# Patient Record
Sex: FEMALE | Race: WHITE | NOT HISPANIC OR LATINO | Employment: OTHER | URBAN - METROPOLITAN AREA
[De-identification: names, ages, dates, MRNs, and addresses within clinical notes are randomized per-mention and may not be internally consistent; named-entity substitution may affect disease eponyms.]

---

## 2017-12-18 ENCOUNTER — HISTORICAL (OUTPATIENT)
Dept: ADMINISTRATIVE | Facility: HOSPITAL | Age: 57
End: 2017-12-18

## 2018-02-12 ENCOUNTER — HISTORICAL (OUTPATIENT)
Dept: ADMINISTRATIVE | Facility: HOSPITAL | Age: 58
End: 2018-02-12

## 2018-03-09 ENCOUNTER — HISTORICAL (OUTPATIENT)
Dept: ADMINISTRATIVE | Facility: HOSPITAL | Age: 58
End: 2018-03-09

## 2018-03-26 ENCOUNTER — HISTORICAL (OUTPATIENT)
Dept: ADMINISTRATIVE | Facility: HOSPITAL | Age: 58
End: 2018-03-26

## 2018-03-30 ENCOUNTER — HISTORICAL (OUTPATIENT)
Dept: ADMINISTRATIVE | Facility: HOSPITAL | Age: 58
End: 2018-03-30

## 2018-06-15 ENCOUNTER — HISTORICAL (OUTPATIENT)
Dept: ADMINISTRATIVE | Facility: HOSPITAL | Age: 58
End: 2018-06-15

## 2018-09-29 ENCOUNTER — HISTORICAL (OUTPATIENT)
Dept: ADMINISTRATIVE | Facility: HOSPITAL | Age: 58
End: 2018-09-29

## 2018-10-28 ENCOUNTER — HISTORICAL (OUTPATIENT)
Dept: ADMINISTRATIVE | Facility: HOSPITAL | Age: 58
End: 2018-10-28

## 2018-12-31 ENCOUNTER — HISTORICAL (OUTPATIENT)
Dept: ADMINISTRATIVE | Facility: HOSPITAL | Age: 58
End: 2018-12-31

## 2019-05-07 ENCOUNTER — HISTORICAL (OUTPATIENT)
Dept: ADMINISTRATIVE | Facility: HOSPITAL | Age: 59
End: 2019-05-07

## 2019-06-26 ENCOUNTER — HISTORICAL (OUTPATIENT)
Dept: ADMINISTRATIVE | Facility: HOSPITAL | Age: 59
End: 2019-06-26

## 2019-08-28 ENCOUNTER — HISTORICAL (OUTPATIENT)
Dept: ADMINISTRATIVE | Facility: HOSPITAL | Age: 59
End: 2019-08-28

## 2019-08-30 ENCOUNTER — HISTORICAL (OUTPATIENT)
Dept: ADMINISTRATIVE | Facility: HOSPITAL | Age: 59
End: 2019-08-30

## 2019-09-20 ENCOUNTER — HISTORICAL (OUTPATIENT)
Dept: ADMINISTRATIVE | Facility: HOSPITAL | Age: 59
End: 2019-09-20

## 2019-11-07 ENCOUNTER — HISTORICAL (OUTPATIENT)
Dept: ADMINISTRATIVE | Facility: HOSPITAL | Age: 59
End: 2019-11-07

## 2019-11-19 ENCOUNTER — HISTORICAL (OUTPATIENT)
Dept: ADMINISTRATIVE | Facility: HOSPITAL | Age: 59
End: 2019-11-19

## 2019-11-20 ENCOUNTER — HISTORICAL (OUTPATIENT)
Dept: ADMINISTRATIVE | Facility: HOSPITAL | Age: 59
End: 2019-11-20

## 2019-11-21 ENCOUNTER — HISTORICAL (OUTPATIENT)
Dept: ADMINISTRATIVE | Facility: HOSPITAL | Age: 59
End: 2019-11-21

## 2019-12-24 ENCOUNTER — HISTORICAL (OUTPATIENT)
Dept: ADMINISTRATIVE | Facility: HOSPITAL | Age: 59
End: 2019-12-24

## 2020-03-12 ENCOUNTER — HISTORICAL (OUTPATIENT)
Dept: ADMINISTRATIVE | Facility: HOSPITAL | Age: 60
End: 2020-03-12

## 2020-06-23 ENCOUNTER — HISTORICAL (OUTPATIENT)
Dept: ADMINISTRATIVE | Facility: HOSPITAL | Age: 60
End: 2020-06-23

## 2020-09-18 ENCOUNTER — HISTORICAL (OUTPATIENT)
Dept: ADMINISTRATIVE | Facility: HOSPITAL | Age: 60
End: 2020-09-18

## 2020-09-27 ENCOUNTER — HISTORICAL (OUTPATIENT)
Dept: ADMINISTRATIVE | Facility: HOSPITAL | Age: 60
End: 2020-09-27

## 2021-01-14 ENCOUNTER — HISTORICAL (OUTPATIENT)
Dept: ADMINISTRATIVE | Facility: HOSPITAL | Age: 61
End: 2021-01-14

## 2021-02-04 LAB
BILIRUB SERPL-MCNC: NEGATIVE MG/DL
BLOOD URINE, POC: NEGATIVE
CLARITY, POC UA: CLEAR
COLOR, POC UA: YELLOW
GLUCOSE UR QL STRIP: NEGATIVE
HUMAN PAPILLOMAVIRUS (HPV): NORMAL
KETONES UR QL STRIP: NEGATIVE
LEUKOCYTE EST, POC UA: NEGATIVE
NITRITE, POC UA: NEGATIVE
PAP RECOMMENDATION EXT: NORMAL
PAP SMEAR: NORMAL
PH, POC UA: 6.5
PROTEIN, POC: NEGATIVE
SPECIFIC GRAVITY, POC UA: 1.03
UROBILINOGEN, POC UA: NORMAL

## 2021-02-24 ENCOUNTER — HISTORICAL (OUTPATIENT)
Dept: ADMINISTRATIVE | Facility: HOSPITAL | Age: 61
End: 2021-02-24

## 2021-06-16 ENCOUNTER — HISTORICAL (OUTPATIENT)
Dept: ADMINISTRATIVE | Facility: HOSPITAL | Age: 61
End: 2021-06-16

## 2021-06-25 LAB
ALBUMIN SERPL-MCNC: 4.2 G/DL (ref 3.4–5)
ALBUMIN/GLOB SERPL: 1.3 {RATIO}
ALP SERPL-CCNC: 132 U/L (ref 50–144)
ALT SERPL-CCNC: 19 U/L (ref 1–45)
ANION GAP SERPL CALC-SCNC: 7 MMOL/L (ref 7–16)
AST SERPL-CCNC: 26 U/L (ref 14–36)
BASOPHILS # BLD AUTO: 0.03 X10(3)/MCL (ref 0.01–0.08)
BASOPHILS NFR BLD AUTO: 0.4 % (ref 0.1–1.2)
BILIRUB SERPL-MCNC: 0.43 MG/DL (ref 0.1–1)
BUN SERPL-MCNC: 11 MG/DL (ref 7–20)
CALCIUM SERPL-MCNC: 9.9 MG/DL (ref 8.4–10.2)
CHLORIDE SERPL-SCNC: 110 MMOL/L (ref 94–110)
CHOLEST SERPL-MCNC: 204 MG/DL (ref 0–200)
CO2 SERPL-SCNC: 23 MMOL/L (ref 21–32)
CREAT SERPL-MCNC: 0.5 MG/DL (ref 0.52–1.04)
CREAT/UREA NIT SERPL: 22 (ref 12–20)
EOSINOPHIL # BLD AUTO: 0.05 X10(3)/MCL (ref 0.04–0.36)
EOSINOPHIL NFR BLD AUTO: 0.7 % (ref 0.7–7)
ERYTHROCYTE [DISTWIDTH] IN BLOOD BY AUTOMATED COUNT: 19.1 % (ref 11–14.5)
FERRITIN SERPL-MCNC: 7.4 NG/ML (ref 6.2–137)
GLOBULIN SER-MCNC: 3.2 G/DL (ref 2–3.9)
GLUCOSE SERPL-MCNC: 100 MGM./DL (ref 70–115)
HCT VFR BLD AUTO: 35.7 % (ref 36–48)
HDLC SERPL-MCNC: 75 MG/DL (ref 40–60)
HGB BLD-MCNC: 11.4 G/DL (ref 11.8–16)
IMM GRANULOCYTES # BLD AUTO: 0.01 X10E3/UL (ref 0–0.03)
IMM GRANULOCYTES NFR BLD AUTO: 0.1 % (ref 0–0.5)
IRON SATN MFR SERPL: 10.6 %
IRON SERPL-MCNC: 47 MCG/DL (ref 37–170)
IRON SERPL-MCNC: 47 MCG/DL (ref 37–170)
LDLC SERPL CALC-MCNC: 105.7 MG/DL (ref 30–100)
LYMPHOCYTES # BLD AUTO: 2.7 X10(3)/MCL (ref 1.16–3.74)
LYMPHOCYTES NFR BLD AUTO: 36 % (ref 20–55)
MCH RBC QN AUTO: 26 PG (ref 27–34)
MCHC RBC AUTO-ENTMCNC: 31.9 G/DL (ref 31–37)
MCV RBC AUTO: 81.3 FL (ref 79–99)
MONOCYTES # BLD AUTO: 0.54 X10(3)/MCL (ref 0.24–0.36)
MONOCYTES NFR BLD AUTO: 7.2 % (ref 4.7–12.5)
NEUTROPHILS # BLD AUTO: 4.17 X10(3)/MCL (ref 1.56–6.13)
NEUTROPHILS NFR BLD AUTO: 55.6 % (ref 37–73)
PLATELET # BLD AUTO: 271 X10(3)/MCL (ref 140–371)
PMV BLD AUTO: 11 FL (ref 9.4–12.4)
POTASSIUM SERPL-SCNC: 4.5 MMOL/L (ref 3.5–5.1)
PROT SERPL-MCNC: 7.4 G/DL (ref 6.3–8.2)
RBC # BLD AUTO: 4.39 X10(6)/MCL (ref 4–5.1)
SODIUM SERPL-SCNC: 140 MMOL/L (ref 135–145)
TIBC SERPL-MCNC: 444 MCG/DL (ref 250–450)
TRANSFERRIN SERPL-MCNC: 360 MG/DL (ref 188–341)
TRIGL SERPL-MCNC: 98 MG/DL (ref 30–200)
TSH SERPL-ACNC: 2.04 UIU/ML (ref 0.36–3.74)
WBC # SPEC AUTO: 7.5 X10(3)/MCL (ref 4–11.5)

## 2021-08-23 LAB — NONINV COLON CA DNA+OCC BLD SCRN STL QL: NEGATIVE

## 2021-09-30 ENCOUNTER — HISTORICAL (OUTPATIENT)
Dept: ADMINISTRATIVE | Facility: HOSPITAL | Age: 61
End: 2021-09-30

## 2021-10-01 LAB — BCS RECOMMENDATION EXT: NORMAL

## 2021-10-09 ENCOUNTER — HISTORICAL (OUTPATIENT)
Dept: ADMINISTRATIVE | Facility: HOSPITAL | Age: 61
End: 2021-10-09

## 2022-04-11 ENCOUNTER — HISTORICAL (OUTPATIENT)
Dept: ADMINISTRATIVE | Facility: HOSPITAL | Age: 62
End: 2022-04-11
Payer: MEDICARE

## 2022-04-25 VITALS
OXYGEN SATURATION: 96 % | BODY MASS INDEX: 51.91 KG/M2 | SYSTOLIC BLOOD PRESSURE: 120 MMHG | DIASTOLIC BLOOD PRESSURE: 86 MMHG | WEIGHT: 293 LBS | HEIGHT: 63 IN

## 2022-05-05 NOTE — HISTORICAL [facility] CERNER
This is a historical note converted from Arslan. Formatting and pictures may have been removed.  Please reference Arslan for original formatting and attached multimedia. Chief Complaint  Bilat knee pain. And Ankle pain  History of Present Illness  59?yo?female?non-smoker?presents to ortho clinic for?routine follow up?for?bilateral?knee pain.? Patient points to ?medial knee. L>R  Today:?Reevaluation of conservative treatments for?bilateral?end-stage knee OA, BMI?52%, CSI given?3/25/2019?patient reports with?some relief only lasting about?1 to 2 weeks, patient?requesting another injection regardless?as it is the only relief she is getting at this time.  Onset: ?Insidious over years?? ?progressively worsening  Current pain level: 10/10??medication helping. Quality described as?throbbing?.? Patient?acknowledges?use of pain medication today.?  Medications r/t complaint: Patient reports using?RX / OTC?medications in past including:?naproxen, Tramadol, acetaminophen-oxycodone RX per PCP. ?Currently taking?naproxen, Tramadol?PRN?pain with?moderate?relief.?  Modifying Factors: ?Worse with/after activity;Improved with rest;??Stiffness after immobilization??Stiffness improved with less than 30 minutes of activity  Injury:?Denies  Previous treatment: HEP?RX per ortho 12/2017 ?; RX NSAIDs, PT?RX per ortho 2017 but unable to attend due to to PT telling her she would require hospitalization to accomplish PT plan and she reports she can not do in patient due to children/ requiring her at home to their care; CSI x1 with some relief only lasting 1-2 months; VS 12/2017 with no relief does not want to repeat? due to inadequate relief.  Associated symptoms: Grinding; ?No numbness or tingling;??Swelling noted to knees unrelated to activity;?No skin changes;?Weakness of bilateral knees with falls;?Moderate decrease in ROM;?difficulty sleeping at night s/t pain  Activity:?Sedentary, full ADLs;?Pain interferes with function/daily  activity (mild)?Patient?acknowledges?use of assistive devices,?walker, ?for assistance with ambulation.  PMH:? denies CVD; denies DM ; denies?RA; denies?gout;??RX anticoagulants- Eliquis ; denies intolerance to NSAIDs s/t GI issues; denies ?intolerance to NSAIDs s/t kidney disease; DX PVD  Family History:?Family history of arthritis  PCP:?Dr. Shaikh SANYA [facility]ano [facility]?[place], [place]?, referral source same  Employment:? Patient worked in [employer], currently unemployed and seeking disability.  Note:? none  Review of Systems  Constitutional:No fever;No chills;No weight loss  CV:No swelling;No edema  GI:No urinary retention;No urinary incontinence  :No fecal incontinence  Skin:No rash;No wound  Neuro:No numbness/tingling;No weakness;No saddle anesthesia  MSK: As above  Psych:No depression;No anxiety  Heme/Lymph:No easy bruising;No easy bleeding;No lymphadenopathy  Immuno:No MRSA history  Physical Exam  Vitals & Measurements  HT:?162.56?cm? WT:?138.34?kg? BMI:?52.35?  MSK: BilateralKNEE  General: No apparent distress;?morbid obesity  Inspection:?limping;??partial weight bearing- patient in clinic with walker;??varus deformity;??no swelling;?no erythema;?No contusion;?atrophy / deconditioning noted moderate quad  Palpation:?tenderness noted at medical and lateral joint lines and patellar tendon; ?Crepitus:?Positive;?Normal temperature  ROM:?  ?????Active Extension/Flexion (0-140):? bilateral  Strength:?  ?????Flexion??4/5  ?????Extension??4/5  Special Tests:  ?????Ballotable Effusion: ?Positive  ?????Fluid Wave:?Negative  ?????Anterior Drawer:Negative?  ?????Lachman:?Negative?  Neurovascular:?Intact; 2+?distal pulse, sensation intact to light touch  Neuro/Psych: Awake, Alert, Oriented; Normal mood and affect  Lymphatic: No LAD  Skin and Soft Tissue: No bruising, No ecchymosis; No rash; Skin intact  Assessment/Plan  1.?Osteoarthritis of knees, bilateral?M17.0  ?1.?  Discussed with patient diagnosis and treatment recommendations.? Handout given.  2.? Imaging:?Radiological studies ordered,?reviewed,?and independently interpreted by me; discussed with patient.  3.? Treatment plan: Conservative treatment to include oral glucosamine 1500 mg/day; Topical capsaicin as needed; Hot or cold therapy; Adequate vitamin C/D intake  4.? Procedure:?Discussed CSI vs VS injections as treatment options; since conservative measures did not improve symptoms patient consented for CSI today  5.? Activity:?Activity as tolerated; HEP to included aerobic conditioning with non-painful activity and ROM/STG exercises;?recommend exercise bike with RPM set at 80 or higher build to 40 minutes 3xs per week as tolerated; ?proper footwear; assistive device to avoid limping?  6.? Therapy:?none  7.? Medication:?First line treatment with daily ?acetaminophen 1000 mg three times daily; Medication precautions given; continue medications as RX per PCP; RX topical diclofenac PRN symptom relief; medication precautions given  8.? RTC:?4 months  9.? Additional work-up:??None  Ordered:  Lidocaine inj., 5 mL, form: Injection, Intra-Articular, Once, first dose 11/07/19 10:27:00 CST, stop date 11/07/19 10:27:00 CST  Lidocaine inj., 5 mL, form: Injection, Intra-Articular, Once, first dose 11/07/19 10:28:00 CST, stop date 11/07/19 10:28:00 CST  triamcinolone, 40 mg, form: Injection, Intra-Articular, Once, first dose 11/07/19 10:28:00 CST, stop date 11/07/19 10:28:00 CST  triamcinolone, 40 mg, form: Injection, Intra-Articular, Once, first dose 11/07/19 10:27:00 CST, stop date 11/07/19 10:27:00 CST  Clinic Follow up, *Est. 03/07/20 3:00:00 CST, Order for future visit, Osteoarthritis of knees, bilateral, [facility]  Office/Outpatient Visit Level 4 Established [id number] PC, Osteoarthritis of knees, bilateral, [facility] 25, 11/07/19 10:27:00 CST  ?  2.?Morbid obesity?E66.01  ?Patient educated that diet modifications with low  impact exercises as tolerated for reduction in BMI would improve overall treatment and outcome.  ?  Orders:  Asp/Inj (Bilateral) Major Jnt/Bursa [id number], 11/07/19 10:27:00 CST, [facility], Routine, 11/07/19 10:27:00 CST  XR Knee Left 4 or More Views, Routine, 11/07/19 9:58:00 CST, Pain, None, Ambulatory, Rad Type, Knee pain, bilateral, Not Scheduled, 11/07/19 9:58:00 CST  XR Knee Right 4 or More Views, Routine, 11/07/19 9:59:00 CST, Pain, None, Ambulatory, Rad Type, Knee pain, bilateral, Not Scheduled, 11/07/19 9:59:00 CST  Referrals  Clinic Follow up, *Est. 03/09/20 10:35:00 CDT, Order for future visit, Osteoarthritis of knees, bilateral, [facility]   Problem List/Past Medical History  Ongoing  Morbid obesity  Osteoarthritis of left knee  Primary osteoarthritis of both knees  Historical  No qualifying data  Procedure/Surgical History  abdominal/intestinal sx (2017)  abdominal/intestinal sx (2016)  abdominal/intestinal sx (2008)  hernia repair (2007)  intestinal sx (2007)  colon/hemorrhoid sx (1990)  BTL (1981)  T&A (1971)   Medications  acetaminophen-oxycodone 325 mg-5 mg oral tablet, Oral, q6hr,? ?Not Taking, Completed Rx  ALPRAZOLAM TAB 1MG, Oral  Augmentin 875 mg-125 mg oral tablet, 1 tab(s), Oral, q12hr,? ?Not taking  buPROPion 300 mg/24 hours (XL) oral tablet, extended release, 300 mg= 1 tab(s), Oral, qAM  carvedilol 6.25 mg oral tablet, 6.25 mg= 1 tab(s), Oral, BID  Cyclobenzaprine 10 Mg Tablet, 10 mg= 1 tab(s), Oral, q8hr  diclofenac 1% topical gel, 2 gm, TOP, QID, PRN, 5 refills  Eliquis Starter Pack for Treatment of DVT and PE 5 mg oral tablet, 5 mg= 1 tab(s), Oral, BID  enalapril 10 mg oral tablet, 10 mg= 1 tab(s), Oral, Daily  enalapril 20 mg oral tablet, 20 mg= 1 tab(s), Oral, Daily  ferrous sulfate 325 mg oral tablet, 325 mg= 1 tab(s), Oral, Daily,? ?Not taking  furosemide 20 mg oral tablet, 20 mg= 1 tab(s), Oral, Daily  loratadine 10 mg oral tablet, 10 mg= 1 tab(s), Oral, Daily,? ?Not  taking  Naproxen 500 Mg Tablet, 500 mg= 1 tab(s), Oral, BID  ondansetron 4 mg oral tablet, Oral, q6hr  Pantoprazole 40 mg ORAL EC-Tablet, 40 mg= 1 tab(s), Oral, Daily,? ?Not Taking, Completed Rx  Poly Iron Forte oral capsule, Oral, Daily  potassium chloride 10 mEq oral CAPSULE extended release, 10 mEq= 1 cap(s), Oral, Daily,? ?Not Taking, Completed Rx  Seroquel 25 mg oral tablet, 25 mg= 1 tab(s), Oral, At Bedtime,? ?Not taking  Tramadol Hcl 50 Mg Tablet, 50 mg, Oral, q6hr,? ?Not Taking, Completed Rx  Allergies  Keflex  erythromycin  Social History  Alcohol  Past, 09/22/2016  Substance Use  Never, 09/22/2016  Tobacco  Never (less than 100 in lifetime), N/A, 11/07/2019  Health Maintenance  Health Maintenance  ???Pending?(in the next year)  ??? ??OverDue  ??? ? ? ?Diabetes Screening due??and every?  ??? ? ? ?Alcohol Misuse Screening due??01/01/19??and every 1??year(s)  ??? ??Due?  ??? ? ? ?Aspirin Therapy for CVD Prevention due??11/07/19??and every 1??year(s)  ??? ? ? ?Breast Cancer Screening due??11/07/19??and every?  ??? ? ? ?Cervical Cancer Screening due??11/07/19??and every?  ??? ? ? ?Colorectal Screening due??11/07/19??and every 1??year(s)  ??? ? ? ?Influenza Vaccine due??11/07/19??and every?  ??? ? ? ?Tetanus Vaccine due??11/07/19??and every 10??year(s)  ??? ? ? ?Zoster Vaccine due??11/07/19??and every 100??year(s)  ??? ??Due In Future?  ??? ? ? ?Obesity Screening not due until??01/01/20??and every 1??year(s)  ??? ? ? ?Hypertension Management-BMP not due until??01/14/20??and every 1??year(s)  ??? ? ? ?ADL Screening not due until??03/25/20??and every 1??year(s)  ??? ? ? ?Blood Pressure Screening not due until??05/06/20??and every 1??year(s)  ??? ? ? ?Hypertension Management-Blood Pressure not due until??05/06/20??and every 1??year(s)  ??? ? ? ?Body Mass Index Check not due until??11/06/20??and every 1??year(s)  ???Satisfied?(in the past 1 year)  ??? ??Satisfied?  ??? ? ? ?ADL Screening on??03/25/19.??Satisfied by  Natalio Benton  ??? ? ? ?Body Mass Index Check on??11/07/19.??Satisfied by Jossy Carson LPN  ??? ? ? ?Obesity Screening on??11/07/19.??Satisfied by Jossy Carson LPN  ?  Diagnostic Results  XR bilateral knees obtained 11/7/19; DJD Noted; awaiting radiologist findings.

## 2022-05-05 NOTE — HISTORICAL [facility] CERNER
This is a historical note converted from Arslan. Formatting and pictures may have been removed.  Please reference Arslan for original formatting and attached multimedia. Procedure Name  Date/Time:11/7/2019 10:56:22  Procedure:??Bilateral?Knee Injection  Indications:??Diagnostic and Therapeutic Indication - decrease pain, increase range of motion and improve quality of life  RISKS: Possible complications with injection include?bleeding, infection (0.01%), tendon rupture, steroid flare, fat pad or soft tissue atrophy, skin depigmentation, and vasovagal response. ?(Steroid flair treatment is rest, ice, NSAIDs and resolves in 24-36 hours.)  Consent:???Procedure, risks, benefits, & alternatives explained to patient, who voiced understanding and agreed to proceed with procedure. ?Consent signed and?scanned into the medical record. No absolute contraindications (cellulitis overlying joint, infection, lack of informed consent, allergy to injection mediation, kalyani protein or egg allergy for sodium hyaluronate, or history of steroid flare) or relative contraindications (brittle or out of control DM HgA1c > 10, coagulopathy INR > 3.5, previous joint replacement, or history of AVN).  Description:?Time out performed. The patient was prepped?using Chlorhexidine scrub after the appropriate?identification of anatomic landmarks.? Sterile needle used (size # 21 gauge, length 1.5 inch)?Topical anesthetic of ethyl chloride was used.? ?5 mL of 1% lidocaine plain with 40 mg of Kenalog injected.  Complications:?None?  EBL:??None  Post Procedure:?Patient reports improvement in pain and ROM. Patient alert, moving all extremities. ?Good peripheral pulses, no signs of vascular compromise, range of motion intact. ?Patient tolerated procedure well. ?Aftercare instructions were given to patient at time of discharge.??Relative rest for 3 days - avoiding excessive activity. ?Pendulum stretching exercises followed by stretching exercises  daily?starting on day 4.? Place ice on area for 15 minutes every 4 to 6 hours.??Tylenol 1000mg twice a day or ibuprofen 600 mg three times per day for next 3-4 days if not on medication already. ?Protect the area for the next 1-8 hours if anesthetic was used. ?Avoid excessive activity for next 3 to 4 weeks.?ER precautions for fever, severe joint pain, or allergic reaction or other new symptoms related to joint injection.

## 2022-05-05 NOTE — HISTORICAL [facility] CERNER
This is a historical note converted from Arslan. Formatting and pictures may have been removed.  Please reference Arslan for original formatting and attached multimedia. Chief Complaint  F/U bilat. knees  History of Present Illness  62 yo?female?non-smoker?presents to ortho clinic for?routine follow up?for?bilateral?knee pain.? Patient points to ?medial knee. L>R  Today:?Reevaluation of conservative treatments for?bilateral?end-stage knee OA, BMI?52, CSI given?9/4/2020?patient reports with?some relief only lasting about?1 to 2 weeks.?? Tearful in clinic scared of ending up in a wheelchair  Onset: ?Insidious over years?? ?progressively worsening  Current pain level: 10/10??medication helping. Quality described as?throbbing?.? Patient?acknowledges?use of pain medication today.?  Medications r/t complaint: Patient reports using?RX / OTC?medications in past including:?naproxen, Tramadol, acetaminophen-oxycodone, topical diclofenac?RX per PCP. ?Currently taking?naproxen, Tramadol, topical diclofenac ?PRN?pain with?moderate?relief.?Patient reports using topical diclofenac at night and helps her sleep better.  Modifying Factors: ?Worse with/after activity;Improved with rest;??Stiffness after immobilization??Stiffness improved with less than 30 minutes of activity  Injury:?Denies  Previous treatment: HEP?RX per ortho 12/2017 ?; RX NSAIDs, PT?RX per ortho 2017 but unable to attend due to to PT telling her she would require hospitalization to accomplish PT plan and she reports she can not do in patient due to children/ requiring her at home to their care; CSI?last injection 9/4/20?with some relief only lasting 1-2 months; VS 12/2017 with no relief does not want to repeat? due to inadequate relief.  Associated symptoms: Grinding; ?No numbness or tingling;??Swelling noted to knees unrelated to activity;?No skin changes;?Weakness of bilateral knees with falls;?Moderate decrease in ROM;?difficulty sleeping at night s/t  pain  Activity:?Sedentary, full ADLs;?Pain interferes with function/daily activity (mild)?Patient?acknowledges?use of assistive devices,?walker, ?for assistance with ambulation.  PMH:? denies CVD; denies DM ; denies?RA; denies?gout;??RX anticoagulants- Eliquis ; denies intolerance to NSAIDs s/t GI issues; denies ?intolerance to NSAIDs s/t kidney disease; DX PVD  Family History:?Family history of arthritis  PCP:?Dr. Shaikh SANYA Galeano [facility]?[place], [place]?, referral source same  Employment:? Patient worked in [employer] and [employer] g[employer], currently unemployed and seeking disability.  Note:? seen in [facility] ortho since 9/22/16  Review of Systems  Constitutional:No fever;No chills;No weight loss  CV:No swelling;No edema  GI:No urinary retention;No urinary incontinence  :No fecal incontinence  Skin:No rash;No wound  Neuro:No numbness/tingling;No weakness;No saddle anesthesia  MSK: As above  Psych:No depression;No anxiety  Heme/Lymph:No easy bruising;No easy bleeding;No lymphadenopathy  Immuno:No MRSA history  Physical Exam  Vitals & Measurements  HT:?162.56?cm? WT:?139.300?kg? BMI:?52.71?  MSK: ?Bilateral??KNEE  General: No apparent distress and?tearful in clinic today;?morbid obesity  Inspection:?limping;??full weight bearing;??no swelling;?no erythema;?No contusion;?atrophy / deconditioning noted- mild quad;?varus deformity?;??No prominence of the tibial tubercle  Palpation:?tenderness noted at lateral and medial joint lines;?No tenderness at lateral or medial retinaculum?Crepitus:?Positive;?Normal temperature  ROM:?  ?????Active Extension/Flexion (0-140):?6-101 (L); 5-103 (R)  Strength:?  ?????Flexion??4/5  ?????Extension??4/5  Special Tests:  ?????a) Effusion Testing:  ??????????Ballotable Effusion: ?Negative  ??????????Fluid Wave:?Negative  ?????b) Patellar Testing:  ??????????Patellar Grind: ?Positive? bilateral  ??????????Apprehension:?Negative  ??????????Patella?Facet?Tenderness:?Positive?  bilateral  ?????c) Meniscal Tear Assessment:?  ??????????Erick:?Positive? bilateral ?  ?????d) Ligamentous Testing:  ?????????ACL Anterior Drawer:?Negative  ?????????PCL Posterior Drawer:?Negative  ??? ?????LCL Varus Test:?Negative  ?????????MCL Valgus Test:?Negative?????  Neurovascular:?Intact;? sensation intact to light touch  Neuro/Psych: Awake, Alert, Oriented; Normal mood and affect  Lymphatic: No LAD  Skin and Soft Tissue: No bruising, No ecchymosis; No rash; Skin intact  Assessment/Plan  1.?Osteoarthritis of knees, bilateral?M17.0  ?1.? Discussed with patient diagnosis and treatment recommendations.? Handout given.  2.? Imaging:?Radiological studies ordered,?reviewed,?and independently interpreted by me; discussed with patient. Noted? DJD  3.? Treatment plan: Conservative treatment to include oral glucosamine 1500 mg/day; Topical capsaicin as needed; Hot or cold therapy; Adequate vitamin C/D intake  4.? Procedure:?Discussed CSI vs VS injections as treatment options; since conservative measures did not improve symptoms patient consented for CSI today  5.? Activity:?Activity as tolerated; HEP to included aerobic conditioning with non-painful activity and ROM/STG exercises;?recommend exercise bike with RPM set at 80 or higher build to 40 minutes 3xs per week as tolerated; ?proper footwear; assistive device to avoid limping?  6.? Therapy:?Formal PT/OT ordered  7.? Medication:?First line treatment with daily ?acetaminophen 1000 mg three times daily; Medication precautions given; continue medications as RX per PCP; RX refill for topical diclofenac PRN symptom relief, medication precaution given.  8.? RTC:?3 months?  9.? Note:? Extended discussion about importance of reducing BMI in order to qualify for surgical treatment options.? Patient will attempt to reduce BMI until next appt. in hopes of referral ASAP.  ?Physical therapy has been prescribed to you.? We do not offer physical therapy in this clinic/  Hospitals in [place].??If you have insurance you will need to contact your insurance company so they can provide you with a list of physical therapist that accept your insurance.   Ordered:  asp/inj jnt/bursa, major [id number] PC, 02/24/21 11:49:00 CST, LT, [facility], Routine, 02/24/21 11:49:00 CST, Osteoarthritis of knees, bilateral  asp/inj jnt/bursa, major [id number] PC, 02/24/21 11:49:00 CST, RT, [facility], Routine, 02/24/21 11:49:00 CST, Osteoarthritis of knees, bilateral  Clinic Follow up, *Est. 05/24/21 11:30:00 CDT, Order for future visit, Osteoarthritis of knees, bilateral, [facility]  Office/Outpatient Visit Level 4 Established [id number] PC, Osteoarthritis of knees, bilateral, [facility] 25, 02/24/21 11:49:00 CST  ?  2.?Morbid obesity?E66.01  ?Patient educated that diet modifications with low impact exercises as tolerated for reduction in BMI would improve overall treatment and outcome.??  ?  Referrals  Clinic Follow up, *Est. 05/24/21 11:30:00 CDT, Order for future visit, Osteoarthritis of knees, bilateral, [facility]   Problem List/Past Medical History  Ongoing  Morbid obesity  Osteoarthritis of left knee  Primary osteoarthritis of both knees  Historical  Atrial fibrillation  Hypertension  Pregnant  Pregnant  Pregnant  Procedure/Surgical History  PAP (08/29/2019)  abdominal/intestinal sx (2017)  abdominal/intestinal sx (2016)  abdominal/intestinal sx (2008)  hernia repair (2007)  intestinal sx (2007)  colon/hemorrhoid sx (1990)  BTL (1981)  T&A (1971)   Medications  carvedilol 6.25 mg oral tablet, 6.25 mg= 1 tab(s), Oral, BID  diclofenac 1% topical gel, 2 gm, TOP, QID, PRN, 2 refills  diclofenac 1% topical gel, 2 gm, TOP, QID, PRN, 5 refills,? ?Not taking  Eliquis Starter Pack for Treatment of DVT and PE 5 mg oral tablet, 5 mg= 1 tab(s), Oral, BID  enalapril 20 mg oral tablet, 20 mg= 1 tab(s), Oral, Daily  furosemide 20 mg oral tablet, 20 mg= 1 tab(s), Oral, Daily  Allergies  Keflex  erythromycin  Social  History  Abuse/Neglect  No, No, Yes, 02/24/2021  Alcohol  Past, 09/22/2016  Substance Use  Never, 09/22/2016  Tobacco  Never (less than 100 in lifetime), N/A, 02/24/2021  Family History  Primary malignant neoplasm of breast: Negative: Mother, Father, Sister and Brother.  Primary malignant neoplasm of colon: Negative: Mother, Father, Sister and Brother.  Primary malignant neoplasm of female genital organ: Negative: Mother, Father, Sister and Brother.  Health Maintenance  Health Maintenance  ???Pending?(in the next year)  ??? ??OverDue  ??? ? ? ?ADL Screening due??03/25/20??and every 1??year(s)  ??? ??Due?  ??? ? ? ?Alcohol Misuse Screening due??01/02/21??and every 1??year(s)  ??? ? ? ?Aspirin Therapy for CVD Prevention due??02/24/21??and every 1??year(s)  ??? ? ? ?Pneumococcal Vaccine due??02/24/21??Unknown Frequency  ??? ? ? ?Tetanus Vaccine due??02/24/21??and every 10??year(s)  ??? ? ? ?Zoster Vaccine due??02/24/21??Unknown Frequency  ??? ??Due In Future?  ??? ? ? ?Influenza Vaccine not due until??10/01/21??and every 1??day(s)  ??? ? ? ?Obesity Screening not due until??01/01/22??and every 1??year(s)  ??? ? ? ?Hypertension Management-BMP not due until??01/14/22??and every 1??year(s)  ??? ? ? ?Blood Pressure Screening not due until??02/04/22??and every 1??year(s)  ??? ? ? ?Hypertension Management-Blood Pressure not due until??02/04/22??and every 1??year(s)  ???Satisfied?(in the past 1 year)  ??? ??Satisfied?  ??? ? ? ?Blood Pressure Screening on??02/04/21.??Satisfied by Cindy Wagner  ??? ? ? ?Body Mass Index Check on??02/24/21.??Satisfied by Jossy Carson LPN  ??? ? ? ?Cervical Cancer Screening on??02/04/21.??Satisfied by Cindy Wagner  ??? ? ? ?Depression Screening on??02/24/21.??Satisfied by Jossy Carson LPN  ??? ? ? ?Hypertension Management-Blood Pressure on??02/04/21.??Satisfied by Cindy Wagner  ??? ? ? ?Influenza Vaccine on??02/04/21.??Satisfied by Cindy Wagner  ??? ? ? ?Obesity Screening  on??02/24/21.??Satisfied by Alli DELGADILLO, Jossy Hernadez  ?  Diagnostic Results  02/24/2021 10:41 CST XR Knee Right 4 or More Views  Radiology Report  XR Knee Right 4 or More Views  HISTORY: Pain  COMPARISON: None.  FINDINGS:  No acute displaced fractures or dislocations.  There is narrowing of the medial compartment of the knee joint with  the presence of marginal osteophytes there is narrowing also of the  patellofemoral joint articular spaces are otherwise preserved with  smooth articular surfaces.  No blastic or lytic lesions.  Soft tissues within normal limits.  IMPRESSION:  Degenerative changes.  02/24/2021 10:41 CST XR Knee Left 4 or More Views  Radiology Report  XR Knee Left 4 or More Views  HISTORY: Pain  COMPARISON: None.  FINDINGS:  There is narrowing of the medial compartment of the knee joint with  almost complete obliteration of the joint space articular spaces are  otherwise preserved with smooth articular surfaces No blastic or lytic  lesions.  Soft tissues within normal limits.  IMPRESSION:  Degenerative changes     [1]?XR Knee Right 4 or More Views; René Julien MD 02/24/2021 10:41 CST

## 2022-05-05 NOTE — HISTORICAL [facility]
This is a historical note converted from [employer]. Formatting and pictures may have been removed.  Please reference [employer]slan for original formatting and attached multimedia. Procedure Name  Date/Time:2/24/2021 12:49:37  Procedure:??Bilateral?Knee Injection? lateral infrapatellar approach?  Indications:??Diagnostic and Therapeutic Indication - decrease pain, increase range of motion and improve quality of life  RISKS: Possible complications with injection include?bleeding, infection (0.01%), tendon rupture, steroid flare, fat pad or soft tissue atrophy, skin depigmentation, and vasovagal response. ?(Steroid flair treatment is rest, ice, NSAIDs and resolves in 24-36 hours.)  Consent:???Procedure, risks, benefits, & alternatives explained to patient, who voiced understanding and agreed to proceed with procedure. ?Consent signed and?scanned into the medical record. No absolute contraindications (cellulitis overlying joint, infection, lack of informed consent, allergy to injection mediation, kalyani protein or egg allergy for sodium hyaluronate, or history of steroid flare) or relative contraindications (brittle or out of control DM HgA1c > 10, coagulopathy INR > 3.5, previous joint replacement, or history of AVN).  Description:?Time out performed. The patient was prepped?using Chlorhexidine scrub after the appropriate?identification of anatomic landmarks.? Sterile needle used (size # 21 gauge, length 1.5 inch)?Topical anesthetic of ethyl chloride was used.? ?5 mL of 1% lidocaine plain with 40 mg of Kenalog injected.  Complications:?None?  EBL:??None  Post Procedure:?Patient reports improvement in pain and ROM. Patient alert, moving all extremities. ?Good peripheral pulses, no signs of vascular compromise, range of motion intact. ?Patient tolerated procedure well. ?Aftercare instructions were given to patient at time of discharge.??Relative rest for 3 days - avoiding excessive activity. ?Pendulum stretching exercises followed  by stretching exercises daily?starting on day 4.? Place ice on area for 15 minutes every 4 to 6 hours.??Tylenol 1000mg twice a day or ibuprofen 600 mg three times per day for next 3-4 days if not on medication already. ?Protect the area for the next 1-8 hours if anesthetic was used. ?Avoid excessive activity for next 3 to 4 weeks.?ER precautions for fever, severe joint pain, or allergic reaction or other new symptoms related to joint injection.

## 2022-05-07 ENCOUNTER — HISTORICAL (OUTPATIENT)
Dept: ADMINISTRATIVE | Facility: HOSPITAL | Age: 62
End: 2022-05-07
Payer: MEDICAID

## 2022-10-03 ENCOUNTER — HISTORICAL (OUTPATIENT)
Dept: ADMINISTRATIVE | Facility: HOSPITAL | Age: 62
End: 2022-10-03

## 2022-12-05 ENCOUNTER — HOSPITAL ENCOUNTER (EMERGENCY)
Facility: HOSPITAL | Age: 62
Discharge: HOME | End: 2022-12-05
Attending: EMERGENCY MEDICINE
Payer: MEDICAID

## 2022-12-05 VITALS
HEIGHT: 65 IN | RESPIRATION RATE: 18 BRPM | BODY MASS INDEX: 48.82 KG/M2 | DIASTOLIC BLOOD PRESSURE: 57 MMHG | SYSTOLIC BLOOD PRESSURE: 107 MMHG | TEMPERATURE: 98 F | OXYGEN SATURATION: 100 % | WEIGHT: 293 LBS | HEART RATE: 80 BPM

## 2022-12-05 DIAGNOSIS — S43.52XA SPRAIN OF LEFT ACROMIOCLAVICULAR LIGAMENT, INITIAL ENCOUNTER: Primary | ICD-10-CM
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
## 2022-12-05 DIAGNOSIS — S62.606A CLOSED DISPLACED FRACTURE OF PHALANX OF RIGHT LITTLE FINGER, UNSPECIFIED PHALANX, INITIAL ENCOUNTER: ICD-10-CM

## 2022-12-05 PROCEDURE — 96372 THER/PROPH/DIAG INJ SC/IM: CPT | Mod: 59 | Performed by: PHYSICIAN ASSISTANT

## 2022-12-05 PROCEDURE — 26770 TREAT FINGER DISLOCATION: CPT | Mod: F4

## 2022-12-05 PROCEDURE — 25000003 PHARM REV CODE 250: Performed by: PHYSICIAN ASSISTANT

## 2022-12-05 PROCEDURE — 96375 TX/PRO/DX INJ NEW DRUG ADDON: CPT

## 2022-12-05 PROCEDURE — 96376 TX/PRO/DX INJ SAME DRUG ADON: CPT

## 2022-12-05 PROCEDURE — 99285 EMERGENCY DEPT VISIT HI MDM: CPT | Mod: 25

## 2022-12-05 PROCEDURE — 90715 TDAP VACCINE 7 YRS/> IM: CPT | Performed by: PHYSICIAN ASSISTANT

## 2022-12-05 PROCEDURE — 26775 TREAT FINGER DISLOCATION: CPT | Mod: F4

## 2022-12-05 PROCEDURE — 90471 IMMUNIZATION ADMIN: CPT | Performed by: PHYSICIAN ASSISTANT

## 2022-12-05 PROCEDURE — 63600175 PHARM REV CODE 636 W HCPCS: Performed by: PHYSICIAN ASSISTANT

## 2022-12-05 PROCEDURE — 96374 THER/PROPH/DIAG INJ IV PUSH: CPT

## 2022-12-05 RX ORDER — LIDOCAINE HYDROCHLORIDE 20 MG/ML
10 INJECTION, SOLUTION INFILTRATION; PERINEURAL ONCE
Status: COMPLETED | OUTPATIENT
Start: 2022-12-05 | End: 2022-12-05

## 2022-12-05 RX ORDER — METHOCARBAMOL 750 MG/1
1500 TABLET, FILM COATED ORAL 3 TIMES DAILY
Qty: 42 TABLET | Refills: 0 | Status: SHIPPED | OUTPATIENT
Start: 2022-12-05 | End: 2022-12-12

## 2022-12-05 RX ORDER — OXYCODONE AND ACETAMINOPHEN 5; 325 MG/1; MG/1
1 TABLET ORAL EVERY 6 HOURS PRN
Qty: 20 TABLET | Refills: 0 | Status: SHIPPED | OUTPATIENT
Start: 2022-12-05 | End: 2022-12-10

## 2022-12-05 RX ORDER — HYDROMORPHONE HYDROCHLORIDE 2 MG/ML
1 INJECTION, SOLUTION INTRAMUSCULAR; INTRAVENOUS; SUBCUTANEOUS
Status: COMPLETED | OUTPATIENT
Start: 2022-12-05 | End: 2022-12-05

## 2022-12-05 RX ORDER — ORPHENADRINE CITRATE 30 MG/ML
60 INJECTION INTRAMUSCULAR; INTRAVENOUS
Status: COMPLETED | OUTPATIENT
Start: 2022-12-05 | End: 2022-12-05

## 2022-12-05 RX ORDER — KETOROLAC TROMETHAMINE 30 MG/ML
15 INJECTION, SOLUTION INTRAMUSCULAR; INTRAVENOUS
Status: COMPLETED | OUTPATIENT
Start: 2022-12-05 | End: 2022-12-05

## 2022-12-05 RX ORDER — ONDANSETRON 2 MG/ML
4 INJECTION INTRAMUSCULAR; INTRAVENOUS
Status: COMPLETED | OUTPATIENT
Start: 2022-12-05 | End: 2022-12-05

## 2022-12-05 RX ADMIN — HYDROMORPHONE HYDROCHLORIDE 1 MG: 2 INJECTION, SOLUTION INTRAMUSCULAR; INTRAVENOUS; SUBCUTANEOUS at 03:12

## 2022-12-05 RX ADMIN — ORPHENADRINE CITRATE 60 MG: 30 INJECTION INTRAMUSCULAR; INTRAVENOUS at 02:12

## 2022-12-05 RX ADMIN — TETANUS TOXOID, REDUCED DIPHTHERIA TOXOID AND ACELLULAR PERTUSSIS VACCINE, ADSORBED 0.5 ML: 5; 2.5; 8; 8; 2.5 SUSPENSION INTRAMUSCULAR at 12:12

## 2022-12-05 RX ADMIN — HYDROMORPHONE HYDROCHLORIDE 1 MG: 2 INJECTION, SOLUTION INTRAMUSCULAR; INTRAVENOUS; SUBCUTANEOUS at 12:12

## 2022-12-05 RX ADMIN — ONDANSETRON 4 MG: 2 INJECTION INTRAMUSCULAR; INTRAVENOUS at 12:12

## 2022-12-05 RX ADMIN — LIDOCAINE HYDROCHLORIDE 10 ML: 20 INJECTION, SOLUTION INFILTRATION; PERINEURAL at 02:12

## 2022-12-05 RX ADMIN — KETOROLAC TROMETHAMINE 15 MG: 30 INJECTION, SOLUTION INTRAMUSCULAR at 02:12

## 2022-12-05 NOTE — FIRST PROVIDER EVALUATION
"Medical screening examination initiated.  I have conducted a focused provider triage encounter, findings are as follows:    Brief history of present illness:  63 yo female presents to ED for evaluation of left shoulder and right hand pain. Patient reports to walking in store with cart when the cart got away causing her to fall. Patient reports unable to move her left shoulder. States hit her head with no LOC. Denies any nausea or vomiting.     Vitals:    12/05/22 1234   BP: 118/75   Pulse: 76   Resp: 20   Temp: 97.6 °F (36.4 °C)   TempSrc: Oral   SpO2: 97%   Weight: 135.2 kg (298 lb)   Height: 5' 5" (1.651 m)       Pertinent physical exam:  Awake alert and oriented lying on stretcher.  Abrasion noted to nasal bridge.  Decreased range of motion in left shoulder due to pain.  Right hand with tenderness and deformity of 5th finger. Radial pulses 2+.     Brief workup plan:  XR, tdap, pain medication.     Preliminary workup initiated; this workup will be continued and followed by the physician or advanced practice provider that is assigned to the patient when roomed.  "

## 2022-12-05 NOTE — ED PROVIDER NOTES
Encounter Date: 12/5/2022       History     Chief Complaint   Patient presents with    Fall     Brought per [facility] for c/o falling leaving the grocery store. Having left shoulder pain and right pinky pain     61 yo female presents to ED for evaluation of left shoulder and right hand pain. Patient reports to walking in store with cart when the cart got away causing her to fall. Patient reports unable to move her left shoulder. States hit her head with no LOC. Denies any nausea or vomiting.     The history is provided by the patient. No  was used.   Review of patient's allergies indicates:   Allergen Reactions    Cephalexin      Other reaction(s): unknown    Erythromycin      Other reaction(s): unknown    Metronidazole      Other reaction(s): Vomiting     No past medical history on file.  No past surgical history on file.  No family history on file.  Social History     Tobacco Use    Smoking status: Never    Smokeless tobacco: Never     Review of Systems   Constitutional:  Negative for chills, fatigue and fever.   Respiratory:  Negative for shortness of breath.    Cardiovascular:  Negative for chest pain.   Gastrointestinal:  Negative for abdominal pain, diarrhea, nausea and vomiting.   Genitourinary:  Negative for dysuria, flank pain, frequency and urgency.   Musculoskeletal:  Positive for arthralgias, joint swelling and myalgias. Negative for back pain.   Skin:  Positive for wound.   Neurological:  Positive for headaches.   All other systems reviewed and are negative.    Physical Exam     Initial Vitals [12/05/22 1234]   BP Pulse Resp Temp SpO2   118/75 76 20 97.6 °F (36.4 °C) 97 %      MAP       --         Physical Exam    Vitals reviewed.  Constitutional: She appears well-developed.   HENT:   Head: Normocephalic and atraumatic.   Right Ear: External ear normal.   Left Ear: External ear normal.   Mouth/Throat: Oropharynx is clear and moist.   Eyes: Conjunctivae are normal. Pupils are equal, round,  and reactive to light.   Neck: Neck supple.   Normal range of motion.  Cardiovascular:  Normal rate, regular rhythm and normal heart sounds.           Pulmonary/Chest: Breath sounds normal. She has no wheezes. She has no rhonchi. She has no rales.   Abdominal: Abdomen is soft. Bowel sounds are normal. There is no abdominal tenderness.   Musculoskeletal:      Left shoulder: Tenderness present. No swelling or deformity. Decreased range of motion.      Right hand: Swelling, deformity and tenderness present. Decreased range of motion.      Cervical back: Normal range of motion and neck supple.      Comments: Radial pulses 2+. All other adjacent joints otherwise normal       Neurological: She is alert and oriented to person, place, and time. She has normal strength. No cranial nerve deficit. GCS score is 15. GCS eye subscore is 4. GCS verbal subscore is 5. GCS motor subscore is 6.   Skin: Skin is warm and dry.   Abrasion noted to bridge of nasal and chin   Psychiatric: She has a normal mood and affect.       ED Course   Orthopedic Injury    Date/Time: 12/5/2022 2:35 PM  Performed by: RAMIN Whitlock  Authorized by: RAMIN Whitlock     Location procedure was performed:  [facility] EMERGENCY DEPARTMENT  Pre-operative diagnosis:  Displaced left finger fracture  Consent Done?:  Yes  Universal Protocol:     Verbal consent obtained?: Yes      Time Out: Immediately prior to the procedure a time out was called    Injury:     Injury location: left fifth finger.      Pre-procedure assessment:     Neurovascular status: Neurovascularly intact      Distal perfusion: normal      Neurological function: normal      Range of motion: reduced      Local anesthesia used?: Yes      Anesthesia:  Nerve block    Local anesthetic:  Lidocaine 2% without epinephrine    Anesthetic total (ml):  5    Patient sedated?: No        Procedure details:     Description of findings:  Nerve block performed, traction placed, finger in better aligned  Selections made in  this section will also lock the Injury type section above.:     Immobilization:  Splint    Splint type:  Static finger    Supplies used:  Aluminum splint  Post-procedure assessment:     Neurovascular status: Neurovascularly intact      Distal perfusion: normal      Neurological function: normal      Range of motion: splinted      Patient tolerance:  Patient tolerated the procedure well with no immediate complications  Labs Reviewed - No data to display       Imaging Results              CT Head Without Contrast (Final result)  Result time 12/05/22 14:58:51      Final result by Niko Casey MD (12/05/22 14:58:51)                   Impression:      1. No acute intracranial abnormality identified.      Electronically signed by: Niko Casey  Date:    12/05/2022  Time:    14:58               Narrative:    EXAMINATION:  CT HEAD WITHOUT CONTRAST    CLINICAL HISTORY:  Head trauma, moderate-severe;, .    TECHNIQUE:  PATIENT RADIATION DOSE: DLP(mGycm) 1564    As per PQRS measures, all CT scans at this facility used dose modulation, iterative reconstruction, and/or weight based dose adjustment when appropriate to reduce radiation dose to as low as reasonably achievable.    COMPARISON:  None available.    FINDINGS:  Serial axial images were obtained of the head without the administration of IV contrast. Both brain and bone parenchymal windows were obtained.  Additional coronal and sagittal reconstructions were obtained.  Ventricles, cisterns, and sulci are mildly prominent size.  There is no evidence of intracranial hemorrhage, midline shift, mass effect, or abnormal extra-axial fluid collections.  Cerebellar tonsils extend caudally to the level of foramen magnum.  There is minimal scattered mucosal thickening at the ethmoid sinuses.  Mastoid air cells are relatively clear.  External auditory canals are grossly patent.  No acute calvarial fracture is seen.                                       CT Cervical Spine Without  Contrast (Final result)  Result time 12/05/22 15:22:24      Final result by Niko Casey MD (12/05/22 15:22:24)                   Impression:      1. No acute osseous defect identified  2. Cervical spondylosis  3. Mild to moderate encroachment into the right neural foramina C3-4 as described  4. Mild osteopenia      Electronically signed by: Niko Casey  Date:    12/05/2022  Time:    15:22               Narrative:    EXAMINATION:  CT CERVICAL SPINE WITHOUT CONTRAST    CLINICAL HISTORY:  , Neck trauma, dangerous injury mechanism (Age 16-64y);    TECHNIQUE,:  PATIENT RADIATION DOSE: DLP(mGycm) 1564    As per PQRS measures, all CT scans at this facility used dose modulation, iterative reconstruction, and/or weight based dose adjustment when appropriate to reduce radiation dose to as low as reasonably achievable.    COMPARISON:  None available    FINDINGS:  Serial axial images were obtained of the cervical spine without the administration of intravenous contrast.  Additional coronal and sagittal images were performed.  Both soft tissue and bone windows were obtained. Vertebral body height and alignment grossly intact.  There is beam hardening artifact at the lower cervical spine due to patient body habitus.  No fracture or subluxation is seen.  There is no loss of integrity to the bony spinal canal.  There is mild to moderate encroachment into the right neural foramina at C3-4 secondary to posterior osteophyte formation, poorly visualized posterior bulge, and facet hypertrophic changes.  Thyroid lobes are relatively symmetric in size.  Lung apices are relatively clear.  Atherosclerosis is identified.                                       X-Ray Finger 2 or More Views Left (Final result)  Result time 12/05/22 14:55:22      Final result by Niko Casey MD (12/05/22 14:55:22)                   Impression:      1. Interim reduction of fracture at the base of the 5th proximal phalanx since the prior exam  2. Suspect  old injury at the head of the 4th metacarpal.  Clinical correlation is indicated  3. Mild osteopenia      Electronically signed by: Niko Casey  Date:    12/05/2022  Time:    14:55               Narrative:    EXAMINATION:  XR FINGER 2 OR MORE VIEWS LEFT    CLINICAL HISTORY:  ; post reduction;.    COMPARISON:  12/05/2022    FINDINGS:  AP, oblique, and lateral views reveal interim a reduction of the mildly comminuted fracture at the base of the proximal 5th phalanx which is is in improved anatomic alignment and position.  No new fracture or dislocation is seen.                                       CT Maxillofacial Without Contrast (Final result)  Result time 12/05/22 13:40:36      Final result by Niko Casey MD (12/05/22 13:40:36)                   Impression:      1. Scattered partial absence of dentition.  Clinical correlation is indicated  2. Osteopenia  3. Mild mucosal thickening at the ethmoid sinuses bilaterally and right maxillary sinus/ostiomeatal complex  4. Mucosal hypertrophy at the inferior nasal turbinates bilaterally.      Electronically signed by: Niko Casey  Date:    12/05/2022  Time:    13:40               Narrative:    EXAMINATION:  CT SCAN OF THE SINUSES/FACIAL BONES:    CLINICAL HISTORY:  , Facial trauma, blunt;    TECHNIQUE:  As per PQRS measures, all CT scans at this facility used dose modulation, iterative reconstruction, and/or weight based dose adjustment when appropriate to reduce radiation dose to as low as reasonably achievable.    PATIENT RADIATION DOSE: DLP(mGycm)    COMPARISON:  None available    FINDINGS:  Serial axial, saggital,  and coronal images were obtained of the paranasal sinuses and facial bones without the administration of intravenous contrast.  Bone and soft tissue windows were performed.There is scattered partial absence of dentition.  Temporomandibular joints are grossly intact.  Zygomatic arches are intact.  External auditory canals and middle ears are  relatively clear.  Visualized mastoid air cells are relatively clear.  The maxillary, sphenoid, and frontal sinuses are well aerated.  No air-fluid levels are identified.  There is mild mucosal thickening at the ethmoid sinuses bilaterally.  Bony structures are osteopenic.  There is very slight right lateral deviation of the nasal septum.  There is mild mucosal thickening of the inferior right maxillary sinus and at the right ostiomeatal complex.  There is mucosal hypertrophy at the inferior nasal turbinates bilaterally.  Orbital globes are symmetric in size and shape.                                       X-Ray Shoulder Trauma Left (Final result)  Result time 12/05/22 14:25:38      Final result by Niko Casey MD (12/05/22 14:25:38)                   Impression:      1. No acute osseous defect identified  2. Mild arthritic changes acromioclavicular joint  3. Osteopenia  4. MR examination would allow further evaluation if clinically indicated      Electronically signed by: Niko Casey  Date:    12/05/2022  Time:    14:25               Narrative:    EXAMINATION:  XR SHOULDER TRAUMA 3 VIEW LEFT    CLINICAL HISTORY:  Unspecified fall, initial encounter; .    COMPARISON:  None available.    FINDINGS:  Internal rotation, external rotation, and transscapular Y-views revealno definite fracture or dislocation.  Joint spaces appear grossly intact.  No aggressive osteolytic or osteoblastic lesion is seen. Mild arthritic changes are evident at the acromioclavicular joint.  Bony structures are mildly osteopenic.                                       X-Ray Hand 3 View Right (Final result)  Result time 12/05/22 14:27:11      Final result by Niko Casey MD (12/05/22 14:27:11)                   Impression:      1. Displaced mildly comminuted oblique fracture at the base of the 5th proximal phalanx extending into the metacarpophalangeal joint  2. Deformity at the head of the 4th metacarpal suggesting old injury.  Clinical  correlation is indicated  3. Mild osteopenia      Electronically signed by: Niko Casey  Date:    12/05/2022  Time:    14:27               Narrative:    EXAMINATION:  XR HAND COMPLETE 3 VIEW RIGHT    CLINICAL HISTORY:  ; fall, 5th finger dislocation/fracture;.    COMPARISON:  None available.    FINDINGS:  AP, lateral, and obliques views reveala displaced mildly comminuted fracture at the base of the 5th proximal phalanx extending into the metacarpophalangeal joint.There is deformity at the head of the 4th metacarpal suggesting old injury.  Bony structures are mildly osteopenic.                                       Medications   Tdap (BOOSTRIX) vaccine injection 0.5 mL (0.5 mLs Intramuscular Given 12/5/22 1259)   ondansetron injection 4 mg (4 mg Intravenous Given 12/5/22 1255)   HYDROmorphone (PF) injection 1 mg (1 mg Intravenous Given 12/5/22 1254)   ketorolac injection 15 mg (15 mg Intravenous Given 12/5/22 1401)   LIDOcaine HCL 20 mg/ml (2%) injection 10 mL (10 mLs Intradermal Given 12/5/22 1404)   orphenadrine injection 60 mg (60 mg Intramuscular Given 12/5/22 1401)   HYDROmorphone (PF) injection 1 mg (1 mg Intravenous Given 12/5/22 1515)     Medical Decision Making:   Differential Diagnosis:   Fall, finger fracture/dislocation, shoulder fracture dislocation, head bleed, neck pain, neck strain, neck fracture.   Clinical Tests:   Radiological Study: Ordered and Reviewed  ED Management:  61 yo female presents after fall with left shoulder and right 5th finger deformity. Displaced finger fracture reduced and splint. XR shoulder without fracture or dislocation. Discussed AC joint sprain. Placed in sling. CT head, neck and face all negative. Abrasions of face noted. Tdap updated. Will give pain relief at home. All questions answered. Patient verbalizes understanding and agrees with plan of care.           ED Course as of 12/05/22 1621   Mon Dec 05, 2022   1433 Finger reduced with better alignment after digital  block. Patient now stating she has severe cervical spinal tenderness. Complains of severe headache. Reports on blood thinners for Afib which she initially denies. Will send to CT head and neck at this time. [SL]      ED Course User Index  [SL] RAMIN Whitlock                 Clinical Impression:   Final diagnoses:  [W19.XXXA] Fall  [S43.52XA] Sprain of left acromioclavicular ligament, initial encounter (Primary)  [S62.606A] Closed displaced fracture of phalanx of right little finger, unspecified phalanx, initial encounter        ED Disposition Condition    Discharge Stable          ED Prescriptions       Medication Sig Dispense Start Date End Date Auth. Provider    oxyCODONE-acetaminophen (PERCOCET) 5-325 mg per tablet Take 1 tablet by mouth every 6 (six) hours as needed for Pain. 20 tablet 12/5/2022 12/10/2022 RAMIN Whitlock    methocarbamoL (ROBAXIN) 750 MG Tab Take 2 tablets (1,500 mg total) by mouth 3 (three) times daily. for 7 days 42 tablet 12/5/2022 12/12/2022 RAMIN Whitlock          Follow-up Information       Follow up With Specialties Details Why Contact Info    Janene Ford, FNP-C Family Medicine   [address] [id number] [phone]      Timothy Ward MD Orthopedic Surgery Call in 1 day  [address] [place] [address]  [phone]               RAMIN Whitlock  12/05/22 [id number]

## 2022-12-05 NOTE — DISCHARGE INSTRUCTIONS
Keep splint in place. Wear sling for support.     You have been prescribed Robaxin (Methocarbamol) for muscle spasms/pain and Percocet (Oxycodone) for pain. Please do not take this medication while working, drinking alcohol, swimming, or while driving/operating heavy machinery. This medication may cause drowsiness, dizziness, impair judgment, and reduce physical capabilities.You should not drive, operate heavy machinery, or make life changing decisions while taking this medication.      This medication contains Tylenol. Please do not take any additional Tylenol while you are taking this medication.     While in the Emergency Department you received medication that may cause drowsiness, dizziness, impaired judgment, and reduced physical capabilities. You should not drive, operate heavy machinery, swim, or make life  changing decisions within 24 hours of receiving this medication.

## 2023-01-12 ENCOUNTER — DOCUMENTATION ONLY (OUTPATIENT)
Dept: ADMINISTRATIVE | Facility: HOSPITAL | Age: 63
End: 2023-01-12
Payer: MEDICAID

## 2023-01-20 ENCOUNTER — HISTORICAL (OUTPATIENT)
Dept: ADMINISTRATIVE | Facility: HOSPITAL | Age: 63
End: 2023-01-20
Payer: MEDICAID

## 2023-01-31 ENCOUNTER — TELEPHONE (OUTPATIENT)
Dept: FAMILY MEDICINE | Facility: CLINIC | Age: 63
End: 2023-01-31
Payer: MEDICAID

## 2023-01-31 NOTE — LETTER
[facility]  [address], [address]  [place] [address]  Phone: [phone]  Fax: [phone] January 31, 2023    Ruchi Sam  [address] [address]      To Whom It May Concern:    Ruchi Sam is unable to participate in jury duty due to multiple chronic conditions that will prevent her from sitting for prolonged periods of time.    If you have any questions or concerns, please feel free to call my office.    Sincerely,        Janene Ford, RONIP-C

## 2023-02-08 ENCOUNTER — HOSPITAL ENCOUNTER (EMERGENCY)
Facility: HOSPITAL | Age: 63
Discharge: HOME | End: 2023-02-08
Attending: FAMILY MEDICINE
Payer: MEDICAID

## 2023-02-08 VITALS
WEIGHT: 293 LBS | DIASTOLIC BLOOD PRESSURE: 82 MMHG | HEIGHT: 64 IN | OXYGEN SATURATION: 97 % | SYSTOLIC BLOOD PRESSURE: 147 MMHG | RESPIRATION RATE: 11 BRPM | HEART RATE: 84 BPM | BODY MASS INDEX: 50.02 KG/M2 | TEMPERATURE: 98 F

## 2023-02-08 DIAGNOSIS — R07.89 CHEST WALL PAIN: Primary | ICD-10-CM

## 2023-02-08 DIAGNOSIS — R07.9 CHEST PAIN: ICD-10-CM

## 2023-02-08 LAB
ALBUMIN SERPL-MCNC: 4.1 G/DL (ref 3.4–5)
ALBUMIN/GLOB SERPL: 1.2 RATIO
ALP SERPL-CCNC: 122 UNIT/L (ref 50–144)
ALT SERPL-CCNC: 18 UNIT/L (ref 1–45)
ANION GAP SERPL CALC-SCNC: 6 MEQ/L (ref 2–13)
AST SERPL-CCNC: 35 UNIT/L (ref 14–36)
BASOPHILS # BLD AUTO: 0.02 X10(3)/MCL (ref 0.01–0.08)
BASOPHILS NFR BLD AUTO: 0.3 % (ref 0.1–1.2)
BILIRUBIN DIRECT+TOT PNL SERPL-MCNC: 0.4 MG/DL (ref 0–1)
BUN SERPL-MCNC: 18 MG/DL (ref 7–20)
CALCIUM SERPL-MCNC: 9.3 MG/DL (ref 8.4–10.2)
CHLORIDE SERPL-SCNC: 108 MMOL/L (ref 98–110)
CK MB SERPL-MCNC: 0.39 NG/ML (ref 0–3.38)
CK SERPL-CCNC: 60 U/L (ref 30–135)
CO2 SERPL-SCNC: 27 MMOL/L (ref 21–32)
CREAT SERPL-MCNC: 0.57 MG/DL (ref 0.66–1.25)
CREAT/UREA NIT SERPL: 32 (ref 12–20)
EOSINOPHIL # BLD AUTO: 0.09 X10(3)/MCL (ref 0.04–0.36)
EOSINOPHIL NFR BLD AUTO: 1.2 % (ref 0.7–7)
ERYTHROCYTE [DISTWIDTH] IN BLOOD BY AUTOMATED COUNT: 14.8 % (ref 11–14.5)
GFR SERPLBLD CREATININE-BSD FMLA CKD-EPI: >90 MLS/MIN/1.73/M2
GLOBULIN SER-MCNC: 3.3 GM/DL (ref 2–3.9)
GLUCOSE SERPL-MCNC: 115 MG/DL (ref 70–115)
HCT VFR BLD AUTO: 38 % (ref 36–48)
HGB BLD-MCNC: 12.5 GM/DL (ref 11.8–16)
IMM GRANULOCYTES # BLD AUTO: 0.02 X10(3)/MCL (ref 0–0.03)
IMM GRANULOCYTES NFR BLD AUTO: 0.3 % (ref 0–0.5)
LYMPHOCYTES # BLD AUTO: 3.33 X10(3)/MCL (ref 1.16–3.74)
LYMPHOCYTES NFR BLD AUTO: 45.1 % (ref 20–55)
MCH RBC QN AUTO: 29.3 PG (ref 27–34)
MCV RBC AUTO: 89 FL (ref 79–99)
MEAN CELL HEMOGLOBIN CONCENTRATION (OHS) G/DL: 32.9 G/DL (ref 31–37)
MONOCYTES # BLD AUTO: 0.55 X10(3)/MCL (ref 0.24–0.36)
MONOCYTES NFR BLD AUTO: 7.4 % (ref 4.7–12.5)
NEUTROPHILS # BLD AUTO: 3.38 X10(3)/MCL (ref 1.56–6.13)
NEUTROPHILS NFR BLD AUTO: 45.7 % (ref 37–73)
NRBC BLD AUTO-RTO: 0 % (ref 0–1)
PLATELET # BLD AUTO: 218 X10(3)/MCL (ref 140–371)
PMV BLD AUTO: 10.2 FL (ref 9.4–12.4)
POTASSIUM SERPL-SCNC: 3.9 MMOL/L (ref 3.5–5.1)
PROT SERPL-MCNC: 7.4 GM/DL (ref 6.3–8.2)
RBC # BLD AUTO: 4.27 X10(6)/MCL (ref 4–5.1)
SODIUM SERPL-SCNC: 141 MMOL/L (ref 135–145)
TROPONIN I SERPL-MCNC: <0.012 NG/ML (ref 0–0.03)
WBC # SPEC AUTO: 7.4 X10(3)/MCL (ref 4–11.5)

## 2023-02-08 PROCEDURE — 82550 ASSAY OF CK (CPK): CPT | Performed by: FAMILY MEDICINE

## 2023-02-08 PROCEDURE — 84484 ASSAY OF TROPONIN QUANT: CPT | Performed by: FAMILY MEDICINE

## 2023-02-08 PROCEDURE — 93010 ELECTROCARDIOGRAM REPORT: CPT | Mod: ,,, | Performed by: INTERNAL MEDICINE

## 2023-02-08 PROCEDURE — 96374 THER/PROPH/DIAG INJ IV PUSH: CPT

## 2023-02-08 PROCEDURE — 85025 COMPLETE CBC W/AUTO DIFF WBC: CPT | Performed by: FAMILY MEDICINE

## 2023-02-08 PROCEDURE — 99285 EMERGENCY DEPT VISIT HI MDM: CPT | Mod: 25

## 2023-02-08 PROCEDURE — 93005 ELECTROCARDIOGRAM TRACING: CPT

## 2023-02-08 PROCEDURE — 63600175 PHARM REV CODE 636 W HCPCS: Performed by: FAMILY MEDICINE

## 2023-02-08 PROCEDURE — 82553 CREATINE MB FRACTION: CPT | Performed by: FAMILY MEDICINE

## 2023-02-08 PROCEDURE — 93010 EKG 12-LEAD: ICD-10-PCS | Mod: ,,, | Performed by: INTERNAL MEDICINE

## 2023-02-08 PROCEDURE — 80053 COMPREHEN METABOLIC PANEL: CPT | Performed by: FAMILY MEDICINE

## 2023-02-08 PROCEDURE — 36415 COLL VENOUS BLD VENIPUNCTURE: CPT | Performed by: FAMILY MEDICINE

## 2023-02-08 RX ORDER — KETOROLAC TROMETHAMINE 30 MG/ML
30 INJECTION, SOLUTION INTRAMUSCULAR; INTRAVENOUS
Status: COMPLETED | OUTPATIENT
Start: 2023-02-08 | End: 2023-02-08

## 2023-02-08 RX ADMIN — KETOROLAC TROMETHAMINE 30 MG: 30 INJECTION, SOLUTION INTRAMUSCULAR; INTRAVENOUS at 02:02

## 2023-02-08 NOTE — ED PROVIDER NOTES
Encounter Date: 2/8/2023       History     Chief Complaint   Patient presents with    Chest Pain     C/o left-sided chest pain on and off but has been severe the past hour, pt was at physical therapy yesterday and started having CP during session so was instructed to come to ED or see cardiologist for work-up.     Patient presented to the emergency room for pains in her left upper chest wall which she is had since early December after a fall.  She was trying to do some exercises at physical therapy when the pain got bad, so she looked to go to the emergency room to get this pain checked out.      Review of patient's allergies indicates:   Allergen Reactions    Cephalexin      Other reaction(s): unknown    Erythromycin      Other reaction(s): unknown    Metronidazole      Other reaction(s): Vomiting     Past Medical History:   Diagnosis Date    Anxiety disorder, unspecified     Depression     Hypertension     Paroxysmal atrial fibrillation      Past Surgical History:   Procedure Laterality Date    BILATERAL TUBAL LIGATION Bilateral     CHOLECYSTECTOMY      HERNIA REPAIR       History reviewed. No pertinent family history.  Social History     Tobacco Use    Smoking status: Never    Smokeless tobacco: Never   Substance Use Topics    Alcohol use: Never    Drug use: Never     Review of Systems   Constitutional:  Negative for fever.   HENT:  Negative for sore throat.    Respiratory:  Negative for shortness of breath.    Cardiovascular:  Positive for chest pain.   Gastrointestinal:  Negative for nausea.   Genitourinary:  Negative for dysuria.   Musculoskeletal:  Negative for back pain.   Skin:  Negative for rash.   Neurological:  Negative for weakness.   Hematological:  Does not bruise/bleed easily.   All other systems reviewed and are negative.    Physical Exam     Initial Vitals [02/08/23 1359]   BP Pulse Resp Temp SpO2   (!) 144/87 83 18 98.3 °F (36.8 °C) 97 %      MAP       --         Physical Exam    Nursing note and  vitals reviewed.  Constitutional: She appears well-developed and well-nourished.   Neck: Neck supple.   Normal range of motion.  Cardiovascular:  Normal rate, regular rhythm and normal heart sounds.           Pulmonary/Chest: Breath sounds normal. No respiratory distress. She has no wheezes. She has no rales.   Left upper CW tender   Abdominal: Abdomen is soft. Bowel sounds are normal.   Musculoskeletal:         General: Normal range of motion.      Cervical back: Normal range of motion and neck supple.     Neurological: She is alert and oriented to person, place, and time.   Skin: Skin is warm and dry.   Psychiatric: She has a normal mood and affect.       ED Course   Procedures  Labs Reviewed   COMPREHENSIVE METABOLIC PANEL - Abnormal; Notable for the following components:       Result Value    Creatinine 0.57 (*)     BUN/Creatinine Ratio 32 (*)     All other components within normal limits   CBC WITH DIFFERENTIAL - Abnormal; Notable for the following components:    RDW 14.8 (*)     Mono # 0.55 (*)     All other components within normal limits   TROPONIN I - Normal   CK - Normal   CK-MB - Normal   CBC W/ AUTO DIFFERENTIAL    Narrative:     The following orders were created for panel order CBC Auto Differential.  Procedure                               Abnormality         Status                     ---------                               -----------         ------                     CBC with Differential[[id number]]        Abnormal            Final result                 Please view results for these tests on the individual orders.     EKG Readings: (Independently Interpreted)   Initial Reading: No STEMI. Rhythm: Normal Sinus Rhythm. Ectopy: No Ectopy. Conduction: Normal. ST Segments: Normal ST Segments. T Waves: Normal. Clinical Impression: Normal Sinus Rhythm     Imaging Results              X-Ray Chest AP Portable (Final result)  Result time 02/08/23 14:30:01      Final result by Cassie Arteaga III, MD (02/08/23  14:30:01)                   Impression:      1. The heart is mildly to moderately enlarged with minimal vascular congestion but no evidence of benitez decompensation.  These changes are exaggerated by the poor inspiratory effort.      Electronically signed by: Cassie Arteaga  Date:    02/08/2023  Time:    14:30               Narrative:    EXAMINATION:  STUDY: XR CHEST AP PORTABLE    CLINICAL HISTORY AND TECHNIQUE:  Itz Jain RT on 2/8/2023  2:27 PM    CURRENT CLINICAL HISTORY: ER PT    X 1 HOUR PTA    -LEFT SIDED CHEST PAIN    PAST MEDICAL HISTORY: HTN    TECHNIQUE: 1 VIEW CHEST PORTABLE    TECHNOLOGIST: ELVIN    COMPARISON:  09/18/2020    FINDINGS:  The heart is mildly to moderately enlarged with minimal vascular congestion which are exaggerated by the poor inspiratory effort.I see no lobar or segmental infiltrates.No significant pleural effusions are noted.No significant musculoskeletal or vascular abnormalities are appreciated.                                    X-Rays:   Independently Interpreted Readings:   Chest X-Ray: Normal heart size.  No infiltrates.  No acute abnormalities.   Medications   ketorolac injection 30 mg (30 mg Intravenous Given 2/8/23 1440)                              Clinical Impression:   Final diagnoses:  [R07.9] Chest pain  [R07.89] Chest wall pain (Primary)        ED Disposition Condition    Discharge Stable          ED Prescriptions    None       Follow-up Information       Follow up With Specialties Details Why Contact Info    SEGUNDO Guillen-C [facility]  If symptoms worsen [address]   [address]  [place] [address]  [phone]               Julio Romero MD  02/08/23 4223

## 2023-02-22 ENCOUNTER — HOSPITAL ENCOUNTER (OUTPATIENT)
Dept: RADIOLOGY | Facility: HOSPITAL | Age: 63
Discharge: HOME | End: 2023-02-22
Payer: MEDICAID

## 2023-02-22 ENCOUNTER — HOSPITAL ENCOUNTER (EMERGENCY)
Facility: HOSPITAL | Age: 63
Discharge: HOME | End: 2023-02-22
Payer: MEDICAID

## 2023-02-22 ENCOUNTER — HOSPITAL ENCOUNTER (OUTPATIENT)
Dept: RADIOLOGY | Facility: HOSPITAL | Age: 63
Discharge: HOME | End: 2023-02-22
Attending: EMERGENCY MEDICINE
Payer: MEDICAID

## 2023-02-22 VITALS
RESPIRATION RATE: 20 BRPM | SYSTOLIC BLOOD PRESSURE: 147 MMHG | WEIGHT: 293 LBS | HEART RATE: 65 BPM | DIASTOLIC BLOOD PRESSURE: 90 MMHG | OXYGEN SATURATION: 98 % | BODY MASS INDEX: 50.02 KG/M2 | TEMPERATURE: 98 F | HEIGHT: 64 IN

## 2023-02-22 DIAGNOSIS — M25.812 SHOULDER IMPINGEMENT, LEFT: ICD-10-CM

## 2023-02-22 DIAGNOSIS — M54.12 BRACHIAL NEURITIS: ICD-10-CM

## 2023-02-22 DIAGNOSIS — M75.40: ICD-10-CM

## 2023-02-22 DIAGNOSIS — T14.90XA TRAUMA: ICD-10-CM

## 2023-02-22 DIAGNOSIS — S83.91XA SPRAIN OF RIGHT KNEE, UNSPECIFIED LIGAMENT, INITIAL ENCOUNTER: Primary | ICD-10-CM

## 2023-02-22 PROCEDURE — 73221 MRI JOINT UPR EXTREM W/O DYE: CPT | Mod: TC,LT

## 2023-02-22 PROCEDURE — 99283 EMERGENCY DEPT VISIT LOW MDM: CPT | Mod: 25

## 2023-02-22 PROCEDURE — 72050 X-RAY EXAM NECK SPINE 4/5VWS: CPT | Mod: TC
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
## 2023-02-24 ENCOUNTER — HOSPITAL ENCOUNTER (EMERGENCY)
Facility: HOSPITAL | Age: 63
Discharge: HOME | End: 2023-02-24
Attending: FAMILY MEDICINE
Payer: MEDICAID

## 2023-02-24 VITALS
HEIGHT: 64 IN | WEIGHT: 293 LBS | SYSTOLIC BLOOD PRESSURE: 141 MMHG | BODY MASS INDEX: 50.02 KG/M2 | RESPIRATION RATE: 18 BRPM | OXYGEN SATURATION: 94 % | HEART RATE: 71 BPM | DIASTOLIC BLOOD PRESSURE: 96 MMHG | TEMPERATURE: 99 F

## 2023-02-24 DIAGNOSIS — K21.9 GASTROESOPHAGEAL REFLUX DISEASE, UNSPECIFIED WHETHER ESOPHAGITIS PRESENT: ICD-10-CM

## 2023-02-24 DIAGNOSIS — R11.2 NAUSEA AND VOMITING, UNSPECIFIED VOMITING TYPE: Primary | ICD-10-CM

## 2023-02-24 LAB
ALBUMIN SERPL-MCNC: 4 G/DL (ref 3.4–5)
ALBUMIN/GLOB SERPL: 1.2 RATIO
ALP SERPL-CCNC: 103 UNIT/L (ref 50–144)
ALT SERPL-CCNC: 19 UNIT/L (ref 1–45)
ANION GAP SERPL CALC-SCNC: 7 MEQ/L (ref 2–13)
AST SERPL-CCNC: 31 UNIT/L (ref 14–36)
BASOPHILS # BLD AUTO: 0.02 X10(3)/MCL (ref 0.01–0.08)
BASOPHILS NFR BLD AUTO: 0.3 % (ref 0.1–1.2)
BILIRUBIN DIRECT+TOT PNL SERPL-MCNC: 1.1 MG/DL (ref 0–1)
BUN SERPL-MCNC: 10 MG/DL (ref 7–20)
CALCIUM SERPL-MCNC: 8.9 MG/DL (ref 8.4–10.2)
CHLORIDE SERPL-SCNC: 109 MMOL/L (ref 98–110)
CO2 SERPL-SCNC: 23 MMOL/L (ref 21–32)
CREAT SERPL-MCNC: 0.58 MG/DL (ref 0.66–1.25)
CREAT/UREA NIT SERPL: 17 (ref 12–20)
EOSINOPHIL # BLD AUTO: 0.01 X10(3)/MCL (ref 0.04–0.36)
EOSINOPHIL NFR BLD AUTO: 0.1 % (ref 0.7–7)
ERYTHROCYTE [DISTWIDTH] IN BLOOD BY AUTOMATED COUNT: 14.8 % (ref 11–14.5)
GFR SERPLBLD CREATININE-BSD FMLA CKD-EPI: >90 MLS/MIN/1.73/M2
GLOBULIN SER-MCNC: 3.3 GM/DL (ref 2–3.9)
GLUCOSE SERPL-MCNC: 131 MG/DL (ref 70–115)
HCT VFR BLD AUTO: 39 % (ref 36–48)
HGB BLD-MCNC: 13.3 G/DL (ref 11.8–16)
IMM GRANULOCYTES # BLD AUTO: 0.02 X10(3)/MCL (ref 0–0.03)
IMM GRANULOCYTES NFR BLD AUTO: 0.3 % (ref 0–0.5)
LIPASE SERPL-CCNC: 94 U/L (ref 23–300)
LYMPHOCYTES # BLD AUTO: 2.57 X10(3)/MCL (ref 1.16–3.74)
LYMPHOCYTES NFR BLD AUTO: 32.7 % (ref 20–55)
MCH RBC QN AUTO: 30 PG (ref 27–34)
MCV RBC AUTO: 88 FL (ref 79–99)
MEAN CELL HEMOGLOBIN CONCENTRATION (OHS) G/DL: 34.1 G/DL (ref 31–37)
MONOCYTES # BLD AUTO: 0.62 X10(3)/MCL (ref 0.24–0.36)
MONOCYTES NFR BLD AUTO: 7.9 % (ref 4.7–12.5)
NEUTROPHILS # BLD AUTO: 4.62 X10(3)/MCL (ref 1.56–6.13)
NEUTROPHILS NFR BLD AUTO: 58.7 % (ref 37–73)
NRBC BLD AUTO-RTO: 0 % (ref 0–1)
PLATELET # BLD AUTO: 216 X10(3)/MCL (ref 140–371)
PMV BLD AUTO: 9.9 FL (ref 9.4–12.4)
POTASSIUM SERPL-SCNC: 4.1 MMOL/L (ref 3.5–5.1)
PROT SERPL-MCNC: 7.3 GM/DL (ref 6.3–8.2)
RBC # BLD AUTO: 4.43 X10(6)/MCL (ref 4–5.1)
SODIUM SERPL-SCNC: 139 MMOL/L (ref 135–145)
WBC # SPEC AUTO: 7.9 X10(3)/MCL (ref 4–11.5)

## 2023-02-24 PROCEDURE — 85025 COMPLETE CBC W/AUTO DIFF WBC: CPT

## 2023-02-24 PROCEDURE — 25000003 PHARM REV CODE 250

## 2023-02-24 PROCEDURE — 96374 THER/PROPH/DIAG INJ IV PUSH: CPT

## 2023-02-24 PROCEDURE — 99285 EMERGENCY DEPT VISIT HI MDM: CPT | Mod: 25

## 2023-02-24 PROCEDURE — 63600175 PHARM REV CODE 636 W HCPCS

## 2023-02-24 PROCEDURE — 96372 THER/PROPH/DIAG INJ SC/IM: CPT | Mod: 59

## 2023-02-24 PROCEDURE — 36415 COLL VENOUS BLD VENIPUNCTURE: CPT

## 2023-02-24 PROCEDURE — 80053 COMPREHEN METABOLIC PANEL: CPT

## 2023-02-24 PROCEDURE — 96375 TX/PRO/DX INJ NEW DRUG ADDON: CPT

## 2023-02-24 PROCEDURE — 96361 HYDRATE IV INFUSION ADD-ON: CPT

## 2023-02-24 PROCEDURE — 83690 ASSAY OF LIPASE: CPT

## 2023-02-24 RX ORDER — PROCHLORPERAZINE EDISYLATE 5 MG/ML
10 INJECTION INTRAMUSCULAR; INTRAVENOUS
Status: COMPLETED | OUTPATIENT
Start: 2023-02-24 | End: 2023-02-24

## 2023-02-24 RX ORDER — ALPRAZOLAM 1 MG/1
1 TABLET ORAL DAILY
COMMUNITY
Start: 2023-02-16

## 2023-02-24 RX ORDER — FAMOTIDINE 10 MG/ML
20 INJECTION INTRAVENOUS
Status: COMPLETED | OUTPATIENT
Start: 2023-02-24 | End: 2023-02-24

## 2023-02-24 RX ORDER — APIXABAN 5 MG/1
5 TABLET, FILM COATED ORAL 2 TIMES DAILY
COMMUNITY
Start: 2023-02-16

## 2023-02-24 RX ORDER — ROSUVASTATIN CALCIUM 5 MG/1
5 TABLET, COATED ORAL
COMMUNITY
Start: 2023-02-16

## 2023-02-24 RX ORDER — PREDNISONE 20 MG/1
1 TABLET ORAL 2 TIMES DAILY
COMMUNITY
Start: 2023-02-13 | End: 2023-04-10 | Stop reason: ALTCHOICE

## 2023-02-24 RX ORDER — OMEPRAZOLE 20 MG/1
20 CAPSULE, DELAYED RELEASE ORAL DAILY
Qty: 15 CAPSULE | Refills: 0 | Status: SHIPPED | OUTPATIENT
Start: 2023-02-24 | End: 2023-04-10

## 2023-02-24 RX ORDER — CARVEDILOL 6.25 MG/1
6.25 TABLET ORAL 2 TIMES DAILY
COMMUNITY
Start: 2023-01-19

## 2023-02-24 RX ORDER — HYDROXYZINE 50 MG/ML
100 INJECTION, SOLUTION INTRAMUSCULAR ONCE
Status: COMPLETED | OUTPATIENT
Start: 2023-02-24 | End: 2023-02-24

## 2023-02-24 RX ORDER — PROMETHAZINE HYDROCHLORIDE 25 MG/1
25 TABLET ORAL EVERY 6 HOURS PRN
Qty: 15 TABLET | Refills: 0 | Status: SHIPPED | OUTPATIENT
Start: 2023-02-24 | End: 2023-04-10

## 2023-02-24 RX ORDER — BUSPIRONE HYDROCHLORIDE 15 MG/1
1 TABLET ORAL 2 TIMES DAILY
COMMUNITY
Start: 2023-02-16 | End: 2023-11-14

## 2023-02-24 RX ORDER — ESCITALOPRAM OXALATE 20 MG/1
20 TABLET ORAL DAILY
COMMUNITY
Start: 2023-02-16

## 2023-02-24 RX ADMIN — SODIUM CHLORIDE 1000 ML: 9 INJECTION, SOLUTION INTRAVENOUS at 05:02

## 2023-02-24 RX ADMIN — PROCHLORPERAZINE EDISYLATE 10 MG: 5 INJECTION INTRAMUSCULAR; INTRAVENOUS at 05:02

## 2023-02-24 RX ADMIN — FAMOTIDINE 20 MG: 10 INJECTION INTRAVENOUS at 05:02

## 2023-02-24 RX ADMIN — HYDROXYZINE HYDROCHLORIDE 100 MG: 50 INJECTION, SOLUTION INTRAMUSCULAR at 05:02

## 2023-02-24 NOTE — ED PROVIDER NOTES
"Encounter Date: 2/24/2023       History     Chief Complaint   Patient presents with    Vomiting     Vomiting for several days after a scan was done here. Pt vomiting upon arrival ems     Patient presented to the emergency room via EMS transport for abdominal pain and vomiting all night.  She denies any fevers or chills, and no respiratory symptoms.  Multiple visits to several ERs over the past month with various minor somatic complaints.  Of note, she went to urgent Care yesterday afternoon for knee pain and received a shot of "something".  Patient states that she is had bowel obstructions in the past and that is what this feels like.    The history is provided by the patient and the EMS personnel.   Review of patient's allergies indicates:   Allergen Reactions    Cephalexin      Other reaction(s): unknown    Erythromycin      Other reaction(s): unknown    Hydrocodone     Metronidazole      Other reaction(s): Vomiting     Past Medical History:   Diagnosis Date    Anxiety disorder, unspecified     Depression     Hypertension     Paroxysmal atrial fibrillation      Past Surgical History:   Procedure Laterality Date    BILATERAL TUBAL LIGATION Bilateral     CHOLECYSTECTOMY      HERNIA REPAIR       History reviewed. No pertinent family history.  Social History     Tobacco Use    Smoking status: Never    Smokeless tobacco: Never   Substance Use Topics    Alcohol use: Never    Drug use: Never     Review of Systems   Constitutional:  Negative for fever.   HENT:  Negative for sore throat.    Respiratory:  Negative for shortness of breath.    Cardiovascular:  Negative for chest pain.   Gastrointestinal:  Positive for abdominal pain, nausea and vomiting. Negative for diarrhea.   Genitourinary:  Negative for dysuria.   Musculoskeletal:  Negative for back pain.   Skin:  Negative for rash.   Neurological:  Negative for weakness.   Hematological:  Does not bruise/bleed easily.   Psychiatric/Behavioral:  The patient is " nervous/anxious.    All other systems reviewed and are negative.    Physical Exam     Initial Vitals   BP Pulse Resp Temp SpO2   02/24/23 0451 02/24/23 0451 02/24/23 0451 02/24/23 0451 02/24/23 0543   (!) 175/122 96 18 99.6 °F (37.6 °C) 95 %      MAP       --                Physical Exam    Nursing note and vitals reviewed.  Constitutional:   Distressed, anxious person. A+Ox4.   HENT:   Head: Normocephalic and atraumatic.   Eyes: EOM are normal. Pupils are equal, round, and reactive to light.   Neck: Neck supple.   Normal range of motion.  Cardiovascular:  Normal rate, regular rhythm and normal heart sounds.           Pulmonary/Chest: Breath sounds normal.   Abdominal: Abdomen is soft. Bowel sounds are normal. There is abdominal tenderness.   Soft, mild diffusely tender, nabs.   Musculoskeletal:         General: No edema. Normal range of motion.      Cervical back: Normal range of motion and neck supple.     Neurological: She is alert and oriented to person, place, and time.   Skin: Skin is warm and dry. Capillary refill takes less than 2 seconds.   Psychiatric:   Anxious, depressed       ED Course   Procedures  Labs Reviewed   COMPREHENSIVE METABOLIC PANEL - Abnormal; Notable for the following components:       Result Value    Glucose Level 131 (*)     Creatinine 0.58 (*)     Bilirubin Total 1.1 (*)     All other components within normal limits   CBC WITH DIFFERENTIAL - Abnormal; Notable for the following components:    RDW 14.8 (*)     Eos % 0.1 (*)     Mono # 0.62 (*)     Eos # 0.01 (*)     All other components within normal limits   LIPASE - Normal   CBC W/ AUTO DIFFERENTIAL    Narrative:     The following orders were created for panel order CBC W/ AUTO DIFFERENTIAL.  Procedure                               Abnormality         Status                     ---------                               -----------         ------                     CBC with Differential[[id number]]        Abnormal            Final result                  Please view results for these tests on the individual orders.   URINALYSIS, REFLEX TO URINE CULTURE          Imaging Results              CT Abdomen Pelvis  Without Contrast (Final result)  Result time 02/24/23 05:48:38      Final result by Cassie Arteaga III, MD (02/24/23 05:48:38)                   Impression:      1. Occasional diverticula are noted throughout the sigmoid colon with no significant inflammatory changes of diverticulitis appreciated.  2. Chronic changes are present as described above.      Electronically signed by: Cassie Arteaga  Date:    02/24/2023  Time:    05:48               Narrative:    EXAMINATION:  CT ABDOMEN PELVIS WITHOUT CONTRAST    CLINICAL HISTORY AND TECHNIQUE:  Itz Jain RT on 2/24/2023  5:42 AM    PROCEDURE: CT ABD/PELVIS WO CONT    CLINICAL HX: ER PT    X 4-5 DAYS    -N/V, MID TO UPPER ABDOMINAL PAIN    PMH: HTN, VEENA, TUBAL    TECHNIQUE:    IV CONTRAST: NONE    ORAL CONTRAST: NONE    RECTAL CONTRAST: NONE    AXIAL IMAGES @ 5 MM INTERVALS WITH MULTI PLANAR RECONSTRUCTION OF IMAGES    TOTAL IMAGE NUMBER: 175    CTDIvol(mGy): HEAD:  BODY: 22.60    DLP(mGycm): HEAD:  BODY: 1188.70    -4 CT'S DONE IN THE LAST YEAR    TECH: KWE    PT TRANSPORTED W\O INCIDENT    This patient has had 4 CT and nuclear medicine scans performed within the last 12 months.    The following DOSE REDUCTION TECHNIQUES are used for all CT scans at [facility]:    1. Automated exposure control.  2. Adjustment of the mA and/or kv according to patient size.  3. Use of iterative reconstruction technique.    COMPARISON:  10/09/2021    FINDINGS:  Liver: No clinically significant abnormalities are noted.    Gallbladder/biliary system: Previous cholecystectomy.    Spleen: No clinically significant abnormalities are noted.    Adrenal glands: No clinically significant abnormalities are noted.    Pancreas: No clinically significant abnormalities are noted.    Kidneys/ureters: No clinically  significant abnormalities are noted.    Urinary bladder: No clinically significant abnormalities are noted.    The uterus and ovaries: No clinically significant abnormalities are noted.    GI tract: Occasional diverticuli or noted originating from the sigmoid colon with no significant inflammatory changes of diverticulitis appreciated.  Unopacified loops of large and small bowel as well as the gastric lumen and appendix are otherwise difficult to evaluate with no definite abnormalities noted.    Vascular structures: Mild atherosclerotic plaquing is noted throughout the abdominal aorta is primary branches.    Musculoskeletal structures: There is moderate demineralization of skeletal structures with moderate degenerative changes noted involving the lower thoracic and lumbar spine.    Miscellaneous: Postoperative changes are present compatible with previous ventral hernia repair with what appears to be disruption of the mesh within the midline of the abdomen near the umbilical region.  No significant residual or recurrent hernia is appreciated.  Postoperative fibrosis is noted within the region of the umbilicus.                                       Medications   hydrOXYzine injection 100 mg (100 mg Intramuscular Given 2/24/23 0507)   sodium chloride 0.9% bolus 1,000 mL 1,000 mL (0 mLs Intravenous Stopped 2/24/23 0606)   prochlorperazine injection Soln 10 mg (10 mg Intravenous Given 2/24/23 0507)   famotidine (PF) injection 20 mg (20 mg Intravenous Given 2/24/23 0507)     Medical Decision Making:   Initial Assessment:   Abdominal pain, vomiting  Differential Diagnosis:   Gastroenteritis, diverticulitis, SBO, GERD  Clinical Tests:   Lab Tests: Ordered and Reviewed  Radiological Study: Ordered and Reviewed  ED Management:  All tests normal. Pt reports she is feeling much better.                        Clinical Impression:   Final diagnoses:  [R11.2] Nausea and vomiting, unspecified vomiting type (Primary)  [K21.9]  Gastroesophageal reflux disease, unspecified whether esophagitis present        ED Disposition Condition    Discharge Stable          ED Prescriptions       Medication Sig Dispense Start Date End Date Auth. Provider    omeprazole (PRILOSEC) 20 MG capsule Take 1 capsule (20 mg total) by mouth once daily. 15 capsule 2/24/2023 -- Julio Romero MD    promethazine (PHENERGAN) 25 MG tablet Take 1 tablet (25 mg total) by mouth every 6 (six) hours as needed for Nausea. 15 tablet 2/24/2023 -- Julio Romero MD          Follow-up Information       Follow up With Specialties Details Why Contact Info    Janene Ford, FNP-C Family Medicine Schedule an appointment as soon as possible for a visit  If symptoms worsen, such as severe or worsening pain, high fevers, etc. [address] [facility] [address]  [phone]               Julio Romero MD  02/24/23 7489

## 2023-03-08 ENCOUNTER — OFFICE VISIT (OUTPATIENT)
Dept: OBSTETRICS AND GYNECOLOGY | Facility: CLINIC | Age: 63
End: 2023-03-08
Payer: MEDICAID

## 2023-03-08 VITALS
DIASTOLIC BLOOD PRESSURE: 86 MMHG | HEIGHT: 65 IN | TEMPERATURE: 98 F | BODY MASS INDEX: 48.82 KG/M2 | SYSTOLIC BLOOD PRESSURE: 142 MMHG | WEIGHT: 293 LBS

## 2023-03-08 DIAGNOSIS — Z12.4 CERVICAL CANCER SCREENING: ICD-10-CM

## 2023-03-08 DIAGNOSIS — Z12.31 BREAST CANCER SCREENING BY MAMMOGRAM: ICD-10-CM

## 2023-03-08 DIAGNOSIS — Z01.419 WELL WOMAN EXAM: ICD-10-CM

## 2023-03-08 PROCEDURE — 99397 PER PM REEVAL EST PAT 65+ YR: CPT | Mod: ,,, | Performed by: OBSTETRICS & GYNECOLOGY

## 2023-03-08 PROCEDURE — 3288F FALL RISK ASSESSMENT DOCD: CPT | Mod: CPTII,,, | Performed by: OBSTETRICS & GYNECOLOGY

## 2023-03-08 PROCEDURE — 1159F MED LIST DOCD IN RCRD: CPT | Mod: CPTII,,, | Performed by: OBSTETRICS & GYNECOLOGY

## 2023-03-08 PROCEDURE — 3077F SYST BP >= 140 MM HG: CPT | Mod: CPTII,,, | Performed by: OBSTETRICS & GYNECOLOGY

## 2023-03-08 PROCEDURE — 1159F PR MEDICATION LIST DOCUMENTED IN MEDICAL RECORD: ICD-10-PCS | Mod: CPTII,,, | Performed by: OBSTETRICS & GYNECOLOGY

## 2023-03-08 PROCEDURE — 3077F PR MOST RECENT SYSTOLIC BLOOD PRESSURE >= 140 MM HG: ICD-10-PCS | Mod: CPTII,,, | Performed by: OBSTETRICS & GYNECOLOGY

## 2023-03-08 PROCEDURE — 3079F PR MOST RECENT DIASTOLIC BLOOD PRESSURE 80-89 MM HG: ICD-10-PCS | Mod: CPTII,,, | Performed by: OBSTETRICS & GYNECOLOGY

## 2023-03-08 PROCEDURE — 99397 PR PREVENTIVE VISIT,EST,65 & OVER: ICD-10-PCS | Mod: ,,, | Performed by: OBSTETRICS & GYNECOLOGY

## 2023-03-08 PROCEDURE — 3288F PR FALLS RISK ASSESSMENT DOCUMENTED: ICD-10-PCS | Mod: CPTII,,, | Performed by: OBSTETRICS & GYNECOLOGY

## 2023-03-08 PROCEDURE — 1100F PR PT FALLS ASSESS DOC 2+ FALLS/FALL W/INJURY/YR: ICD-10-PCS | Mod: CPTII,,, | Performed by: OBSTETRICS & GYNECOLOGY

## 2023-03-08 PROCEDURE — 3079F DIAST BP 80-89 MM HG: CPT | Mod: CPTII,,, | Performed by: OBSTETRICS & GYNECOLOGY

## 2023-03-08 PROCEDURE — 3008F PR BODY MASS INDEX (BMI) DOCUMENTED: ICD-10-PCS | Mod: CPTII,,, | Performed by: OBSTETRICS & GYNECOLOGY

## 2023-03-08 PROCEDURE — 1100F PTFALLS ASSESS-DOCD GE2>/YR: CPT | Mod: CPTII,,, | Performed by: OBSTETRICS & GYNECOLOGY

## 2023-03-08 PROCEDURE — 3008F BODY MASS INDEX DOCD: CPT | Mod: CPTII,,, | Performed by: OBSTETRICS & GYNECOLOGY

## 2023-03-08 NOTE — PROGRESS NOTES
Chief Complaint: Annual exam    Chief Complaint   Patient presents with    Well Woman       HPI:   Ruchi Sam is a 63 y.o. year old  here for her annual exam. She is doing well. Denies any health changes.     LMP Dates from Last 1 Encounters:   No data found for LMP   She is not having symptoms of menopause. Patient denies abnormal breast symptoms. Patient denies vaginal discharge or itching.       Postmenopause  No postmenopausal bleeding  Last pap 2021 WNL  Last MMG 10/01/2021 benign  Hx of cervical polyps    Past Medical History:   Diagnosis Date    Anxiety disorder, unspecified     Depression     Hypertension     Paroxysmal atrial fibrillation     Urinary incontinence        Past Surgical History:   Procedure Laterality Date    ABDOMINAL SURGERY      BILATERAL TUBAL LIGATION Bilateral     BOWEL RESECTION      CHOLECYSTECTOMY      HERNIA REPAIR      x2    TUBAL LIGATION           Current Outpatient Medications:     ALPRAZolam (XANAX) 1 MG tablet, Take 1 tablet by mouth once daily., Disp: , Rfl:     carvediloL (COREG) 6.25 MG tablet, Take 6.25 mg by mouth 2 (two) times daily., Disp: , Rfl:     ELIQUIS 5 mg Tab, Take 5 mg by mouth 2 (two) times daily., Disp: , Rfl:     EScitalopram oxalate (LEXAPRO) 20 MG tablet, Take 20 mg by mouth once daily., Disp: , Rfl:     omeprazole (PRILOSEC) 20 MG capsule, Take 1 capsule (20 mg total) by mouth once daily., Disp: 15 capsule, Rfl: 0    promethazine (PHENERGAN) 25 MG tablet, Take 1 tablet (25 mg total) by mouth every 6 (six) hours as needed for Nausea., Disp: 15 tablet, Rfl: 0    rosuvastatin (CRESTOR) 5 MG tablet, Take 5 mg by mouth., Disp: , Rfl:     busPIRone (BUSPAR) 15 MG tablet, Take 1 tablet by mouth 2 (two) times a day., Disp: , Rfl:     predniSONE (DELTASONE) 20 MG tablet, Take 1 tablet by mouth 2 (two) times a day., Disp: , Rfl:     Review of patient's allergies indicates:   Allergen Reactions    Cephalexin      Other reaction(s): unknown     "Erythromycin      Other reaction(s): unknown    Hydrocodone     Metronidazole      Other reaction(s): Vomiting       OB History    Para Term  AB Living   4 3 3   1 3   SAB IAB Ectopic Multiple Live Births           3      # Outcome Date GA Lbr Cecil/2nd Weight Sex Delivery Anes PTL Lv   4 Term 79    M Vag-Spont   ESTEFANY   3 Term 78    F Vag-Spont   ESTEFANY   2 AB  6w0d       ND   1 Term 75    F Vag-Spont   ESTEFANY       Social History     Tobacco Use    Smoking status: Never    Smokeless tobacco: Never   Substance Use Topics    Alcohol use: Never    Drug use: Never       History reviewed. No pertinent family history.    Review of Systems:   General/Constitutional: Chills denies. Fatigue/weakness denies. Fever denies. Night sweats denies. Hot flashes denies  Gastrointestinal: Abdominal pain denies. Blood in stool denies. Constipation denies. Diarrhea denies. Heartburn denies. Nausea denies. Vomiting denies   Genitourinary: Incontinence denies. Blood in urine denies. Frequent urination denies.  Urgency denies. Painful urination denies. Nocturia denies   Gynecologic: Irregular menses denies. Heavy bleeding  denies. Painful menses denies. Vaginal discharge denies. Vaginal odor denies. Vaginal itching/Irritation denies. Vaginal lesion denies.  Pelvic pain denies. Decreased libido denies. Vulvar lesion denies. Prolapse of genital organs denies. Painful intercourse denies. Postcoital bleeding denies   Psychiatric: Mood lability denies. Depressed mood denies. Suicidal thoughts denies. Anxiety denies. Overwhelmed denies. Appetite normal. Energy level normal       Physical Exam:   BP (!) 142/86 (BP Location: Right arm)   Temp 98.2 °F (36.8 °C)   Ht 5' 4.96" (1.65 m)   Wt (!) 147.9 kg (326 lb)   BMI 54.31 kg/m²       Chaperone: present.       General appearance: healthy, well-nourished and well-developed     Psychiatric: Orientation to time, place and person. Normal mood and affect and active, alert "     Skin: Appearance: no rashes or lesions.     Neck:   Neck: supple, FROM, trachea midline. and no masses   Thyroid: no enlargement or nodules and non-tender.       Cardiovascular:   Auscultation: RRR and no murmur.   Peripheral Vascular: no varicosities, LLE edema, RLE edema, calf tenderness, and palpable cord and pedal pulses intact.     Lungs:   Respiratory effort: no intercostal retractions or accessory muscle usage.   Auscultation: no wheezing, rales/crackles, or rhonchi and clear to auscultation.     Breast:   Inspection/Palpation: no tenderness, discrete/distinct masses, skin changes, or abnormal secretions. Nipple appearance normal.     Abdomen:   Auscultation/Inspection/Palpation: no hepatomegaly, splenomegaly, masses, tenderness or CVA tenderness and soft, non-distended bowel sounds preset.    Hernia: no palpable hernias.     Female Genitalia:   Vulva: no masses, tenderness or lesions   Bladder/Urethra: no urethral discharge or mass, normal meatus, bladder non-distended.   Vagina: no tenderness, erythema, cystocele, rectocele, abnormal vaginal discharge or vesicle(s) or ulcers   Cervix: no discharge, no cervical lacerations noted or motion tenderness and grossly normal   Uterus: normal size and shape and midline, non-tender, and no uterine prolapse.   Adnexa/Parametria: no parametrial tenderness or mass, no adnexal tenderness or ovarian mass.     Lymph Nodes:   Palpation: non tender submandibular nodes, axillary nodes, or inguinal nodes.     Rectal Exam:   Rectum: normal perianal skin.       Asessment:   1. Well woman exam  Pap   MMG   Advised patient if she notices any changes to her breasts, including a lump, mass, dimpling, discharge, rash or tenderness, to should contact us immediately  Healthy diet, exercise   Multivitamin   Seat belt   Sunscreen use   Safe sex/ STI education  Annual labs: PCPJanene  C-scope: unknown    2. Breast cancer screening by mammogram  -     Mammo Digital Screening  Bilat; Future    3. BMI 50.0-59.9, adult  Weight loss with diet and exercise modifications

## 2023-03-12 LAB
AGE GDLN ACOG TESTING: NORMAL
CYTOLOGIST CVX/VAG CYTO: NORMAL
CYTOLOGY CVX/VAG DOC CYTO: NORMAL
CYTOLOGY CVX/VAG DOC THIN PREP: NORMAL
DX ICD CODE: NORMAL
HPV I/H RISK 4 DNA CVX QL PROBE+SIG AMP: NEGATIVE
LC HPV GENOTYPE REFLEX: NORMAL
Lab: NORMAL
Lab: NORMAL
OTHER STN SPEC: NORMAL
STAT OF ADQ CVX/VAG CYTO-IMP: NORMAL

## 2023-03-21 ENCOUNTER — TELEPHONE (OUTPATIENT)
Dept: OBSTETRICS AND GYNECOLOGY | Facility: CLINIC | Age: 63
End: 2023-03-21
Payer: MEDICAID

## 2023-03-21 NOTE — TELEPHONE ENCOUNTER
----- Message from Sabra Lance sent at 3/21/2023  2:33 PM CDT -----  Regarding: Results  Contact: Ruchi Coburn calling for pap results from 3/8.  Call back number [phone]

## 2023-03-22 ENCOUNTER — HOSPITAL ENCOUNTER (OUTPATIENT)
Dept: RADIOLOGY | Facility: HOSPITAL | Age: 63
Discharge: HOME | End: 2023-03-22
Attending: OBSTETRICS & GYNECOLOGY
Payer: MEDICAID

## 2023-03-22 VITALS — BODY MASS INDEX: 48.82 KG/M2 | HEIGHT: 65 IN | WEIGHT: 293 LBS

## 2023-03-22 DIAGNOSIS — Z12.31 BREAST CANCER SCREENING BY MAMMOGRAM: ICD-10-CM

## 2023-03-22 PROCEDURE — 77067 SCR MAMMO BI INCL CAD: CPT | Mod: TC

## 2023-04-10 ENCOUNTER — OFFICE VISIT (OUTPATIENT)
Dept: FAMILY MEDICINE | Facility: CLINIC | Age: 63
End: 2023-04-10
Payer: MEDICAID

## 2023-04-10 VITALS
SYSTOLIC BLOOD PRESSURE: 120 MMHG | BODY MASS INDEX: 48.82 KG/M2 | HEIGHT: 65 IN | WEIGHT: 293 LBS | TEMPERATURE: 98 F | OXYGEN SATURATION: 98 % | HEART RATE: 84 BPM | DIASTOLIC BLOOD PRESSURE: 88 MMHG

## 2023-04-10 DIAGNOSIS — I48.0 PAROXYSMAL ATRIAL FIBRILLATION: ICD-10-CM

## 2023-04-10 DIAGNOSIS — I10 HYPERTENSION, UNSPECIFIED TYPE: ICD-10-CM

## 2023-04-10 DIAGNOSIS — E66.01 MORBID OBESITY: ICD-10-CM

## 2023-04-10 DIAGNOSIS — S46.812D TRAUMATIC TEAR OF SUPRASPINATUS TENDON OF LEFT SHOULDER, SUBSEQUENT ENCOUNTER: Primary | ICD-10-CM

## 2023-04-10 PROBLEM — M17.12 OSTEOARTHRITIS OF LEFT KNEE: Status: ACTIVE | Noted: 2023-04-10

## 2023-04-10 PROBLEM — D64.9 CHRONIC ANEMIA: Status: ACTIVE | Noted: 2023-04-10

## 2023-04-10 PROBLEM — M54.50 LOW BACK PAIN: Status: ACTIVE | Noted: 2023-04-10

## 2023-04-10 PROBLEM — Z87.19 HISTORY OF SMALL BOWEL OBSTRUCTION: Status: ACTIVE | Noted: 2023-04-10

## 2023-04-10 PROBLEM — Z86.79 HISTORY OF ATRIAL FIBRILLATION: Status: RESOLVED | Noted: 2023-04-10 | Resolved: 2023-04-10

## 2023-04-10 PROBLEM — F41.9 ANXIETY DISORDER: Status: ACTIVE | Noted: 2023-04-10

## 2023-04-10 PROBLEM — I50.32 CHRONIC DIASTOLIC HEART FAILURE: Status: ACTIVE | Noted: 2023-04-10

## 2023-04-10 PROBLEM — Z79.899 HIGH RISK MEDICATION USE: Status: ACTIVE | Noted: 2023-04-10

## 2023-04-10 PROBLEM — M17.0 PRIMARY OSTEOARTHRITIS OF BOTH KNEES: Status: ACTIVE | Noted: 2023-04-10

## 2023-04-10 PROBLEM — E78.2 MIXED HYPERLIPIDEMIA: Status: ACTIVE | Noted: 2023-04-10

## 2023-04-10 PROBLEM — K57.30 DIVERTICULOSIS OF COLON: Status: ACTIVE | Noted: 2023-04-10

## 2023-04-10 PROBLEM — Z86.79 HISTORY OF ATRIAL FIBRILLATION: Status: ACTIVE | Noted: 2023-04-10

## 2023-04-10 PROBLEM — I25.10 NONOBSTRUCTIVE ATHEROSCLEROSIS OF CORONARY ARTERY: Status: ACTIVE | Noted: 2023-04-10

## 2023-04-10 PROBLEM — Z79.891 LONG TERM PRESCRIPTION OPIATE USE: Status: ACTIVE | Noted: 2023-04-10

## 2023-04-10 PROBLEM — Z12.11 ENCOUNTER FOR SCREENING FOR MALIGNANT NEOPLASM OF COLON: Status: ACTIVE | Noted: 2023-04-10

## 2023-04-10 PROCEDURE — 99214 OFFICE O/P EST MOD 30 MIN: CPT | Mod: ,,, | Performed by: NURSE PRACTITIONER

## 2023-04-10 PROCEDURE — 99214 PR OFFICE/OUTPT VISIT, EST, LEVL IV, 30-39 MIN: ICD-10-PCS | Mod: ,,, | Performed by: NURSE PRACTITIONER

## 2023-04-10 PROCEDURE — 1159F MED LIST DOCD IN RCRD: CPT | Mod: CPTII,,, | Performed by: NURSE PRACTITIONER

## 2023-04-10 PROCEDURE — 3079F PR MOST RECENT DIASTOLIC BLOOD PRESSURE 80-89 MM HG: ICD-10-PCS | Mod: CPTII,,, | Performed by: NURSE PRACTITIONER

## 2023-04-10 PROCEDURE — 3074F PR MOST RECENT SYSTOLIC BLOOD PRESSURE < 130 MM HG: ICD-10-PCS | Mod: CPTII,,, | Performed by: NURSE PRACTITIONER

## 2023-04-10 PROCEDURE — 1160F RVW MEDS BY RX/DR IN RCRD: CPT | Mod: CPTII,,, | Performed by: NURSE PRACTITIONER

## 2023-04-10 PROCEDURE — 4010F PR ACE/ARB THEARPY RXD/TAKEN: ICD-10-PCS | Mod: CPTII,,, | Performed by: NURSE PRACTITIONER

## 2023-04-10 PROCEDURE — 1100F PTFALLS ASSESS-DOCD GE2>/YR: CPT | Mod: CPTII,,, | Performed by: NURSE PRACTITIONER

## 2023-04-10 PROCEDURE — 3079F DIAST BP 80-89 MM HG: CPT | Mod: CPTII,,, | Performed by: NURSE PRACTITIONER

## 2023-04-10 PROCEDURE — 3008F PR BODY MASS INDEX (BMI) DOCUMENTED: ICD-10-PCS | Mod: CPTII,,, | Performed by: NURSE PRACTITIONER

## 2023-04-10 PROCEDURE — 1100F PR PT FALLS ASSESS DOC 2+ FALLS/FALL W/INJURY/YR: ICD-10-PCS | Mod: CPTII,,, | Performed by: NURSE PRACTITIONER

## 2023-04-10 PROCEDURE — 3074F SYST BP LT 130 MM HG: CPT | Mod: CPTII,,, | Performed by: NURSE PRACTITIONER

## 2023-04-10 PROCEDURE — 3288F PR FALLS RISK ASSESSMENT DOCUMENTED: ICD-10-PCS | Mod: CPTII,,, | Performed by: NURSE PRACTITIONER

## 2023-04-10 PROCEDURE — 4010F ACE/ARB THERAPY RXD/TAKEN: CPT | Mod: CPTII,,, | Performed by: NURSE PRACTITIONER

## 2023-04-10 PROCEDURE — 1159F PR MEDICATION LIST DOCUMENTED IN MEDICAL RECORD: ICD-10-PCS | Mod: CPTII,,, | Performed by: NURSE PRACTITIONER

## 2023-04-10 PROCEDURE — 3008F BODY MASS INDEX DOCD: CPT | Mod: CPTII,,, | Performed by: NURSE PRACTITIONER

## 2023-04-10 PROCEDURE — 1160F PR REVIEW ALL MEDS BY PRESCRIBER/CLIN PHARMACIST DOCUMENTED: ICD-10-PCS | Mod: CPTII,,, | Performed by: NURSE PRACTITIONER

## 2023-04-10 PROCEDURE — 3288F FALL RISK ASSESSMENT DOCD: CPT | Mod: CPTII,,, | Performed by: NURSE PRACTITIONER

## 2023-04-10 RX ORDER — DICLOFENAC SODIUM 10 MG/G
1 GEL TOPICAL
COMMUNITY
Start: 2023-01-16 | End: 2023-11-14

## 2023-04-10 RX ORDER — FUROSEMIDE 40 MG/1
40 TABLET ORAL 2 TIMES DAILY
COMMUNITY
Start: 2022-11-08

## 2023-04-10 RX ORDER — ENALAPRIL MALEATE 20 MG/1
1 TABLET ORAL DAILY
COMMUNITY
Start: 2023-04-05

## 2023-04-10 RX ORDER — NAPROXEN 500 MG/1
500 TABLET ORAL EVERY 12 HOURS
COMMUNITY
Start: 2022-11-08 | End: 2023-12-07

## 2023-04-10 NOTE — PROGRESS NOTES
Patient ID: Ruchi Sam  : 1960    Chief Complaint: Shoulder Pain    Allergies: Patient is allergic to cephalexin, erythromycin, hydrocodone, and metronidazole.     History of Present Illness:  The patient is a 63 y.o. White female who presents to clinic for follow up on Shoulder Pain     Suffered a fall in December and subsequently injured her left shoulder. She did PT for nearly 4 weeks but pain intensified. Has been using Voltaren gel but it does not seem to help. She has established with Ortho, Dr Overton ([facility]) in [place] and she says that he plans to do a surgical intervention. No date set yet, she has f/u appointment on the  of this month.     She is established with Dr Vásquez and will need surgical clearance prior to any intervention as she is on Eliquis for hx afib.        Social History:  reports that she has never smoked. She has never been exposed to tobacco smoke. She has never used smokeless tobacco. She reports that she does not drink alcohol and does not use drugs.    Past Medical History:  has a past medical history of Anxiety disorder, unspecified, Depression, Hypertension, Paroxysmal atrial fibrillation, and Urinary incontinence.    Current Medications:  Current Outpatient Medications   Medication Instructions    ALPRAZolam (XANAX) 1 MG tablet 1 tablet, Oral, Daily    busPIRone (BUSPAR) 15 MG tablet 1 tablet, Oral, 2 times daily    carvediloL (COREG) 6.25 mg, Oral, 2 times daily    diclofenac sodium (VOLTAREN) 1 g, Topical (Top), COVID 4 times daily    ELIQUIS 5 mg, Oral, 2 times daily    enalapril (VASOTEC) 20 MG tablet 1 tablet, Oral, Daily    EScitalopram oxalate (LEXAPRO) 20 mg, Oral, Daily    furosemide (LASIX) 40 mg, Oral, 2 times daily    naproxen (NAPROSYN) 500 mg, Oral, Every 12 hours    rosuvastatin (CRESTOR) 5 mg, Oral       Review of Systems   See HPI    Visit Vitals  /88 (BP Location: Left arm, Patient Position: Sitting)   Pulse 84   Temp 97.9 °F  "(36.6 °C) (Temporal)   Ht 5' 5" (1.651 m)   Wt (!) 147.3 kg (324 lb 11.8 oz)   SpO2 98%   BMI 54.04 kg/m²       Physical Exam  Constitutional:       Appearance: Normal appearance.   Cardiovascular:      Heart sounds: Normal heart sounds.   Pulmonary:      Breath sounds: Normal breath sounds.   Musculoskeletal:      Left shoulder: Tenderness (entire shoulder) present. Decreased range of motion (above 90 degrees). Decreased strength.   Skin:     General: Skin is warm and dry.   Psychiatric:         Mood and Affect: Mood normal.          Labs Reviewed:  Chemistry:  Lab Results   Component Value Date     02/24/2023    K 4.1 02/24/2023    CHLORIDE 109 02/24/2023    BUN 10.0 02/24/2023    CREATININE 0.58 (L) 02/24/2023    EGFRNORACEVR >90 02/24/2023    GLUCOSE 131 (H) 02/24/2023    CALCIUM 8.9 02/24/2023    ALKPHOS 103 02/24/2023    LABPROT 7.3 02/24/2023    ALBUMIN 4.0 02/24/2023    AST 31 02/24/2023    ALT 19 02/24/2023    TSH 2.04 06/25/2021          Hematology:  Lab Results   Component Value Date    WBC 7.9 02/24/2023    RBC 4.43 02/24/2023    HGB 13.3 02/24/2023    HCT 39.0 02/24/2023    MCV 88.0 02/24/2023    MCH 30.0 02/24/2023    MCHC 34.1 02/24/2023    RDW 14.8 (H) 02/24/2023     02/24/2023    MPV 9.9 02/24/2023         Assessment & Plan:    1. Traumatic tear of supraspinatus tendon of left shoulder, subsequent encounter  Follow-up with orthopedics for further instruction and surgical plan.    2. Paroxysmal atrial fibrillation  Follow-up with Cardiology for surgical clearance, will need to hold Eliquis prior to surgery.    3. Hypertension, unspecified type  Well controlled.   Continue enalapril 20 mg daily and Coreg 6.25 mg BID  Low Sodium Diet (DASH Diet - Less than 2 grams of sodium per day).  Monitor blood pressure daily and log. Report consistent numbers greater than 140/90.  Maintain healthy weight with goal BMI <30. Exercise 30 minutes per day, 5 days per week.  Smoking cessation encouraged to " aid in BP reduction.    4. Morbid obesity  Body mass index is 54.04 kg/m².  Goal BMI <30.  Exercise 5 times a week for 30 minutes per day.  Avoid soda, simple sugars, excessive rice, potatoes or bread. Limit fast foods and fried foods.  Choose complex carbs in moderation (example: green vegetables, beans, oatmeal). Eat plenty of fresh fruits and vegetables with lean meats daily.  Do not skip meals. Eat a balanced portion size.  Avoid fad diets. Consider permanent healthy life style changes.        Future Appointments   Date Time Provider Department Center   11/7/2023  8:40 AM LAB, [facility] LABORATORY DRAW [facility]ATION [facility] RON Gao   11/14/2023 10:00 AM BERENICE Guillen [facility] BOBBY Flores [facility]       Follow up if symptoms worsen or fail to improve. Call sooner if needed.    BERENICE Christine

## 2023-11-07 ENCOUNTER — LAB VISIT (OUTPATIENT)
Dept: LAB | Facility: CLINIC | Age: 63
End: 2023-11-07
Payer: MEDICARE

## 2023-11-07 DIAGNOSIS — I25.10 NONOBSTRUCTIVE ATHEROSCLEROSIS OF CORONARY ARTERY: ICD-10-CM

## 2023-11-07 DIAGNOSIS — Z79.899 HIGH RISK MEDICATION USE: ICD-10-CM

## 2023-11-07 DIAGNOSIS — I10 HYPERTENSION, UNSPECIFIED TYPE: ICD-10-CM

## 2023-11-07 DIAGNOSIS — Z00.01 ENCOUNTER FOR WELL ADULT EXAM WITH ABNORMAL FINDINGS: Primary | ICD-10-CM

## 2023-11-07 DIAGNOSIS — I48.0 PAROXYSMAL ATRIAL FIBRILLATION: ICD-10-CM

## 2023-11-07 DIAGNOSIS — D64.9 CHRONIC ANEMIA: ICD-10-CM

## 2023-11-07 DIAGNOSIS — E66.01 MORBID OBESITY: ICD-10-CM

## 2023-11-07 DIAGNOSIS — E78.2 MIXED HYPERLIPIDEMIA: ICD-10-CM

## 2023-11-07 LAB
ALBUMIN SERPL-MCNC: 4.2 G/DL (ref 3.4–5)
ALBUMIN/GLOB SERPL: 1.4 RATIO
ALP SERPL-CCNC: 100 UNIT/L (ref 50–144)
ALT SERPL-CCNC: 19 UNIT/L (ref 1–45)
ANION GAP SERPL CALC-SCNC: 8 MEQ/L (ref 2–13)
AST SERPL-CCNC: 27 UNIT/L (ref 14–36)
BASOPHILS # BLD AUTO: 0.03 X10(3)/MCL (ref 0.01–0.08)
BASOPHILS NFR BLD AUTO: 0.4 % (ref 0.1–1.2)
BILIRUB SERPL-MCNC: 0.5 MG/DL (ref 0–1)
BUN SERPL-MCNC: 18 MG/DL (ref 7–20)
CALCIUM SERPL-MCNC: 9.7 MG/DL (ref 8.4–10.2)
CHLORIDE SERPL-SCNC: 107 MMOL/L (ref 98–110)
CHOLEST SERPL-MCNC: 173 MG/DL (ref 0–200)
CO2 SERPL-SCNC: 26 MMOL/L (ref 21–32)
CREAT SERPL-MCNC: 0.54 MG/DL (ref 0.66–1.25)
CREAT UR-MCNC: 127.7 MG/DL (ref 45–106)
CREAT/UREA NIT SERPL: 33 (ref 12–20)
EOSINOPHIL # BLD AUTO: 0.09 X10(3)/MCL (ref 0.04–0.36)
EOSINOPHIL NFR BLD AUTO: 1.2 % (ref 0.7–7)
ERYTHROCYTE [DISTWIDTH] IN BLOOD BY AUTOMATED COUNT: 14 % (ref 11–14.5)
EST. AVERAGE GLUCOSE BLD GHB EST-MCNC: 111.2 MG/DL (ref 70–115)
GFR SERPLBLD CREATININE-BSD FMLA CKD-EPI: >90 MLS/MIN/1.73/M2
GLOBULIN SER-MCNC: 3 GM/DL (ref 2–3.9)
GLUCOSE SERPL-MCNC: 105 MG/DL (ref 70–115)
HBA1C MFR BLD: 5.5 % (ref 4–6)
HCT VFR BLD AUTO: 38.5 % (ref 36–48)
HDLC SERPL-MCNC: 79 MG/DL (ref 40–60)
HGB BLD-MCNC: 12.8 G/DL (ref 11.8–16)
IMM GRANULOCYTES # BLD AUTO: 0.02 X10(3)/MCL (ref 0–0.03)
IMM GRANULOCYTES NFR BLD AUTO: 0.3 % (ref 0–0.5)
LDLC SERPL DIRECT ASSAY-SCNC: 72.8 MG/DL (ref 30–100)
LYMPHOCYTES # BLD AUTO: 3.07 X10(3)/MCL (ref 1.16–3.74)
LYMPHOCYTES NFR BLD AUTO: 42.2 % (ref 20–55)
MCH RBC QN AUTO: 29.3 PG (ref 27–34)
MCHC RBC AUTO-ENTMCNC: 33.2 G/DL (ref 31–37)
MCV RBC AUTO: 88.1 FL (ref 79–99)
MICROALBUMIN UR-MCNC: 9.3 UG/ML
MICROALBUMIN/CREAT RATIO PNL UR: 7.3 MG/GM CR (ref 0–30)
MONOCYTES # BLD AUTO: 0.49 X10(3)/MCL (ref 0.24–0.36)
MONOCYTES NFR BLD AUTO: 6.7 % (ref 4.7–12.5)
NEUTROPHILS # BLD AUTO: 3.58 X10(3)/MCL (ref 1.56–6.13)
NEUTROPHILS NFR BLD AUTO: 49.2 % (ref 37–73)
NRBC BLD AUTO-RTO: 0 %
PLATELET # BLD AUTO: 219 X10(3)/MCL (ref 140–371)
PMV BLD AUTO: 10.4 FL (ref 9.4–12.4)
POTASSIUM SERPL-SCNC: 4.5 MMOL/L (ref 3.5–5.1)
PROT SERPL-MCNC: 7.2 GM/DL (ref 6.3–8.2)
RBC # BLD AUTO: 4.37 X10(6)/MCL (ref 4–5.1)
SODIUM SERPL-SCNC: 141 MMOL/L (ref 135–145)
TRIGL SERPL-MCNC: 100 MG/DL (ref 30–200)
TSH SERPL-ACNC: 1.34 UIU/ML (ref 0.36–3.74)
WBC # SPEC AUTO: 7.28 X10(3)/MCL (ref 4–11.5)

## 2023-11-07 PROCEDURE — 85025 COMPLETE CBC W/AUTO DIFF WBC: CPT | Performed by: NURSE PRACTITIONER

## 2023-11-07 PROCEDURE — 83036 HEMOGLOBIN GLYCOSYLATED A1C: CPT | Performed by: NURSE PRACTITIONER

## 2023-11-07 PROCEDURE — 84443 ASSAY THYROID STIM HORMONE: CPT | Performed by: NURSE PRACTITIONER

## 2023-11-07 PROCEDURE — 80053 COMPREHEN METABOLIC PANEL: CPT | Performed by: NURSE PRACTITIONER

## 2023-11-07 PROCEDURE — 80061 LIPID PANEL: CPT | Performed by: NURSE PRACTITIONER

## 2023-11-07 PROCEDURE — 82043 UR ALBUMIN QUANTITATIVE: CPT | Performed by: NURSE PRACTITIONER

## 2023-11-14 ENCOUNTER — OFFICE VISIT (OUTPATIENT)
Dept: FAMILY MEDICINE | Facility: CLINIC | Age: 63
End: 2023-11-14
Payer: MEDICARE

## 2023-11-14 VITALS
HEART RATE: 85 BPM | DIASTOLIC BLOOD PRESSURE: 76 MMHG | HEIGHT: 65 IN | TEMPERATURE: 98 F | OXYGEN SATURATION: 98 % | SYSTOLIC BLOOD PRESSURE: 138 MMHG | BODY MASS INDEX: 48.82 KG/M2 | WEIGHT: 293 LBS

## 2023-11-14 DIAGNOSIS — E66.01 MORBID OBESITY: ICD-10-CM

## 2023-11-14 DIAGNOSIS — Z23 NEED FOR INFLUENZA VACCINATION: ICD-10-CM

## 2023-11-14 DIAGNOSIS — Z00.00 MEDICARE ANNUAL WELLNESS VISIT, INITIAL: Primary | ICD-10-CM

## 2023-11-14 DIAGNOSIS — I10 PRIMARY HYPERTENSION: ICD-10-CM

## 2023-11-14 DIAGNOSIS — I48.0 PAROXYSMAL ATRIAL FIBRILLATION: ICD-10-CM

## 2023-11-14 DIAGNOSIS — E78.2 MIXED HYPERLIPIDEMIA: ICD-10-CM

## 2023-11-14 PROBLEM — M17.12 OSTEOARTHRITIS OF LEFT KNEE: Status: RESOLVED | Noted: 2023-04-10 | Resolved: 2023-11-14

## 2023-11-14 PROBLEM — S83.91XA SPRAIN OF RIGHT KNEE: Status: RESOLVED | Noted: 2023-02-22 | Resolved: 2023-11-14

## 2023-11-14 PROBLEM — Z79.891 LONG TERM PRESCRIPTION OPIATE USE: Status: RESOLVED | Noted: 2023-04-10 | Resolved: 2023-11-14

## 2023-11-14 PROBLEM — T14.90XA TRAUMA: Status: RESOLVED | Noted: 2023-02-22 | Resolved: 2023-11-14

## 2023-11-14 PROCEDURE — G0008 ADMIN INFLUENZA VIRUS VAC: HCPCS | Mod: ,,, | Performed by: NURSE PRACTITIONER

## 2023-11-14 PROCEDURE — 3044F HG A1C LEVEL LT 7.0%: CPT | Mod: CPTII,,, | Performed by: NURSE PRACTITIONER

## 2023-11-14 PROCEDURE — 1160F RVW MEDS BY RX/DR IN RCRD: CPT | Mod: CPTII,,, | Performed by: NURSE PRACTITIONER

## 2023-11-14 PROCEDURE — 3078F DIAST BP <80 MM HG: CPT | Mod: CPTII,,, | Performed by: NURSE PRACTITIONER

## 2023-11-14 PROCEDURE — 3066F NEPHROPATHY DOC TX: CPT | Mod: CPTII,,, | Performed by: NURSE PRACTITIONER

## 2023-11-14 PROCEDURE — 1159F MED LIST DOCD IN RCRD: CPT | Mod: CPTII,,, | Performed by: NURSE PRACTITIONER

## 2023-11-14 PROCEDURE — 3078F PR MOST RECENT DIASTOLIC BLOOD PRESSURE < 80 MM HG: ICD-10-PCS | Mod: CPTII,,, | Performed by: NURSE PRACTITIONER

## 2023-11-14 PROCEDURE — 3075F PR MOST RECENT SYSTOLIC BLOOD PRESS GE 130-139MM HG: ICD-10-PCS | Mod: CPTII,,, | Performed by: NURSE PRACTITIONER

## 2023-11-14 PROCEDURE — G0402 INITIAL PREVENTIVE EXAM: HCPCS | Mod: ,,, | Performed by: NURSE PRACTITIONER

## 2023-11-14 PROCEDURE — 1159F PR MEDICATION LIST DOCUMENTED IN MEDICAL RECORD: ICD-10-PCS | Mod: CPTII,,, | Performed by: NURSE PRACTITIONER

## 2023-11-14 PROCEDURE — G0402 PR WELCOME MEDICARE PREVENTIVE VISIT NEW ENROLLEE: ICD-10-PCS | Mod: ,,, | Performed by: NURSE PRACTITIONER

## 2023-11-14 PROCEDURE — G0008 FLU VACCINE - QUADRIVALENT - ADJUVANTED: ICD-10-PCS | Mod: ,,, | Performed by: NURSE PRACTITIONER

## 2023-11-14 PROCEDURE — 3075F SYST BP GE 130 - 139MM HG: CPT | Mod: CPTII,,, | Performed by: NURSE PRACTITIONER

## 2023-11-14 PROCEDURE — 3061F PR NEG MICROALBUMINURIA RESULT DOCUMENTED/REVIEW: ICD-10-PCS | Mod: CPTII,,, | Performed by: NURSE PRACTITIONER

## 2023-11-14 PROCEDURE — 3066F PR DOCUMENTATION OF TREATMENT FOR NEPHROPATHY: ICD-10-PCS | Mod: CPTII,,, | Performed by: NURSE PRACTITIONER

## 2023-11-14 PROCEDURE — 4010F PR ACE/ARB THEARPY RXD/TAKEN: ICD-10-PCS | Mod: CPTII,,, | Performed by: NURSE PRACTITIONER

## 2023-11-14 PROCEDURE — 3044F PR MOST RECENT HEMOGLOBIN A1C LEVEL <7.0%: ICD-10-PCS | Mod: CPTII,,, | Performed by: NURSE PRACTITIONER

## 2023-11-14 PROCEDURE — 1160F PR REVIEW ALL MEDS BY PRESCRIBER/CLIN PHARMACIST DOCUMENTED: ICD-10-PCS | Mod: CPTII,,, | Performed by: NURSE PRACTITIONER

## 2023-11-14 PROCEDURE — 4010F ACE/ARB THERAPY RXD/TAKEN: CPT | Mod: CPTII,,, | Performed by: NURSE PRACTITIONER

## 2023-11-14 PROCEDURE — 3061F NEG MICROALBUMINURIA REV: CPT | Mod: CPTII,,, | Performed by: NURSE PRACTITIONER

## 2023-11-14 PROCEDURE — 90694 VACC AIIV4 NO PRSRV 0.5ML IM: CPT | Mod: ,,, | Performed by: NURSE PRACTITIONER

## 2023-11-14 PROCEDURE — 90694 FLU VACCINE - QUADRIVALENT - ADJUVANTED: ICD-10-PCS | Mod: ,,, | Performed by: NURSE PRACTITIONER

## 2023-11-14 RX ORDER — NITROGLYCERIN 0.4 MG/1
0.4 TABLET SUBLINGUAL
COMMUNITY
Start: 2023-11-06

## 2023-11-14 NOTE — PROGRESS NOTES
Patient ID: Ruchi Sam  : 1960    Chief Complaint: Medicare AWV      History of Present Illness:  The patient is a 63 y.o. White female who presents to clinic for annual wellness visit.      Here for initial Medicare wellness. She is feeling well in general other than routine aches and pains. Her left shoulder was repaired about 6 months ago and she has regained all functions.     Has f/u with cardiology later this week; planned GXT and ECHO, then f/u with Dr Vásquez in December.           Allergies: Patient is allergic to cephalexin, erythromycin, hydrocodone, and metronidazole.     Past Medical History:  has a past medical history of Anxiety disorder, unspecified, Depression, Hypertension, Paroxysmal atrial fibrillation, and Urinary incontinence.    Surgical History:  has a past surgical history that includes Hernia repair; Cholecystectomy; Bilateral tubal ligation (Bilateral); Abdominal surgery; Tubal ligation; and Bowel resection.    Family History: family history is not on file.    Social History:  reports that she has never smoked. She has never been exposed to tobacco smoke. She has never used smokeless tobacco. She reports that she does not drink alcohol and does not use drugs.    Care Team: Patient Care Team:  Janene Ford FNP-C as PCP - General (Family Medicine)     Current Medications:  Current Outpatient Medications   Medication Instructions    ALPRAZolam (XANAX) 1 MG tablet 1 tablet, Oral, Daily    carvediloL (COREG) 6.25 mg, Oral, 2 times daily    ELIQUIS 5 mg, Oral, 2 times daily    enalapril (VASOTEC) 20 MG tablet 1 tablet, Oral, Daily    EScitalopram oxalate (LEXAPRO) 20 mg, Oral, Daily    furosemide (LASIX) 40 mg, Oral, 2 times daily    naproxen (NAPROSYN) 500 mg, Oral, Every 12 hours    nitroGLYCERIN (NITROSTAT) 0.4 mg, Sublingual, As needed (PRN)    rosuvastatin (CRESTOR) 5 mg, Oral       Opioid Screening: Patient medication list reviewed, patient is not taking prescription  opioids. Patient is not using additional opioids than prescribed. Patient is at low risk of substance abuse based on this opioid use history.     Patient Reported Health Risk Assessment  What [age] 65-69  Are you male or female?: Female  During the past four weeks, how much have you been bothered by emotional problems such as feeling anxious, depressed, irritable, sad, or downhearted and blue?: Quite a bit  During the past five weeks, has your physical and/or emotional health limited your social activities with family, friends, neighbors, or groups?: Moderately  During the past four weeks, how much bodily pain have you generally had?: Severe pain  During the past four weeks, was someone available to help if you needed and wanted help?: Yes, as much as I wanted  During the past four weeks, what was the hardest physical activity you could do for at least two minutes?: Moderate  Can you get to places out of walking distance without help?  (For example, can you travel alone on buses or taxis, or drive your own car?): No  Can you go shopping for groceries or clothes without someone's help?: No  Can you prepare your own meals?: Yes  Can you do your own housework without help?: No  Because of any health problems, do you need the help of another person with your personal care needs such as eating, bathing, dressing, or getting around the house?: Yes  Can you handle your own money without help?: Yes  During the past four weeks, how would you rate your health in general?: Fair  How have things been going for you during the past four weeks?: Pretty bad  Are you having difficulties driving your car?: Yes, often  Do you always fasten your seat belt when you are in a car?: Yes, usually  How often in the past four weeks have you been bothered by falling or dizzy when standing up?: Seldom  How often in the past four weeks have you been bothered by sexual problems?: Never  How often in the past four weeks have you been  bothered by trouble eating well?: Never  How often in the past four weeks have you been bothered by teeth or denture problems?: Never  How often in the past four weeks have you been bothered with problems using the telephone?: Never  How often in the past four weeks have you been bothered by tiredness or fatigue?: Always  Have you fallen two or more times in the past year?: Yes  Are you afraid of falling?: Yes  Are you a smoker?: No  During the past four weeks, how many drinks of wine, beer, or other alcoholic beverages did you have?: No alcohol at all  Do you exercise for about 20 minutes three or more days a week?: No, I usually do not exercise this much  Have you been given any information to help you with hazards in your house that might hurt you?: Yes  Have you been given any information to help you with keeping track of your medications?: Yes  How often do you have trouble taking medicines the way you've been told to take them?: I always take them as prescribed  How confident are you that you can control and manage most of your health problems?: Somewhat confident  What is your race? (Check all that apply.):     Review of Systems   Constitutional:  Negative for activity change, appetite change, fatigue and unexpected weight change.   HENT:  Negative for ear pain and hearing loss.    Eyes:  Negative for visual disturbance.   Respiratory:  Negative for apnea, cough, chest tightness, shortness of breath and wheezing.    Cardiovascular:  Negative for chest pain, palpitations, leg swelling and claudication.   Gastrointestinal:  Negative for abdominal pain, anal bleeding, blood in stool, change in bowel habit, nausea, vomiting and reflux.   Endocrine: Negative for cold intolerance, heat intolerance, polydipsia, polyphagia and polyuria.   Genitourinary:  Negative for dysuria, frequency, hematuria and urgency.   Musculoskeletal:  Negative for arthralgias.   Integumentary:  Negative for rash and mole/lesion.  "  Allergic/Immunologic: Negative for environmental allergies.   Neurological:  Negative for dizziness, headaches and memory loss.        Visit Vitals  /76 (BP Location: Left arm, Patient Position: Sitting, BP Method: Large (Manual))   Pulse 85   Temp 97.8 °F (36.6 °C) (Oral)   Ht 5' 5" (1.651 m)   Wt (!) 149.7 kg (330 lb)   SpO2 98%   BMI 54.91 kg/m²       Physical Exam  Vitals reviewed.   Constitutional:       Appearance: Normal appearance. She is obese.   Eyes:      Conjunctiva/sclera: Conjunctivae normal.   Cardiovascular:      Rate and Rhythm: Normal rate and regular rhythm.      Pulses: Normal pulses.      Heart sounds: Normal heart sounds.   Pulmonary:      Effort: Pulmonary effort is normal.      Breath sounds: Normal breath sounds.   Abdominal:      General: Bowel sounds are normal.      Palpations: Abdomen is soft.   Musculoskeletal:         General: Normal range of motion.      Cervical back: Neck supple.   Skin:     General: Skin is warm and dry.      Capillary Refill: Capillary refill takes less than 2 seconds.   Neurological:      Mental Status: She is alert and oriented to person, place, and time.   Psychiatric:         Mood and Affect: Mood normal.         Behavior: Behavior normal.               No data to display                  4/10/2023     1:30 PM 3/8/2023    10:30 AM   Fall Risk Assessment - Outpatient   Mobility Status Ambulatory w/ assistance Ambulatory w/ assistance   Number of falls 1 with injury 2+   Identified as fall risk True            Depression Screening  Over the past two weeks, has the patient felt down, depressed, or hopeless?: Yes  Over the past two weeks, has the patient felt little interest or pleasure in doing things?: Yes  Functional Ability/Safety Screening  Was the patient's timed Up & Go test unsteady or longer than 30 seconds?: No  Does the patient need help with phone, transportation, shopping, preparing meals, housework, laundry, meds, or managing money?: " Yes  Does the patient's home have rugs in the hallway, lack grab bars in the bathroom, lack handrails on the stairs or have poor lighting?: Yes  Have you noticed any hearing difficulties?: No  Cognitive Function (Assessed through direct observation with due consideration of information obtained by way of patient reports and/or concerns raised by family, friends, caretakers, or others)    Does the patient repeat questions/statements in the same day?: No  Does the patient have trouble remembering the date, year, and time?: No  Does the patient have difficulty managing finances?: No  Does the patient have a decreased sense of direction?: No    Labs Reviewed:  Chemistry:  Lab Results   Component Value Date     11/07/2023    K 4.5 11/07/2023    CHLORIDE 107 11/07/2023    BUN 18.0 11/07/2023    CREATININE 0.54 (L) 11/07/2023    EGFRNORACEVR >90 11/07/2023    GLUCOSE 105 11/07/2023    CALCIUM 9.7 11/07/2023    ALKPHOS 100 11/07/2023    LABPROT 7.2 11/07/2023    ALBUMIN 4.2 11/07/2023    AST 27 11/07/2023    ALT 19 11/07/2023    TSH 1.340 11/07/2023        Lab Results   Component Value Date    HGBA1C 5.5 11/07/2023        Hematology:  Lab Results   Component Value Date    WBC 7.28 11/07/2023    RBC 4.37 11/07/2023    HGB 12.8 11/07/2023    HCT 38.5 11/07/2023    MCV 88.1 11/07/2023    MCH 29.3 11/07/2023    MCHC 33.2 11/07/2023    RDW 14.0 11/07/2023     11/07/2023    MPV 10.4 11/07/2023       Lipid Panel:  Lab Results   Component Value Date    CHOL 173 11/07/2023    HDL 79 (H) 11/07/2023    DLDL 72.8 11/07/2023    TRIG 100 11/07/2023        Assessment & Plan:  1. Medicare annual wellness visit, initial  -     Cancel: POCT EKG 12-LEAD (Manually Resulted by Ordering Provider)  -     CBC Auto Differential; Future; Expected date: 11/14/2024  -     Comprehensive Metabolic Panel; Future; Expected date: 11/14/2024  -     Lipid Panel; Future; Expected date: 11/14/2024  -     TSH; Future; Expected date: 11/14/2024  -      Hemoglobin A1C; Future; Expected date: 11/14/2024    2. Primary hypertension  Overview:  On enalapril 20 mg daily, carvedilol 6.25 mg b.i.d.    Assessment & Plan:  Well controlled.   Low Sodium Diet (DASH Diet - Less than 2 grams of sodium per day).  Monitor blood pressure daily and log. Report consistent numbers greater than 140/90.  Maintain healthy weight with goal BMI <30. Exercise 30 minutes per day, 5 days per week.  Smoking cessation encouraged to aid in BP reduction.        3. Mixed hyperlipidemia  Overview:  On Rosuvastatin 5 mg daily.     Assessment & Plan:  Lab Results   Component Value Date    CHOL 173 11/07/2023    HDL 79 (H) 11/07/2023    DLDL 72.8 11/07/2023    TRIG 100 11/07/2023     LDL Goal: 70  Educated on dietary modifications. Follow a low cholesterol, low saturated fat diet with less that 200mg of cholesterol a day.  Avoid fried foods and high saturated fats.  Increase dietary fiber.  Regular exercise can reduce LDL (bad cholesterol) and raise HDL (good cholesterol). Encourage physical activity 5 times per week for 30 minutes per day.        4. Paroxysmal atrial fibrillation  Overview:  On Eliquis 5 mg BID and Coreg 6.25 BID.       5. Morbid obesity  Assessment & Plan:  Body mass index is 54.91 kg/m².  Goal BMI <30.  Exercise 5 times a week for 30 minutes per day.  Avoid soda, simple sugars, excessive rice, potatoes or bread. Limit fast foods and fried foods.  Choose complex carbs in moderation (example: green vegetables, beans, oatmeal). Eat plenty of fresh fruits and vegetables with lean meats daily.  Do not skip meals. Eat a balanced portion size.  Avoid fad diets. Consider permanent healthy life style changes.         6. Need for influenza vaccination  -     Influenza (FLUAD) - Quadrivalent (Adjuvanted) *Preferred* (65+) (PF)    Other orders  -     Cancel: Cologuard Screening (Multitarget Stool DNA); Future; Expected date: 11/14/2023         Cervical Cancer Screening- UTD  Breast Cancer  Screening- UTD  Osteoporosis Screening- deferred at patient request.  Colon Cancer Screening -  Cologuard 08/2021.  Eye Exam- Recommend annually.  Dental Exam- Recommend biannually.    Vaccinations:  Immunization History   Administered Date(s) Administered    Influenza 01/25/2010, 11/24/2019    Influenza (FLUAD) - Quadrivalent - Adjuvanted - PF *Preferred* (65+) 11/14/2023    Influenza - Quadrivalent - PF *Preferred* (6 months and older) 01/08/2021, 10/20/2021, 11/08/2022    Influenza - Trivalent - PF (ADULT) 01/25/2010, 11/24/2019    Influenza A (H1N1) 2009 Monovalent - IM - PF 01/25/2010    Tdap 12/05/2022       Follow up in about 1 year (around 11/14/2024) for wellness, labs prior. Call sooner if needed.    Janene Ford, RONIP-C         Medicare Annual Wellness and Personalized Prevention Plan:   Fall Risk + Home Safety + Hearing Impairment + Depression Screen + Opioid and Substance Abuse Screening + Cognitive Impairment Screen + Health Risk Assessment all reviewed.     Health Maintenance Topics with due status: Not Due       Topic Last Completion Date    Colorectal Cancer Screening 08/23/2021    TETANUS VACCINE 12/05/2022    Mammogram 03/22/2023    Hemoglobin A1c (Diabetic Prevention Screening) 11/07/2023    Lipid Panel 11/07/2023      The patient's Health Maintenance was reviewed and the following appears to be due at this time:   Health Maintenance Due   Topic Date Due    Hepatitis C Screening  Never done    COVID-19 Vaccine (1) Never done    HIV Screening  Never done    DEXA Scan  Never done    Shingles Vaccine (1 of 2) Never done    RSV Vaccine (Age 60+) (1 - 1-dose 60+ series) Never done    Pneumococcal Vaccines (Age 65+) (1 - PCV) Never done       Advance Care Planning   I attest to discussing Advance Care Planning with patient and/or family member.  Education was provided including the importance of the Health Care Power of , Advance Directives, and/or [place] documentation.  The patient  expressed understanding to the importance of this information and discussion.       Follow up in about 1 year (around 11/14/2024) for wellness, labs prior. In addition to their scheduled follow up, the patient has also been instructed to follow up on as needed basis.

## 2023-11-14 NOTE — ASSESSMENT & PLAN NOTE
Well controlled.   Low Sodium Diet (DASH Diet - Less than 2 grams of sodium per day).  Monitor blood pressure daily and log. Report consistent numbers greater than 140/90.  Maintain healthy weight with goal BMI <30. Exercise 30 minutes per day, 5 days per week.  Smoking cessation encouraged to aid in BP reduction.

## 2023-11-14 NOTE — PROGRESS NOTES
Patient ID: Ruchi Sam  : 1960    Chief Complaint: No chief complaint on file.    Allergies: Patient is allergic to cephalexin, erythromycin, hydrocodone, and metronidazole.     History of Present Illness:  The patient is a 63 y.o. White female who presents to clinic for annual wellness visit.    HPI   Here for annual wellness.     Past Medical History:  has a past medical history of Anxiety disorder, unspecified, Depression, Hypertension, Paroxysmal atrial fibrillation, and Urinary incontinence.    Surgical History:  has a past surgical history that includes Hernia repair; Cholecystectomy; Bilateral tubal ligation (Bilateral); Abdominal surgery; Tubal ligation; and Bowel resection.    Family History: family history is not on file.    Social History:  reports that she has never smoked. She has never been exposed to tobacco smoke. She has never used smokeless tobacco. She reports that she does not drink alcohol and does not use drugs.    Care Team: Patient Care Team:  Janene Ford FNP-C as PCP - General (Family Medicine)     Current Medications:  Current Outpatient Medications   Medication Instructions    ALPRAZolam (XANAX) 1 MG tablet 1 tablet, Oral, Daily    busPIRone (BUSPAR) 15 MG tablet 1 tablet, Oral, 2 times daily    carvediloL (COREG) 6.25 mg, Oral, 2 times daily    diclofenac sodium (VOLTAREN) 1 g, Topical (Top), COVID 4 times daily    ELIQUIS 5 mg, Oral, 2 times daily    enalapril (VASOTEC) 20 MG tablet 1 tablet, Oral, Daily    EScitalopram oxalate (LEXAPRO) 20 mg, Oral, Daily    furosemide (LASIX) 40 mg, Oral, 2 times daily    naproxen (NAPROSYN) 500 mg, Oral, Every 12 hours    rosuvastatin (CRESTOR) 5 mg, Oral       Review of Systems     There were no vitals taken for this visit.    Physical Exam     Labs Reviewed:  Chemistry:  Lab Results   Component Value Date     2023    K 4.5 2023    CHLORIDE 107 2023    BUN 18.0 2023    CREATININE 0.54 (L) 2023     EGFRNORACEVR >90 11/07/2023    GLUCOSE 105 11/07/2023    CALCIUM 9.7 11/07/2023    ALKPHOS 100 11/07/2023    LABPROT 7.2 11/07/2023    ALBUMIN 4.2 11/07/2023    AST 27 11/07/2023    ALT 19 11/07/2023    TSH 1.340 11/07/2023        Lab Results   Component Value Date    HGBA1C 5.5 11/07/2023        Hematology:  Lab Results   Component Value Date    WBC 7.28 11/07/2023    RBC 4.37 11/07/2023    HGB 12.8 11/07/2023    HCT 38.5 11/07/2023    MCV 88.1 11/07/2023    MCH 29.3 11/07/2023    MCHC 33.2 11/07/2023    RDW 14.0 11/07/2023     11/07/2023    MPV 10.4 11/07/2023       Lipid Panel:  Lab Results   Component Value Date    CHOL 173 11/07/2023    HDL 79 (H) 11/07/2023    DLDL 72.8 11/07/2023    TRIG 100 11/07/2023        Assessment & Plan:  1. Routine general medical examination at a health care facility    2. Primary hypertension  Overview:  On enalapril 20 mg daily, carvedilol 6.25 mg b.i.d.      3. Mixed hyperlipidemia  Overview:  On Rosuvastatin 5 mg daily.            Cervical Cancer Screening-  Breast Cancer Screening-  Osteoporosis Screening-  Colon Cancer Screening -    Eye Exam- Recommend annually.  Dental Exam- Recommend biannually.    Vaccinations:  Immunization History   Administered Date(s) Administered    Influenza 01/25/2010, 11/24/2019    Influenza - Quadrivalent - PF *Preferred* (6 months and older) 01/08/2021, 10/20/2021, 11/08/2022    Influenza - Trivalent - PF (ADULT) 01/25/2010, 11/24/2019    Influenza A (H1N1) 2009 Monovalent - IM - PF 01/25/2010    Tdap 12/05/2022       Future Appointments   Date Time Provider Department Center   11/14/2023 10:00 AM Janene Ford FNP-C [facility]       No follow-ups on file. Call sooner if needed.    BERENICE Christine

## 2023-11-14 NOTE — ASSESSMENT & PLAN NOTE

## 2023-11-14 NOTE — ASSESSMENT & PLAN NOTE
Lab Results   Component Value Date    CHOL 173 11/07/2023    HDL 79 (H) 11/07/2023    DLDL 72.8 11/07/2023    TRIG 100 11/07/2023     LDL Goal: 70  Educated on dietary modifications. Follow a low cholesterol, low saturated fat diet with less that 200mg of cholesterol a day.  Avoid fried foods and high saturated fats.  Increase dietary fiber.  Regular exercise can reduce LDL (bad cholesterol) and raise HDL (good cholesterol). Encourage physical activity 5 times per week for 30 minutes per day.

## 2023-12-07 ENCOUNTER — HOSPITAL ENCOUNTER (EMERGENCY)
Facility: HOSPITAL | Age: 63
Discharge: HOME | End: 2023-12-07
Payer: MEDICARE

## 2023-12-07 VITALS
RESPIRATION RATE: 20 BRPM | BODY MASS INDEX: 50.02 KG/M2 | WEIGHT: 293 LBS | HEIGHT: 64 IN | DIASTOLIC BLOOD PRESSURE: 114 MMHG | SYSTOLIC BLOOD PRESSURE: 142 MMHG | OXYGEN SATURATION: 99 % | HEART RATE: 94 BPM | TEMPERATURE: 98 F

## 2023-12-07 DIAGNOSIS — G44.319 ACUTE POST-TRAUMATIC HEADACHE, NOT INTRACTABLE: ICD-10-CM
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
## 2023-12-07 DIAGNOSIS — S29.9XXA CHEST WALL INJURY, INITIAL ENCOUNTER: ICD-10-CM
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
## 2023-12-07 PROCEDURE — 99285 EMERGENCY DEPT VISIT HI MDM: CPT | Mod: 25

## 2023-12-07 PROCEDURE — 63600175 PHARM REV CODE 636 W HCPCS

## 2023-12-07 PROCEDURE — 93005 ELECTROCARDIOGRAM TRACING: CPT

## 2023-12-07 PROCEDURE — 96372 THER/PROPH/DIAG INJ SC/IM: CPT

## 2023-12-07 PROCEDURE — 93010 EKG 12-LEAD: ICD-10-PCS | Mod: ,,, | Performed by: INTERNAL MEDICINE

## 2023-12-07 PROCEDURE — 93010 ELECTROCARDIOGRAM REPORT: CPT | Mod: ,,, | Performed by: INTERNAL MEDICINE

## 2023-12-07 RX ORDER — KETOROLAC TROMETHAMINE 30 MG/ML
30 INJECTION, SOLUTION INTRAMUSCULAR; INTRAVENOUS
Status: COMPLETED | OUTPATIENT
Start: 2023-12-07 | End: 2023-12-07

## 2023-12-07 RX ORDER — TIZANIDINE 4 MG/1
4 TABLET ORAL EVERY 6 HOURS PRN
Qty: 20 TABLET | Refills: 0 | Status: SHIPPED | OUTPATIENT
Start: 2023-12-07

## 2023-12-07 RX ORDER — LORAZEPAM 2 MG/ML
1 INJECTION INTRAMUSCULAR
Status: COMPLETED | OUTPATIENT
Start: 2023-12-07 | End: 2023-12-07

## 2023-12-07 RX ORDER — HYDROMORPHONE HYDROCHLORIDE 1 MG/ML
1 INJECTION, SOLUTION INTRAMUSCULAR; INTRAVENOUS; SUBCUTANEOUS
Status: COMPLETED | OUTPATIENT
Start: 2023-12-07 | End: 2023-12-07

## 2023-12-07 RX ORDER — NAPROXEN 500 MG/1
500 TABLET ORAL 2 TIMES DAILY
Qty: 20 TABLET | Refills: 0 | Status: SHIPPED | OUTPATIENT
Start: 2023-12-07

## 2023-12-07 RX ADMIN — LORAZEPAM 1 MG: 2 INJECTION INTRAMUSCULAR; INTRAVENOUS at 05:12

## 2023-12-07 RX ADMIN — KETOROLAC TROMETHAMINE 30 MG: 30 INJECTION, SOLUTION INTRAMUSCULAR; INTRAVENOUS at 05:12

## 2023-12-07 RX ADMIN — HYDROMORPHONE HYDROCHLORIDE 1 MG: 1 INJECTION, SOLUTION INTRAMUSCULAR; INTRAVENOUS; SUBCUTANEOUS at 05:12
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
## 2023-12-18 ENCOUNTER — HOSPITAL ENCOUNTER (EMERGENCY)
Facility: HOSPITAL | Age: 63
Discharge: HOME | End: 2023-12-18
Payer: MEDICARE

## 2023-12-18 VITALS
DIASTOLIC BLOOD PRESSURE: 78 MMHG | SYSTOLIC BLOOD PRESSURE: 131 MMHG | WEIGHT: 293 LBS | OXYGEN SATURATION: 98 % | RESPIRATION RATE: 18 BRPM | HEIGHT: 64 IN | BODY MASS INDEX: 50.02 KG/M2 | TEMPERATURE: 99 F | HEART RATE: 89 BPM

## 2023-12-18 DIAGNOSIS — T14.90XA TRAUMA: ICD-10-CM

## 2023-12-18 DIAGNOSIS — S80.01XA CONTUSION OF RIGHT KNEE, INITIAL ENCOUNTER: Primary | ICD-10-CM

## 2023-12-18 PROCEDURE — 25000003 PHARM REV CODE 250: Performed by: NURSE PRACTITIONER

## 2023-12-18 PROCEDURE — 99283 EMERGENCY DEPT VISIT LOW MDM: CPT

## 2023-12-18 RX ORDER — ACETAMINOPHEN AND CODEINE PHOSPHATE 300; 30 MG/1; MG/1
1 TABLET ORAL
Status: COMPLETED | OUTPATIENT
Start: 2023-12-18 | End: 2023-12-18

## 2023-12-18 RX ADMIN — ACETAMINOPHEN AND CODEINE PHOSPHATE 1 TABLET: 300; 30 TABLET ORAL at 01:12

## 2023-12-18 NOTE — ED NOTES
Pt c/o right knee pain after mechanical fall today. No obvious deformities noted. Pt capillary refill <3. Pt acyanotic. No acute distress noted.

## 2023-12-18 NOTE — ED PROVIDER NOTES
"Encounter Date: 12/18/2023       History     Chief Complaint   Patient presents with    Leg Injury     Mechanical fall with L leg/thigh and knee pain and discomfort. "Unable to bear weight     C/o  fall with L leg/thigh and knee pain and discomfort. "Unable to bear weight        Review of patient's allergies indicates:   Allergen Reactions    Cephalexin      Other reaction(s): unknown    Erythromycin      Other reaction(s): unknown    Hydrocodone     Metronidazole      Other reaction(s): Vomiting     Past Medical History:   Diagnosis Date    Anxiety disorder, unspecified     Depression     Hypertension     Paroxysmal atrial fibrillation     Urinary incontinence      Past Surgical History:   Procedure Laterality Date    ABDOMINAL SURGERY      BILATERAL TUBAL LIGATION Bilateral     BOWEL RESECTION      CHOLECYSTECTOMY      HERNIA REPAIR      x2    TUBAL LIGATION       No family history on file.  Social History     Tobacco Use    Smoking status: Never     Passive exposure: Never    Smokeless tobacco: Never   Substance Use Topics    Alcohol use: Never    Drug use: Never     Review of Systems   Musculoskeletal:         Lt thigh and knee pain   All other systems reviewed and are negative.      Physical Exam     Initial Vitals [12/18/23 1225]   BP Pulse Resp Temp SpO2   139/82 93 19 98.6 °F (37 °C) 99 %      MAP       --         Physical Exam    Nursing note and vitals reviewed.  Constitutional: She appears well-developed and well-nourished.   HENT:   Head: Normocephalic and atraumatic.   Eyes: Pupils are equal, round, and reactive to light.   Neck:   Normal range of motion.  Cardiovascular:  Normal rate, regular rhythm and normal heart sounds.           Pulmonary/Chest: Breath sounds normal.   Musculoskeletal:         General: Normal range of motion.      Cervical back: Normal range of motion.      Comments: Moderate rt knee tenderness     Neurological: She is alert and oriented to person, place, and time.   Skin: Skin is " warm and dry. Capillary refill takes less than 2 seconds.   Psychiatric: She has a normal mood and affect. Her behavior is normal. Judgment and thought content normal.         ED Course   Procedures  Labs Reviewed - No data to display       Imaging Results              X-Ray Tibia Fibula 2 View Right (Final result)  Result time 12/18/23 13:43:26      Final result by Cassie Arteaga III, MD (12/18/23 13:43:26)                   Impression:      1. Chronic changes are present as described above.  See above comments.      Electronically signed by: Cassie Arteaga  Date:    12/18/2023  Time:    13:43               Narrative:    EXAMINATION:  STUDY: XR TIBIA FIBULA 2 VIEW RIGHT    CLINICAL HISTORY AND TECHNIQUE:    * Itz Jain RT on 12/18/2023  1:17 PM CST: X 1 HOUR PTA  -FALL  -JUST BELOW LEFT KNEE PAIN, LIMITED ROM      COMPARISON:  None    FINDINGS:  Fairly prominent degenerative changes are noted within the medial compartment of the right knee and to a lesser extent the lateral compartment and patellofemoral compartment.  I see no definite fractures, dislocations, or other significant abnormalities.                                       X-Ray Knee Complete 4 Or More Views Right (Final result)  Result time 12/18/23 13:44:15   Procedure changed from X-Ray Knee 3 View Right     Final result by Cassie Arteaga III, MD (12/18/23 13:44:15)                   Impression:      1. Chronic changes are present as described above.  See above comments.      Electronically signed by: Cassie Arteaga  Date:    12/18/2023  Time:    13:44               Narrative:    EXAMINATION:  STUDY: XR KNEE COMP 4 OR MORE VIEWS RIGHT    CLINICAL HISTORY AND TECHNIQUE:    * Itz Jain RT on 12/18/2023  1:17 PM CST: X 1 HOUR PTA  -FALL  -JUST BELOW LEFT KNEE PAIN, LIMITED ROM      COMPARISON:  None    FINDINGS:  There is mild demineralization of the skeletal structures with fairly prominent degenerative changes noted involving the medial compartment of  the right knee (significant joint space narrowing with moderate subchondral sclerosis and at least moderate hypertrophic spur formation) and to a lesser extent the lateral compartment and patellofemoral compartments.  I see no definite fractures, dislocations, or other significant abnormalities. No significant synovial effusion is noted.                                       Medications   acetaminophen-codeine 300-30mg per tablet 1 tablet (1 tablet Oral Given 12/18/23 7284)     Medical Decision Making  Problems Addressed:  Contusion of right knee, initial encounter: acute illness or injury  Trauma: acute illness or injury    Amount and/or Complexity of Data Reviewed  Radiology: ordered.    Risk  Prescription drug management.                                      Clinical Impression:  Final diagnoses:  [T14.90XA] Trauma  [S80.01XA] Contusion of right knee, initial encounter (Primary)          ED Disposition Condition    Discharge Stable          ED Prescriptions    None       Follow-up Information       Follow up With Specialties Details Why Contact Info    Janene Ford, FNP-C Family Medicine Call  As needed [address]  [address]  [facility] [address]  [phone]               Jacqueline Canseco, SEGUNDO  12/18/23 9030

## 2024-02-07 ENCOUNTER — HOSPITAL ENCOUNTER (OUTPATIENT)
Dept: RADIOLOGY | Facility: HOSPITAL | Age: 64
Discharge: HOME | End: 2024-02-07
Attending: INTERNAL MEDICINE
Payer: MEDICARE

## 2024-02-07 DIAGNOSIS — R94.39 ABNORMAL BALLISTOCARDIOGRAM: ICD-10-CM

## 2024-02-07 PROCEDURE — 71046 X-RAY EXAM CHEST 2 VIEWS: CPT | Mod: TC

## 2024-02-29 RX ORDER — RANOLAZINE 500 MG/1
1000 TABLET, EXTENDED RELEASE ORAL 2 TIMES DAILY
COMMUNITY
Start: 2024-02-07 | End: 2024-03-11

## 2024-02-29 RX ORDER — DOCUSATE SODIUM 100 MG/1
100 CAPSULE, LIQUID FILLED ORAL DAILY
COMMUNITY

## 2024-03-05 ENCOUNTER — HOSPITAL ENCOUNTER (OUTPATIENT)
Facility: HOSPITAL | Age: 64
Discharge: HOME | End: 2024-03-05
Attending: INTERNAL MEDICINE | Admitting: INTERNAL MEDICINE
Payer: MEDICARE

## 2024-03-05 DIAGNOSIS — R94.39 ABNORMAL CARDIOVASCULAR STRESS TEST: ICD-10-CM

## 2024-03-05 DIAGNOSIS — R07.9 CHEST PAIN: ICD-10-CM

## 2024-03-05 LAB
OHS QRS DURATION: 76 MS
OHS QTC CALCULATION: 433 MS

## 2024-03-05 PROCEDURE — C1769 GUIDE WIRE: HCPCS | Performed by: INTERNAL MEDICINE

## 2024-03-05 PROCEDURE — C1887 CATHETER, GUIDING: HCPCS | Performed by: INTERNAL MEDICINE

## 2024-03-05 PROCEDURE — 99152 MOD SED SAME PHYS/QHP 5/>YRS: CPT | Performed by: INTERNAL MEDICINE

## 2024-03-05 PROCEDURE — 93005 ELECTROCARDIOGRAM TRACING: CPT

## 2024-03-05 PROCEDURE — 63600175 PHARM REV CODE 636 W HCPCS: Performed by: INTERNAL MEDICINE

## 2024-03-05 PROCEDURE — 93010 ELECTROCARDIOGRAM REPORT: CPT | Mod: ,,, | Performed by: INTERNAL MEDICINE

## 2024-03-05 PROCEDURE — C1894 INTRO/SHEATH, NON-LASER: HCPCS | Performed by: INTERNAL MEDICINE

## 2024-03-05 PROCEDURE — 25000003 PHARM REV CODE 250

## 2024-03-05 PROCEDURE — 25500020 PHARM REV CODE 255: Performed by: INTERNAL MEDICINE

## 2024-03-05 PROCEDURE — 25000003 PHARM REV CODE 250: Performed by: INTERNAL MEDICINE

## 2024-03-05 PROCEDURE — 93458 L HRT ARTERY/VENTRICLE ANGIO: CPT | Performed by: INTERNAL MEDICINE

## 2024-03-05 PROCEDURE — 27201423 OPTIME MED/SURG SUP & DEVICES STERILE SUPPLY: Performed by: INTERNAL MEDICINE

## 2024-03-05 RX ORDER — ASPIRIN 325 MG
325 TABLET ORAL
Status: DISCONTINUED | OUTPATIENT
Start: 2024-03-05 | End: 2024-03-05 | Stop reason: HOSPADM

## 2024-03-05 RX ORDER — SODIUM CHLORIDE 9 MG/ML
INJECTION, SOLUTION INTRAVENOUS CONTINUOUS
Status: ACTIVE | OUTPATIENT
Start: 2024-03-05 | End: 2024-03-05

## 2024-03-05 RX ORDER — DIPHENHYDRAMINE HCL 25 MG
50 CAPSULE ORAL
Status: DISCONTINUED | OUTPATIENT
Start: 2024-03-05 | End: 2024-03-05 | Stop reason: HOSPADM

## 2024-03-05 RX ORDER — MIDAZOLAM HYDROCHLORIDE 1 MG/ML
INJECTION, SOLUTION INTRAMUSCULAR; INTRAVENOUS
Status: DISCONTINUED | OUTPATIENT
Start: 2024-03-05 | End: 2024-03-05 | Stop reason: HOSPADM

## 2024-03-05 RX ORDER — SODIUM CHLORIDE 9 MG/ML
INJECTION, SOLUTION INTRAVENOUS
Status: DISCONTINUED | OUTPATIENT
Start: 2024-03-05 | End: 2024-03-05 | Stop reason: HOSPADM

## 2024-03-05 RX ORDER — LIDOCAINE HYDROCHLORIDE 20 MG/ML
INJECTION, SOLUTION EPIDURAL; INFILTRATION; INTRACAUDAL; PERINEURAL
Status: DISCONTINUED | OUTPATIENT
Start: 2024-03-05 | End: 2024-03-05 | Stop reason: HOSPADM

## 2024-03-05 RX ORDER — DIAZEPAM 5 MG/1
10 TABLET ORAL
Status: DISCONTINUED | OUTPATIENT
Start: 2024-03-05 | End: 2024-03-05 | Stop reason: HOSPADM

## 2024-03-05 RX ORDER — HEPARIN SODIUM 1000 [USP'U]/ML
INJECTION, SOLUTION INTRAVENOUS; SUBCUTANEOUS
Status: DISCONTINUED | OUTPATIENT
Start: 2024-03-05 | End: 2024-03-05 | Stop reason: HOSPADM

## 2024-03-05 RX ORDER — FENTANYL CITRATE 50 UG/ML
INJECTION, SOLUTION INTRAMUSCULAR; INTRAVENOUS
Status: DISCONTINUED | OUTPATIENT
Start: 2024-03-05 | End: 2024-03-05 | Stop reason: HOSPADM

## 2024-03-05 RX ADMIN — ASPIRIN 325 MG ORAL TABLET 325 MG: 325 PILL ORAL at 08:03

## 2024-03-05 RX ADMIN — DIAZEPAM 10 MG: 5 TABLET ORAL at 08:03

## 2024-03-05 RX ADMIN — DIPHENHYDRAMINE HYDROCHLORIDE 50 MG: 25 CAPSULE ORAL at 08:03

## 2024-03-05 RX ADMIN — SODIUM CHLORIDE: 9 INJECTION, SOLUTION INTRAVENOUS at 08:03

## 2024-03-05 NOTE — DISCHARGE SUMMARY
[facility]-Cath Lab/EP  Discharge Note  Short Stay    Procedure(s) (LRB):  ANGIOGRAM, CORONARY ARTERY (Right)      OUTCOME: Patient tolerated treatment/procedure well without complication and is now ready for discharge.    DISPOSITION: Home or Self Care    FINAL DIAGNOSIS:  CAD    FOLLOWUP: In clinic    DISCHARGE INSTRUCTIONS:  radial band     Clinical Reference Documents Added to Patient Instructions         Document    CARDIAC CATHETERIZATION DISCHARGE INSTRUCTIONS (ENGLISH)            TIME SPENT ON DISCHARGE: 35 minutes

## 2024-03-05 NOTE — Clinical Note
The DP pulses were 1+ bilaterally. The PT pulses were 1+ bilaterally. The radial pulses were +2 and allens test positive bilaterally.

## 2024-03-05 NOTE — DISCHARGE INSTRUCTIONS
Resume Eliquis_tomorrow, Wednesday March 6th    WRIST ACCESS: No lifting over 5 lbs for 3 days with that arm/hand, wait 24 hrs before driving, avoid flexing at the wrist such as hammering, playing tennis, or swinging objects.      Take it easy for the rest of the day. Keep arm or leg straight as much as possible. If catheter was put in your groin, avoid using stairs for a few days. When you must use stairs, step up with the leg that was not used for the angiogram.     Keep the catheter wound area clean and dry for 24 hours after your angiogram. You may shower 24 hours after your angiogram. Refrain from taking a tub bath, swimming, hot tubs, and submerging in dish water for 5 days. During shower, gently wash your angiogram site with soap and water. If the site is oozing or bleeding slightly, place a small bandage over it to protect your clothes.     If the place where the catheter was inserted starts to bleed, use your hand to put pressure on the bandage. It is better if someone else hold pressure for you. Also, call for help. Hold pressure for 30 minutes, even after bleeding stops. Lie flat for at least an hour. If bleeding does not stop within 15 minutes of holding pressure, you will need to go to the nearest hospital. Do not walk or drive yourself.     Drink plenty of liquids to help your body rid of the dye that was used during the angiogram. Drink eight (8 oz) glasses of water each day. Limit the amount of caffeine you drink such as coffee, tea, and soda. Do not drink alcohol for 24 hours after the test. Taking these actions are critical for the health of your kidneys.     REASONS TO CALL YOUR DOCTOR:    You have shaking, chills, or a body temperature over 101.5 F  The puncture site becomes red or has pus or foul-smelling drainage coming from it.  You have increasing pain at the puncture site. (It is normal to have soreness, but this should get better, not worse).  You have questions or concerns about your  angiogram, medicines, or health condition.     SEEK CARE IMMEDIATELY IF:  The leg or arm used for the angiogram becomes numb, is very painful, or changes color.  The bruise site starts to get bigger, or the area has new swelling.     IT IS AN EMERGENCY:  The puncture site is bleeding and you cannot stop the bleeding.  You have signs of a stroke..new weakness, trouble moving one side of your face or body, new trouble thinking or speaking, and new changes in vision.

## 2024-03-05 NOTE — PLAN OF CARE
Pt has been oriented to the unit. Pt has been told what will happen throughout the cath lab procedure process and was given time to ask questions. Patient will not experience bleeding or hematoma to cath site this visit.

## 2024-03-06 VITALS
TEMPERATURE: 98 F | DIASTOLIC BLOOD PRESSURE: 85 MMHG | SYSTOLIC BLOOD PRESSURE: 124 MMHG | HEIGHT: 65 IN | BODY MASS INDEX: 48.82 KG/M2 | OXYGEN SATURATION: 96 % | RESPIRATION RATE: 15 BRPM | WEIGHT: 293 LBS | HEART RATE: 88 BPM

## 2024-03-11 ENCOUNTER — OFFICE VISIT (OUTPATIENT)
Dept: FAMILY MEDICINE | Facility: CLINIC | Age: 64
End: 2024-03-11
Payer: MEDICARE

## 2024-03-11 VITALS
DIASTOLIC BLOOD PRESSURE: 74 MMHG | TEMPERATURE: 98 F | HEART RATE: 73 BPM | SYSTOLIC BLOOD PRESSURE: 132 MMHG | BODY MASS INDEX: 48.82 KG/M2 | OXYGEN SATURATION: 96 % | HEIGHT: 65 IN | WEIGHT: 293 LBS

## 2024-03-11 DIAGNOSIS — I10 PRIMARY HYPERTENSION: ICD-10-CM

## 2024-03-11 DIAGNOSIS — E66.01 MORBID OBESITY: ICD-10-CM

## 2024-03-11 DIAGNOSIS — G47.33 OBSTRUCTIVE SLEEP APNEA: Primary | ICD-10-CM

## 2024-03-11 DIAGNOSIS — I48.0 PAROXYSMAL ATRIAL FIBRILLATION: ICD-10-CM

## 2024-03-11 DIAGNOSIS — R40.0 DAYTIME SOMNOLENCE: ICD-10-CM

## 2024-03-11 DIAGNOSIS — L30.9 ECZEMA, UNSPECIFIED TYPE: ICD-10-CM

## 2024-03-11 PROCEDURE — 3078F DIAST BP <80 MM HG: CPT | Mod: ,,, | Performed by: NURSE PRACTITIONER

## 2024-03-11 PROCEDURE — 99214 OFFICE O/P EST MOD 30 MIN: CPT | Mod: ,,, | Performed by: NURSE PRACTITIONER

## 2024-03-11 PROCEDURE — 1159F MED LIST DOCD IN RCRD: CPT | Mod: ,,, | Performed by: NURSE PRACTITIONER

## 2024-03-11 PROCEDURE — 1160F RVW MEDS BY RX/DR IN RCRD: CPT | Mod: ,,, | Performed by: NURSE PRACTITIONER

## 2024-03-11 PROCEDURE — 3008F BODY MASS INDEX DOCD: CPT | Mod: ,,, | Performed by: NURSE PRACTITIONER

## 2024-03-11 PROCEDURE — 3075F SYST BP GE 130 - 139MM HG: CPT | Mod: ,,, | Performed by: NURSE PRACTITIONER

## 2024-03-11 RX ORDER — TRIAMCINOLONE ACETONIDE 0.25 MG/G
CREAM TOPICAL 2 TIMES DAILY
Qty: 15 G | Refills: 2 | Status: SHIPPED | OUTPATIENT
Start: 2024-03-11

## 2024-03-11 RX ORDER — RANOLAZINE 1000 MG/1
1000 TABLET, EXTENDED RELEASE ORAL 2 TIMES DAILY
COMMUNITY
Start: 2024-02-07

## 2024-03-11 NOTE — PROGRESS NOTES
Patient ID: Ruchi Sam  : 1960    Chief Complaint: sleep study    Allergies: Patient is allergic to hydrocodone, cephalexin, erythromycin, and metronidazole.     History of Present Illness:  The patient is a 64 y.o. White female who presents to clinic for follow up on sleep study     She was previously dx with JOANIE and was on a CPAP at one point in time. She was unable to wear the mask that was provided and then the machine was recalled so it was returned.     She is obese, has a diagnosis of PAF.  She snores very badly. She reports terrible sleep quality with frequent nighttime awakenings. She is very fatigued during the daytime and has recently fallen asleep while talking on the phone with family members on more than one occasion.       Also has some excessively dry patchy areas to Bilateral upper arms. It is very itchy, seems intermittent. Has tried otc lotions/ointments. No other family member with rash.     Social History:  reports that she has never smoked. She has never been exposed to tobacco smoke. She has never used smokeless tobacco. She reports that she does not drink alcohol and does not use drugs.    Past Medical History:  has a past medical history of Anxiety disorder, unspecified, Bilateral leg edema, Coronary artery disease, Depression, CANDELARIO (dyspnea on exertion), Heart murmur, Hypertension, Mitral murmur, Obesity, unspecified, Obstructive sleep apnea, Paroxysmal atrial fibrillation, Sleep apnea, and Urinary incontinence.    Current Medications:  Current Outpatient Medications   Medication Instructions    ALPRAZolam (XANAX) 1 MG tablet 1 tablet, Oral, Daily    carvediloL (COREG) 6.25 mg, Oral, 2 times daily    docusate sodium (COLACE) 100 mg, Oral, Daily    ELIQUIS 5 mg, Oral, 2 times daily    enalapril (VASOTEC) 20 MG tablet 1 tablet, Oral, Daily    EScitalopram oxalate (LEXAPRO) 20 mg, Oral, Daily    furosemide (LASIX) 40 mg, Oral, 2 times daily    naproxen (NAPROSYN) 500 mg, Oral, 2  "times daily    nitroGLYCERIN (NITROSTAT) 0.4 mg, Sublingual, As needed (PRN)    ranolazine (RANEXA) 1,000 mg, Oral, 2 times daily, 1/2 tablet daily     rosuvastatin (CRESTOR) 5 mg, Oral    tiZANidine (ZANAFLEX) 4 mg, Oral, Every 6 hours PRN    triamcinolone acetonide 0.025% (KENALOG) 0.025 % cream Topical (Top), 2 times daily       Review of Systems   See HPI    Visit Vitals  /74 (BP Location: Left arm, Patient Position: Sitting, BP Method: Medium (Manual))   Pulse 73   Temp 97.5 °F (36.4 °C) (Temporal)   Ht 5' 5" (1.651 m)   Wt (!) 150.4 kg (331 lb 8 oz)   SpO2 96%   BMI 55.16 kg/m²       Physical Exam  Vitals reviewed.   Constitutional:       Appearance: Normal appearance. She is obese.   Eyes:      Conjunctiva/sclera: Conjunctivae normal.   Cardiovascular:      Heart sounds: Normal heart sounds.   Pulmonary:      Breath sounds: Normal breath sounds. No wheezing or rales.   Skin:     General: Skin is warm and dry.   Neurological:      Mental Status: She is oriented to person, place, and time.            3/11/2024    10:38 AM   EPWORTH SLEEPINESS SCALE   Sitting and reading 3   Watching TV 3   Sitting, inactive in a public place (e.g. a theatre or a meeting) 3   As a passenger in a car for an hour without a break 3   Lying down to rest in the afternoon when circumstances permit 3   Sitting and talking to someone 3   Sitting quietly after a lunch without alcohol 3   In a car, while stopped for a few minutes in traffic 1   Total score 22       Labs Reviewed:  Chemistry:  Lab Results   Component Value Date     02/07/2024    K 4.5 02/07/2024    CHLORIDE 105 02/07/2024    BUN 14.0 02/07/2024    CREATININE 0.58 (L) 02/07/2024    EGFRNORACEVR >90 02/07/2024    GLUCOSE 83 02/07/2024    CALCIUM 9.8 02/07/2024    ALKPHOS 100 11/07/2023    LABPROT 7.2 11/07/2023    ALBUMIN 4.2 11/07/2023    AST 27 11/07/2023    ALT 19 11/07/2023    TSH 1.340 11/07/2023        Hematology:  Lab Results   Component Value Date    WBC " 7.28 02/07/2024    RBC 4.36 02/07/2024    HGB 13.1 02/07/2024    HCT 39.0 02/07/2024    MCV 89.4 02/07/2024    MCH 30.0 02/07/2024    MCHC 33.6 02/07/2024    RDW 14.2 02/07/2024     02/07/2024    MPV 10.8 02/07/2024         Assessment & Plan:  1. Obstructive sleep apnea  Overview:  Dx w/ JOANIE in remote past; has not had machine in the last 2+ years.     Assessment & Plan:  Referral for home sleep study.     Orders:  -     Ambulatory referral/consult to Sleep Disorders; Future; Expected date: 03/18/2024    2. Daytime somnolence  -     Ambulatory referral/consult to Sleep Disorders; Future; Expected date: 03/18/2024    3. Morbid obesity  Assessment & Plan:  Body mass index is 55.16 kg/m².  Goal BMI <30.  Exercise 5 times a week for 30 minutes per day.  Avoid soda, simple sugars, excessive rice, potatoes or bread. Limit fast foods and fried foods.  Choose complex carbs in moderation (example: green vegetables, beans, oatmeal). Eat plenty of fresh fruits and vegetables with lean meats daily.  Do not skip meals. Eat a balanced portion size.  Avoid fad diets. Consider permanent healthy life style changes.       Orders:  -     Ambulatory referral/consult to Sleep Disorders; Future; Expected date: 03/18/2024    4. Paroxysmal atrial fibrillation  Overview:  On Eliquis 5 mg BID and Coreg 6.25 BID.     Assessment & Plan:  F/u with Cardiology as scheduled.       5. Primary hypertension  Overview:  On enalapril 20 mg daily, carvedilol 6.25 mg b.i.d.    Assessment & Plan:  Well controlled.   Continue current medication.   Low Sodium Diet (DASH Diet - Less than 2 grams of sodium per day).  Monitor blood pressure daily and log. Report consistent numbers greater than 140/90.  Maintain healthy weight with goal BMI <30. Exercise 30 minutes per day, 5 days per week.          6. Eczema, unspecified type  Comments:  Apply Triamcinalone to dry skin. Also get a good moisturizer and avoid hot baths.  Orders:  -     triamcinolone  acetonide 0.025% (KENALOG) 0.025 % cream; Apply topically 2 (two) times daily.  Dispense: 15 g; Refill: 2         Future Appointments   Date Time Provider Department Center   11/11/2024  8:50 AM LAB, [facility] LABORATORY DRAW STATION [facility] R[facility]   11/18/2024  2:30 PM Janene Ford[facility]       Follow up for Keep appointment as scheduled. Call sooner if needed.    BERENICE Christine    Lab Frequency Next Occurrence   CBC Auto Differential Once 11/14/2024   Comprehensive Metabolic Panel Once 11/14/2024   Lipid Panel Once 11/14/2024   TSH Once 11/14/2024   Hemoglobin A1C Once 11/14/2024

## 2024-03-11 NOTE — ASSESSMENT & PLAN NOTE

## 2024-03-11 NOTE — ASSESSMENT & PLAN NOTE
Well controlled.   Continue current medication.   Low Sodium Diet (DASH Diet - Less than 2 grams of sodium per day).  Monitor blood pressure daily and log. Report consistent numbers greater than 140/90.  Maintain healthy weight with goal BMI <30. Exercise 30 minutes per day, 5 days per week.

## 2024-03-12 ENCOUNTER — PATIENT MESSAGE (OUTPATIENT)
Dept: FAMILY MEDICINE | Facility: CLINIC | Age: 64
End: 2024-03-12
Payer: MEDICARE

## 2024-04-04 ENCOUNTER — OUTSIDE PLACE OF SERVICE (OUTPATIENT)
Dept: PULMONOLOGY | Facility: CLINIC | Age: 64
End: 2024-04-04
Payer: MEDICARE

## 2024-04-09 PROCEDURE — 95806 SLEEP STUDY UNATT&RESP EFFT: CPT | Mod: 26,,, | Performed by: INTERNAL MEDICINE

## 2024-04-30 ENCOUNTER — TELEPHONE (OUTPATIENT)
Dept: FAMILY MEDICINE | Facility: CLINIC | Age: 64
End: 2024-04-30
Payer: MEDICARE

## 2024-04-30 NOTE — TELEPHONE ENCOUNTER
I called her and told her that on 4/24, I faxed an order for PAP and supplies so she has sleep apnea. [facility] so she can call to check on the status of it.

## 2024-05-13 ENCOUNTER — HOSPITAL ENCOUNTER (EMERGENCY)
Facility: HOSPITAL | Age: 64
Discharge: HOME | End: 2024-05-13
Payer: MEDICARE

## 2024-05-13 VITALS
DIASTOLIC BLOOD PRESSURE: 80 MMHG | WEIGHT: 293 LBS | OXYGEN SATURATION: 94 % | TEMPERATURE: 99 F | HEIGHT: 65 IN | RESPIRATION RATE: 20 BRPM | SYSTOLIC BLOOD PRESSURE: 136 MMHG | HEART RATE: 78 BPM | BODY MASS INDEX: 48.82 KG/M2

## 2024-05-13 DIAGNOSIS — K52.9 GASTROENTERITIS: Primary | ICD-10-CM

## 2024-05-13 LAB
ALBUMIN SERPL-MCNC: 4.6 G/DL (ref 3.4–5)
ALBUMIN/GLOB SERPL: 1.2 RATIO
ALP SERPL-CCNC: 120 UNIT/L (ref 50–144)
ALT SERPL-CCNC: 27 UNIT/L (ref 1–45)
ANION GAP SERPL CALC-SCNC: 13 MEQ/L (ref 2–13)
AST SERPL-CCNC: 34 UNIT/L (ref 14–36)
BASOPHILS # BLD AUTO: 0.01 X10(3)/MCL (ref 0.01–0.08)
BASOPHILS NFR BLD AUTO: 0.1 % (ref 0.1–1.2)
BILIRUB SERPL-MCNC: 0.9 MG/DL (ref 0–1)
BUN SERPL-MCNC: 19 MG/DL (ref 7–20)
CALCIUM SERPL-MCNC: 9.8 MG/DL (ref 8.4–10.2)
CHLORIDE SERPL-SCNC: 108 MMOL/L (ref 98–110)
CO2 SERPL-SCNC: 19 MMOL/L (ref 21–32)
CREAT SERPL-MCNC: 0.72 MG/DL (ref 0.66–1.25)
CREAT/UREA NIT SERPL: 26 (ref 12–20)
EOSINOPHIL # BLD AUTO: 0.01 X10(3)/MCL (ref 0.04–0.36)
EOSINOPHIL NFR BLD AUTO: 0.1 % (ref 0.7–7)
ERYTHROCYTE [DISTWIDTH] IN BLOOD BY AUTOMATED COUNT: 13.5 % (ref 11–14.5)
GFR SERPLBLD CREATININE-BSD FMLA CKD-EPI: >90 ML/MIN/1.73/M2
GLOBULIN SER-MCNC: 3.8 GM/DL (ref 2–3.9)
GLUCOSE SERPL-MCNC: 147 MG/DL (ref 70–115)
HCT VFR BLD AUTO: 44.2 % (ref 36–48)
HGB BLD-MCNC: 14.5 G/DL (ref 11.8–16)
IMM GRANULOCYTES # BLD AUTO: 0.01 X10(3)/MCL (ref 0–0.03)
IMM GRANULOCYTES NFR BLD AUTO: 0.1 % (ref 0–0.5)
LIPASE SERPL-CCNC: 79 U/L (ref 23–300)
LYMPHOCYTES # BLD AUTO: 2.93 X10(3)/MCL (ref 1.16–3.74)
LYMPHOCYTES NFR BLD AUTO: 29.7 % (ref 20–55)
MCH RBC QN AUTO: 30.5 PG (ref 27–34)
MCHC RBC AUTO-ENTMCNC: 32.8 G/DL (ref 31–37)
MCV RBC AUTO: 92.9 FL (ref 79–99)
MONOCYTES # BLD AUTO: 0.63 X10(3)/MCL (ref 0.24–0.36)
MONOCYTES NFR BLD AUTO: 6.4 % (ref 4.7–12.5)
NEUTROPHILS # BLD AUTO: 6.29 X10(3)/MCL (ref 1.56–6.13)
NEUTROPHILS NFR BLD AUTO: 63.6 % (ref 37–73)
PLATELET # BLD AUTO: 240 X10(3)/MCL (ref 140–371)
PMV BLD AUTO: 10 FL (ref 9.4–12.4)
POTASSIUM SERPL-SCNC: 4.1 MMOL/L (ref 3.5–5.1)
PROT SERPL-MCNC: 8.4 GM/DL (ref 6.3–8.2)
RBC # BLD AUTO: 4.76 X10(6)/MCL (ref 4–5.1)
SODIUM SERPL-SCNC: 140 MMOL/L (ref 136–145)
WBC # SPEC AUTO: 9.88 X10(3)/MCL (ref 4–11.5)

## 2024-05-13 PROCEDURE — 85025 COMPLETE CBC W/AUTO DIFF WBC: CPT

## 2024-05-13 PROCEDURE — 63600175 PHARM REV CODE 636 W HCPCS

## 2024-05-13 PROCEDURE — 96361 HYDRATE IV INFUSION ADD-ON: CPT

## 2024-05-13 PROCEDURE — 25000003 PHARM REV CODE 250

## 2024-05-13 PROCEDURE — 80053 COMPREHEN METABOLIC PANEL: CPT

## 2024-05-13 PROCEDURE — 83690 ASSAY OF LIPASE: CPT

## 2024-05-13 PROCEDURE — 96374 THER/PROPH/DIAG INJ IV PUSH: CPT

## 2024-05-13 PROCEDURE — 96372 THER/PROPH/DIAG INJ SC/IM: CPT | Mod: 59

## 2024-05-13 PROCEDURE — 99285 EMERGENCY DEPT VISIT HI MDM: CPT | Mod: 25

## 2024-05-13 RX ORDER — PROMETHAZINE HYDROCHLORIDE 25 MG/1
25 TABLET ORAL EVERY 6 HOURS PRN
Qty: 20 TABLET | Refills: 0 | Status: SHIPPED | OUTPATIENT
Start: 2024-05-13

## 2024-05-13 RX ORDER — SODIUM CHLORIDE 9 MG/ML
1000 INJECTION, SOLUTION INTRAVENOUS
Status: COMPLETED | OUTPATIENT
Start: 2024-05-13 | End: 2024-05-13

## 2024-05-13 RX ORDER — HYDROXYZINE 50 MG/ML
100 INJECTION, SOLUTION INTRAMUSCULAR ONCE
Status: COMPLETED | OUTPATIENT
Start: 2024-05-13 | End: 2024-05-13

## 2024-05-13 RX ORDER — PROCHLORPERAZINE EDISYLATE 5 MG/ML
10 INJECTION INTRAMUSCULAR; INTRAVENOUS
Status: COMPLETED | OUTPATIENT
Start: 2024-05-13 | End: 2024-05-13

## 2024-05-13 RX ADMIN — SODIUM CHLORIDE 1000 ML: 9 INJECTION, SOLUTION INTRAVENOUS at 08:05

## 2024-05-13 RX ADMIN — PROCHLORPERAZINE EDISYLATE 10 MG: 5 INJECTION INTRAMUSCULAR; INTRAVENOUS at 08:05

## 2024-05-13 RX ADMIN — HYDROXYZINE HYDROCHLORIDE 100 MG: 50 INJECTION, SOLUTION INTRAMUSCULAR at 08:05

## 2024-05-14 NOTE — ED PROVIDER NOTES
Encounter Date: 5/13/2024       History     Chief Complaint   Patient presents with    Abdominal Pain    Vomiting     Pt reports she has been having center abd pain and vomiting since yesterday evening. Cannot hold anything down.      64-year-old female presents complaining of nausea and vomiting since last night at about 1700.  She has some pain in her central abdomen as well.  Last bowel movement was just prior to arrival, and was loose.  There has been no constipation.  Of note, she had a clean coronary angiogram 2 months ago.    The history is provided by the patient.     Review of patient's allergies indicates:   Allergen Reactions    Hydrocodone Other (See Comments)     Increases blood pressure very high.    Cephalexin Nausea And Vomiting     Other reaction(s): unknown    Erythromycin Nausea And Vomiting     Other reaction(s): unknown    Metronidazole Nausea And Vomiting     Other reaction(s): Vomiting     Past Medical History:   Diagnosis Date    Anxiety disorder, unspecified     Bilateral leg edema     Coronary artery disease     Depression     CANDELARIO (dyspnea on exertion)     Heart murmur     Hypertension     Mitral murmur     Obesity, unspecified     Obstructive sleep apnea     Paroxysmal atrial fibrillation     Sleep apnea     Urinary incontinence      Past Surgical History:   Procedure Laterality Date    ABDOMINAL SURGERY      Removed hernia mesh and replaced.    BILATERAL TUBAL LIGATION Bilateral     BOWEL RESECTION      CHOLECYSTECTOMY      CORONARY ANGIOGRAPHY Right 3/5/2024    Procedure: ANGIOGRAM, CORONARY ARTERY;  Surgeon: Norm Vásquez MD;  Location: [facility] CATH LAB;  Service: Cardiology;  Laterality: Right;    FISTULECTOMY      hemoroidectomy      HERNIA REPAIR      x2    LEFT HEART CATHETERIZATION Left 3/5/2024    Procedure: Left heart cath;  Surgeon: Norm Vásquez MD;  Location: [facility] CATH LAB;  Service: Cardiology;  Laterality: Left;    TONSILLECTOMY      TUBAL LIGATION       No family history  on file.  Social History     Tobacco Use    Smoking status: Never     Passive exposure: Never    Smokeless tobacco: Never   Substance Use Topics    Alcohol use: Never    Drug use: Never     Review of Systems   Constitutional:  Negative for fever.   HENT:  Negative for sore throat.    Respiratory:  Negative for shortness of breath.    Cardiovascular:  Negative for chest pain.   Gastrointestinal:  Positive for abdominal pain, diarrhea, nausea and vomiting. Negative for abdominal distention and constipation.   Genitourinary:  Negative for dysuria.   Musculoskeletal:  Negative for back pain.   Skin:  Negative for rash.   Neurological:  Negative for weakness.   Hematological:  Does not bruise/bleed easily.   All other systems reviewed and are negative.      Physical Exam     Initial Vitals [05/13/24 1939]   BP Pulse Resp Temp SpO2   (!) 161/66 108 (!) 22 98.8 °F (37.1 °C) 96 %      MAP       --         Physical Exam    Nursing note and vitals reviewed.  Constitutional: Vital signs are normal. She appears well-developed and well-nourished. She is cooperative.   She is morbidly obese.  She appears anxious, but is in no apparent distress.   HENT:   Head: Normocephalic and atraumatic.   Mouth/Throat: Oropharynx is clear and moist.   Eyes: Conjunctivae, EOM and lids are normal. Pupils are equal, round, and reactive to light.   Neck: Trachea normal. Neck supple.   Normal range of motion.  Cardiovascular:  Regular rhythm, normal heart sounds and intact distal pulses.           There is mild tachycardia   Pulmonary/Chest: Breath sounds normal.   Abdominal: Abdomen is soft. Bowel sounds are normal. She exhibits no distension and no mass. There is no abdominal tenderness. There is no rebound and no guarding.   Musculoskeletal:         General: Normal range of motion.      Cervical back: Normal, normal range of motion and neck supple.      Lumbar back: Normal.     Neurological: She is alert and oriented to person, place, and time.  She has normal strength. Coordination normal. GCS score is 15. GCS eye subscore is 4. GCS verbal subscore is 5. GCS motor subscore is 6.   Skin: Skin is warm, dry and intact. Capillary refill takes less than 2 seconds.   Psychiatric: Her speech is normal and behavior is normal. Judgment and thought content normal. Cognition and memory are normal.   She appears a bit anxious         ED Course   Procedures  Labs Reviewed   COMPREHENSIVE METABOLIC PANEL - Abnormal; Notable for the following components:       Result Value    CO2 19 (*)     Glucose 147 (*)     Protein Total 8.4 (*)     BUN/Creatinine Ratio 26 (*)     All other components within normal limits   CBC WITH DIFFERENTIAL - Abnormal; Notable for the following components:    Eos % 0.1 (*)     Neut # 6.29 (*)     Mono # 0.63 (*)     Eos # 0.01 (*)     All other components within normal limits   LIPASE - Normal   CBC W/ AUTO DIFFERENTIAL    Narrative:     The following orders were created for panel order CBC auto differential.  Procedure                               Abnormality         Status                     ---------                               -----------         ------                     CBC with Differential[[id number]]       Abnormal            Final result                 Please view results for these tests on the individual orders.          Imaging Results              CT Abdomen Pelvis  Without Contrast (Final result)  Result time 05/13/24 20:13:05      Final result by Dex Roger MD (05/13/24 20:13:05)                   Impression:        1. Postsurgical findings compatible with a previous ventral hernia repair with what appears to be disruption of the mesh in the midline of the abdomen near the umbilicus which allows for knuckle of small bowel to extend through the opening however I see no evidence of bowel obstruction.    2.  Fluid is present in loops of small and large bowel is for distal as the rectal region and, having the appearance of  gastroenteritis.    3.  Scattered small colonic diverticuli.    n/a    CATEGORY: n/a    The following dose reduction techniques are used for all CT at [facility]:    1.   Automated exposure control.    2.   Adjustment of the mA and/or kV according to patient size.    3.   Use of iterative reconstruction technique.      Electronically signed by: Dex Roger  Date:    05/13/2024  Time:    20:13               Narrative:    EXAMINATION:  CT ABDOMEN PELVIS WITHOUT CONTRAST    CLINICAL HISTORY:  Bowel obstruction suspected;    TECHNIQUE:  Low dose axial images, sagittal and coronal reformations were obtained from the lung bases to the pubic symphysis.  Oral contrast was not administered.    COMPARISON:  02/24/2023    FINDINGS:  Liver:  No clinically significant abnormalities noted.    Gallbladder/Biliary System:  Compatible with a previous cholecystectomy.    Spleen:  No clinically significant abnormalities noted.    Adrenal glands:  No clinically significant abnormalities noted.    Pancreas:  No clinically significant abnormalities noted.    Kidneys/Urinary Tract:  No clinically significant abnormalities noted.    Urinary bladder:  No clinically significant abnormalities noted.    Prostate gland/uterus and ovaries:  No clinically significant abnormalities noted.    GI tract:  Postsurgical findings are present compatible with a previous ventral hernia repair with what appears to be previously seen disruption of the mash in the midline of the abdomen near the umbilical region which allows for a knuckle of small bowel to extend through the opening.  Small colonic diverticuli are noted diffusely.  Fluid is present within loops of large and small bowel as far distal as the rectal region raising the possibility for the presence of gastroenteritis.    Vascular structures:  Mild atherosclerotic calcification is present involving the aorta and its major branches.    Musculoskeletal structures:  Moderate  bony demineralization is present with moderate degenerative findings throughout the spine.    Miscellaneous:  No clinically significant abnormalities noted.                                       Medications   hydrOXYzine injection 100 mg (100 mg Intramuscular Given 5/13/24 2003)   prochlorperazine injection Soln 10 mg (10 mg Intravenous Given 5/13/24 2003)   0.9%  NaCl infusion (1,000 mLs Intravenous New Bag 5/13/24 2002)     Medical Decision Making  Vomiting for over 24 hours, loose bowel movement just prior to arrival, central abdominal pain  Differential diagnosis:  Obstruction, gastritis, pancreatitis, gastroenteritis  Antiemetics, IV fluids  CT, labs    Amount and/or Complexity of Data Reviewed  Labs: ordered. Decision-making details documented in ED Course.  Radiology: ordered. Decision-making details documented in ED Course.    Risk  Prescription drug management.               ED Course as of 05/13/24 2037   Mon May 13, 2024   2016 CT Abdomen Pelvis  Without Contrast  No obstruction, findings consistent with gastroenteritis. [TM]   2032 CBC auto differential(!) [TM]   2032 CBC auto differential(!)  No leukocytosis, H and H a bit higher than 4 months ago. [TM]   2036 Comprehensive metabolic panel(!)  No significant dehydration on the CMP. [TM]      ED Course User Index  [TM] Anderson Raya MD                           Clinical Impression:  Final diagnoses:  [K52.9] Gastroenteritis (Primary)          ED Disposition Condition    Discharge Good          ED Prescriptions       Medication Sig Dispense Start Date End Date Auth. Provider    promethazine (PHENERGAN) 25 MG tablet Take 1 tablet (25 mg total) by mouth every 6 (six) hours as needed for Nausea. 20 tablet 5/13/2024 -- Anderson Raya MD          Follow-up Information       Follow up With Specialties Details Why Contact Info    Janene Ford, FNP-C Family Medicine Call in 1 day  [address]  [address] [facility] [id number] [phone]                Anderson Raya MD  05/13/24 2038

## 2024-06-11 ENCOUNTER — OFFICE VISIT (OUTPATIENT)
Dept: FAMILY MEDICINE | Facility: CLINIC | Age: 64
End: 2024-06-11
Payer: MEDICARE

## 2024-06-11 VITALS
OXYGEN SATURATION: 95 % | WEIGHT: 293 LBS | TEMPERATURE: 97 F | BODY MASS INDEX: 48.82 KG/M2 | SYSTOLIC BLOOD PRESSURE: 134 MMHG | DIASTOLIC BLOOD PRESSURE: 80 MMHG | HEART RATE: 77 BPM | HEIGHT: 65 IN

## 2024-06-11 DIAGNOSIS — I48.0 PAROXYSMAL ATRIAL FIBRILLATION: ICD-10-CM

## 2024-06-11 DIAGNOSIS — F33.42 RECURRENT MAJOR DEPRESSIVE DISORDER, IN FULL REMISSION: ICD-10-CM

## 2024-06-11 DIAGNOSIS — E66.01 MORBID OBESITY: ICD-10-CM

## 2024-06-11 DIAGNOSIS — G47.33 OBSTRUCTIVE SLEEP APNEA: Primary | ICD-10-CM

## 2024-06-11 DIAGNOSIS — F41.1 GENERALIZED ANXIETY DISORDER: ICD-10-CM

## 2024-06-11 PROBLEM — S29.9XXA CHEST WALL INJURY, INITIAL ENCOUNTER: Status: RESOLVED | Noted: 2023-12-07 | Resolved: 2024-06-11

## 2024-06-11 PROBLEM — Z12.11 ENCOUNTER FOR SCREENING FOR MALIGNANT NEOPLASM OF COLON: Status: RESOLVED | Noted: 2023-04-10 | Resolved: 2024-06-11

## 2024-06-11 PROBLEM — M54.50 LOW BACK PAIN: Status: RESOLVED | Noted: 2023-04-10 | Resolved: 2024-06-11

## 2024-06-11 PROBLEM — T14.90XA TRAUMA: Status: RESOLVED | Noted: 2023-02-22 | Resolved: 2024-06-11
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
## 2024-06-11 PROBLEM — K52.9 GASTROENTERITIS: Status: RESOLVED | Noted: 2024-05-13 | Resolved: 2024-06-11

## 2024-06-11 PROBLEM — G44.319 ACUTE POST-TRAUMATIC HEADACHE, NOT INTRACTABLE: Status: RESOLVED | Noted: 2023-12-07 | Resolved: 2024-06-11

## 2024-06-11 PROCEDURE — 3079F DIAST BP 80-89 MM HG: CPT | Mod: ,,, | Performed by: NURSE PRACTITIONER

## 2024-06-11 PROCEDURE — 99214 OFFICE O/P EST MOD 30 MIN: CPT | Mod: ,,, | Performed by: NURSE PRACTITIONER

## 2024-06-11 PROCEDURE — 1159F MED LIST DOCD IN RCRD: CPT | Mod: ,,, | Performed by: NURSE PRACTITIONER

## 2024-06-11 PROCEDURE — 3075F SYST BP GE 130 - 139MM HG: CPT | Mod: ,,, | Performed by: NURSE PRACTITIONER

## 2024-06-11 PROCEDURE — 3008F BODY MASS INDEX DOCD: CPT | Mod: ,,, | Performed by: NURSE PRACTITIONER

## 2024-06-11 PROCEDURE — 1160F RVW MEDS BY RX/DR IN RCRD: CPT | Mod: ,,, | Performed by: NURSE PRACTITIONER

## 2024-06-11 PROCEDURE — 4010F ACE/ARB THERAPY RXD/TAKEN: CPT | Mod: ,,, | Performed by: NURSE PRACTITIONER

## 2024-06-11 NOTE — ASSESSMENT & PLAN NOTE

## 2024-06-11 NOTE — PROGRESS NOTES
Patient ID: Ruchi Sam  : 1960    Chief Complaint: CPAP (Follow up)    Allergies: Patient is allergic to hydrocodone, cephalexin, erythromycin, and metronidazole.     History of Present Illness:  The patient is a 64 y.o. White female who presents to clinic for follow up on CPAP (Follow up)     When seen in March she had complained of poor sleep quality with frequent nighttime awakenings and excessive daytime fatigue.  She is obese, has a diagnosis of paroxysmal atrial fibrillation, snores very badly.  Was diagnosed with JOANIE at one point in time and had a CPAP but had not had the machine in a couple years.  We completed a repeat sleep study and she returns today for follow-up. She got her CPAP and reports that she has been compliant. She reports that she is feeling better overall. She is sleeping better, no longer wakes with a headaches, and has increased energy during the daytime.    She has been established with Resource management for a couple of years and she was just informed that they no longer accept her insurance so she needs to find another provider. She has been on Xanax and Lexapro for about 10 years. This combination seems to work well for her. She takes the Xanax at nighttime only.     Social History:  reports that she has never smoked. She has never been exposed to tobacco smoke. She has never used smokeless tobacco. She reports that she does not drink alcohol and does not use drugs.    Past Medical History:  has a past medical history of Anxiety disorder, unspecified, Bilateral leg edema, Coronary artery disease, Depression, CANDELARIO (dyspnea on exertion), Heart murmur, Hypertension, Mitral murmur, Obesity, unspecified, Obstructive sleep apnea, Paroxysmal atrial fibrillation, Sleep apnea, and Urinary incontinence.    Current Medications:  Current Outpatient Medications   Medication Instructions    ALPRAZolam (XANAX) 1 MG tablet 1 tablet, Oral, Daily    carvediloL (COREG) 6.25 mg, Oral, 2 times  "daily    docusate sodium (COLACE) 100 mg, Oral, Daily    ELIQUIS 5 mg, Oral, 2 times daily    enalapril (VASOTEC) 20 MG tablet 1 tablet, Oral, Daily    EScitalopram oxalate (LEXAPRO) 20 mg, Oral, Daily    furosemide (LASIX) 40 mg, Oral, 2 times daily    naproxen (NAPROSYN) 500 mg, Oral, 2 times daily    nitroGLYCERIN (NITROSTAT) 0.4 mg, Sublingual, As needed (PRN)    promethazine (PHENERGAN) 25 mg, Oral, Every 6 hours PRN    ranolazine (RANEXA) 1,000 mg, Oral, 2 times daily, 1/2 tablet daily     rosuvastatin (CRESTOR) 5 mg, Oral    tiZANidine (ZANAFLEX) 4 mg, Oral, Every 6 hours PRN    triamcinolone acetonide 0.025% (KENALOG) 0.025 % cream Topical (Top), 2 times daily       Review of Systems   See HPI    Visit Vitals  /80 (BP Location: Left arm, Patient Position: Sitting, BP Method: Medium (Manual))   Pulse 77   Temp 97.2 °F (36.2 °C) (Temporal)   Ht 5' 5" (1.651 m)   Wt (!) 152.4 kg (336 lb)   SpO2 95%   BMI 55.91 kg/m²       Physical Exam  Vitals reviewed.   Constitutional:       Appearance: Normal appearance. She is obese.   Eyes:      Conjunctiva/sclera: Conjunctivae normal.   Cardiovascular:      Heart sounds: Normal heart sounds.   Pulmonary:      Breath sounds: Normal breath sounds.   Skin:     General: Skin is warm and dry.   Neurological:      Mental Status: She is oriented to person, place, and time.          Labs Reviewed:  Chemistry:  Lab Results   Component Value Date     05/13/2024    K 4.1 05/13/2024    BUN 19 05/13/2024    CREATININE 0.72 05/13/2024    EGFRNORACEVR >90 05/13/2024    GLUCOSE 147 (H) 05/13/2024    CALCIUM 9.8 05/13/2024    ALKPHOS 120 05/13/2024    LABPROT 8.4 (H) 05/13/2024    ALBUMIN 4.6 05/13/2024    AST 34 05/13/2024    ALT 27 05/13/2024    TSH 1.340 11/07/2023        Hematology:  Lab Results   Component Value Date    WBC 9.88 05/13/2024    RBC 4.76 05/13/2024    HGB 14.5 05/13/2024    HCT 44.2 05/13/2024    MCV 92.9 05/13/2024    MCH 30.5 05/13/2024    MCHC 32.8 " 05/13/2024    RDW 13.5 05/13/2024     05/13/2024    MPV 10.0 05/13/2024         Assessment & Plan:  1. Obstructive sleep apnea  Overview:  Dx w/ JOANIE in remote past; has not had machine in the last 2+ years.     Assessment & Plan:  Sleep study completed; Has CPAP and compliant with use.       2. Morbid obesity  Assessment & Plan:  Body mass index is 55.91 kg/m².  Goal BMI <30.  Exercise 5 times a week for 30 minutes per day.  Avoid soda, simple sugars, excessive rice, potatoes or bread. Limit fast foods and fried foods.  Choose complex carbs in moderation (example: green vegetables, beans, oatmeal). Eat plenty of fresh fruits and vegetables with lean meats daily.  Do not skip meals. Eat a balanced portion size.  Avoid fad diets. Consider permanent healthy life style changes.         3. Paroxysmal atrial fibrillation  Overview:  On Eliquis 5 mg BID and Coreg 6.25 BID.       4. Generalized anxiety disorder  Overview:  On Xanax 1 mg Qhs    Assessment & Plan:  Referral to [facility]varez    Orders:  -     Ambulatory referral/consult to Psychiatry; Future; Expected date: 06/18/2024    5. Recurrent major depressive disorder, in full remission  Overview:  On Lexapro 20 mg daily.            Future Appointments   Date Time Provider Department Center   11/11/2024  8:50 AM LAB, [facility] LABORATORY DRAW STATION [facility] RON Gao   11/18/2024  2:30 PM Janene Ford FNP-C [facility]       Follow up if symptoms worsen or fail to improve, for Keep appointment as scheduled. Call sooner if needed.    BERENICE Christine    Lab Frequency Next Occurrence   CBC Auto Differential Once 11/14/2024   Comprehensive Metabolic Panel Once 11/14/2024   Lipid Panel Once 11/14/2024   TSH Once 11/14/2024   Hemoglobin A1C Once 11/14/2024   Ambulatory referral/consult to Sleep Disorders Once 03/18/2024

## 2024-07-03 ENCOUNTER — TELEPHONE (OUTPATIENT)
Dept: FAMILY MEDICINE | Facility: CLINIC | Age: 64
End: 2024-07-03
Payer: MEDICARE

## 2024-07-03 DIAGNOSIS — Z12.31 BREAST CANCER SCREENING BY MAMMOGRAM: Primary | ICD-10-CM

## 2024-07-08 ENCOUNTER — TELEPHONE (OUTPATIENT)
Dept: FAMILY MEDICINE | Facility: CLINIC | Age: 64
End: 2024-07-08
Payer: MEDICARE

## 2024-07-08 DIAGNOSIS — Z78.0 ENCOUNTER FOR OSTEOPOROSIS SCREENING IN ASYMPTOMATIC POSTMENOPAUSAL PATIENT: ICD-10-CM

## 2024-07-08 DIAGNOSIS — Z12.11 SCREENING FOR COLON CANCER: Primary | ICD-10-CM

## 2024-07-08 DIAGNOSIS — Z13.820 ENCOUNTER FOR OSTEOPOROSIS SCREENING IN ASYMPTOMATIC POSTMENOPAUSAL PATIENT: ICD-10-CM

## 2024-07-08 NOTE — TELEPHONE ENCOUNTER
I saw that she was scheduled for 8/20 because she needs a cologuard. I called her and explained that we can place the order without her coming in. She said that she is scheduled for her mammogram and her pap smear but insurance asked her about bone density. She has never had one and is ok with going to [place].

## 2024-07-11 ENCOUNTER — HOSPITAL ENCOUNTER (OUTPATIENT)
Dept: RADIOLOGY | Facility: HOSPITAL | Age: 64
Discharge: HOME | End: 2024-07-11
Attending: NURSE PRACTITIONER
Payer: MEDICARE

## 2024-07-11 DIAGNOSIS — Z12.31 BREAST CANCER SCREENING BY MAMMOGRAM: ICD-10-CM

## 2024-07-11 PROCEDURE — 77067 SCR MAMMO BI INCL CAD: CPT | Mod: TC

## 2024-07-18 ENCOUNTER — OFFICE VISIT (OUTPATIENT)
Dept: BEHAVIORAL HEALTH | Facility: CLINIC | Age: 64
End: 2024-07-18
Payer: MEDICARE

## 2024-07-18 VITALS
HEIGHT: 65 IN | HEART RATE: 79 BPM | OXYGEN SATURATION: 95 % | TEMPERATURE: 96 F | WEIGHT: 293 LBS | DIASTOLIC BLOOD PRESSURE: 80 MMHG | SYSTOLIC BLOOD PRESSURE: 142 MMHG | BODY MASS INDEX: 48.82 KG/M2

## 2024-07-18 DIAGNOSIS — F41.1 GENERALIZED ANXIETY DISORDER: ICD-10-CM

## 2024-07-18 DIAGNOSIS — M25.59 PAIN IN OTHER SPECIFIED JOINT: ICD-10-CM

## 2024-07-18 DIAGNOSIS — F33.42 RECURRENT MAJOR DEPRESSIVE DISORDER, IN FULL REMISSION: Primary | ICD-10-CM

## 2024-07-18 LAB
AMP AMPHETAMINE 1000 NM/ML POC: NEGATIVE
BAR BARBITURATES 300 NG/ML POC: NEGATIVE
BUP BUPRENORPHINE 10 NG/ML POC: NEGATIVE
BZO BENZODIAZEPINES 300 NG/ML POC: ABNORMAL
COC COCAINE 300 NG/ML POC: NEGATIVE
CREATININE (CR) POC: ABNORMAL
CTP QC/QA: YES
MET METHAMPHETAMINE 1000 NG/ML POC: NEGATIVE
MOP/OPI300 MORPHINE 300 NG/ML POC: NEGATIVE
MTD METHADONE 300 NG/ML POC: NEGATIVE
OXIDANT (OX) POC: ABNORMAL
OXY OXYCODONE 100 NG/ML POC: NEGATIVE
SPECIFIC GRAVITY (SG) POC: ABNORMAL
TEMPERATURE (°F) POC: 98
THC MARIJUANA 50 NG/ML POC: NEGATIVE

## 2024-07-18 PROCEDURE — 1125F AMNT PAIN NOTED PAIN PRSNT: CPT | Mod: ,,, | Performed by: NURSE PRACTITIONER

## 2024-07-18 PROCEDURE — 4010F ACE/ARB THERAPY RXD/TAKEN: CPT | Mod: ,,, | Performed by: NURSE PRACTITIONER

## 2024-07-18 PROCEDURE — 1160F RVW MEDS BY RX/DR IN RCRD: CPT | Mod: ,,, | Performed by: NURSE PRACTITIONER

## 2024-07-18 PROCEDURE — 3008F BODY MASS INDEX DOCD: CPT | Mod: ,,, | Performed by: NURSE PRACTITIONER

## 2024-07-18 PROCEDURE — 3079F DIAST BP 80-89 MM HG: CPT | Mod: ,,, | Performed by: NURSE PRACTITIONER

## 2024-07-18 PROCEDURE — 1159F MED LIST DOCD IN RCRD: CPT | Mod: ,,, | Performed by: NURSE PRACTITIONER

## 2024-07-18 PROCEDURE — 3077F SYST BP >= 140 MM HG: CPT | Mod: ,,, | Performed by: NURSE PRACTITIONER

## 2024-07-18 PROCEDURE — 80305 DRUG TEST PRSMV DIR OPT OBS: CPT | Mod: QW,RHCUB | Performed by: NURSE PRACTITIONER

## 2024-07-18 PROCEDURE — 99214 OFFICE O/P EST MOD 30 MIN: CPT | Mod: ,,, | Performed by: NURSE PRACTITIONER

## 2024-07-18 RX ORDER — TRAZODONE HYDROCHLORIDE 50 MG/1
50 TABLET ORAL NIGHTLY PRN
Qty: 30 TABLET | Refills: 0 | Status: SHIPPED | OUTPATIENT
Start: 2024-07-18

## 2024-07-18 RX ORDER — ALPRAZOLAM 1 MG/1
1 TABLET ORAL DAILY
Qty: 30 TABLET | Refills: 0 | Status: SHIPPED | OUTPATIENT
Start: 2024-07-18

## 2024-07-18 RX ORDER — ESCITALOPRAM OXALATE 20 MG/1
20 TABLET ORAL DAILY
Qty: 30 TABLET | Refills: 0 | Status: SHIPPED | OUTPATIENT
Start: 2024-07-18

## 2024-07-18 RX ORDER — RANOLAZINE 500 MG/1
500 TABLET, EXTENDED RELEASE ORAL 2 TIMES DAILY
COMMUNITY
Start: 2024-07-08
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
## 2024-07-26 ENCOUNTER — HOSPITAL ENCOUNTER (OUTPATIENT)
Dept: RADIOLOGY | Facility: HOSPITAL | Age: 64
Discharge: HOME | End: 2024-07-26
Attending: NURSE PRACTITIONER
Payer: MEDICARE

## 2024-07-26 DIAGNOSIS — Z13.820 ENCOUNTER FOR OSTEOPOROSIS SCREENING IN ASYMPTOMATIC POSTMENOPAUSAL PATIENT: ICD-10-CM

## 2024-07-26 DIAGNOSIS — Z78.0 ENCOUNTER FOR OSTEOPOROSIS SCREENING IN ASYMPTOMATIC POSTMENOPAUSAL PATIENT: ICD-10-CM

## 2024-07-26 PROCEDURE — 77080 DXA BONE DENSITY AXIAL: CPT | Mod: TC

## 2024-08-01 ENCOUNTER — TELEPHONE (OUTPATIENT)
Dept: FAMILY MEDICINE | Facility: CLINIC | Age: 64
End: 2024-08-01
Payer: MEDICARE

## 2024-08-01 NOTE — PROGRESS NOTES
Chief Complaint:  Well Woman     History of Present Illness:  Ruchi Sam is a 64 y.o. year old  presents c/o urinary frequency and urge. States cannot make it to the bathroom at times. States her bed is a couple feet from her bathroom due to the urge.      Gyn History:  Menstrual History  Cycle: No  Menarche Age: 10 years  No Cycle Reason: Medical  Medical Reason: anovulatory    Menopause  Menopause Age: 32 years  Post Menopausal Bleeding: No  Hormone Replacement Therapy: No    Pap History  Last pap date: 23  Result: Normal  History of Abnormal Pap: No  HPV Vaccine Completed: No    [place]  Sexually Active: No  STI History: No  Contraception: Yes  Contraception Type: Tubal ligation/salpingectony    Breast History  Last Breast Imaging Date: Yes  Date: 24  History of Abnormal Breast Imaging : No  History of Breast Biopsy: No        Review of Systems:  General/Constitutional: Chills denies. Fatigue/weakness denies. Fever denies. Night sweats denies. Hot flashes denies    Respiratory: Cough denies. Hemoptysis denies. SOB denies. Sputum production denies. Wheezing denies .   Cardiovascular: Chest pain denies. Dizziness denies. Palpitations denies. Swelling in hands/feet denies.                Breast: Dimpling denies. Breast mass denies. Breast pain/tenderness denies. Nipple discharge denies. Puckering denies.  Gastrointestinal: Abdominal pain denies. Blood in stool denies. Constipation denies. Diarrhea denies. Heartburn denies. Nausea denies. Vomiting denies    Genitourinary: Incontinence denies. Blood in urine denies. Frequent urination denies. Painful urination denies. Urinary urgency denies. Nocturia denies    Gynecologic: Irregular menses denies. Heavy bleeding denies. Painful menses denies. Vaginal discharge denies. Vaginal odor denies. Vaginal itching denies. Vaginal lesion denies. Pelvic pain denies. Decreased libido denies. Vulvar lesion denies. Prolapse of genital organs denies.  Painful intercourse denies. Postcoital bleeding denies    Psychiatric: Depression denies. Anxiety denies.     OB History    Para Term  AB Living   4 3 3 0 1 3   SAB IAB Ectopic Multiple Live Births   0 0 0 0 3      # 1 - Date: 75, Sex: Female, Weight: None, GA: None, Type: Vaginal, Spontaneous, Apgar1: None, Apgar5: None, Living: Living, Birth Comments: None    # 2 - Date: , Sex: None, Weight: None, GA: 6w0d, Type: None, Apgar1: None, Apgar5: None, Living:  Demise, Birth Comments: None    # 3 - Date: 78, Sex: Female, Weight: None, GA: None, Type: Vaginal, Spontaneous, Apgar1: None, Apgar5: None, Living: Living, Birth Comments: None    # 4 - Date: 79, Sex: Male, Weight: None, GA: None, Type: Vaginal, Spontaneous, Apgar1: None, Apgar5: None, Living: Living, Birth Comments: None      Past Medical History:   Diagnosis Date    Anxiety disorder, unspecified     Bilateral leg edema     Coronary artery disease     Depression     CANDELARIO (dyspnea on exertion)     Heart murmur     Hypertension     Mitral murmur     Obesity, unspecified     Obstructive sleep apnea     Paroxysmal atrial fibrillation     Sleep apnea     Urinary incontinence        Current Outpatient Medications:     ALPRAZolam (XANAX) 1 MG tablet, Take 1 tablet (1 mg total) by mouth once daily., Disp: 30 tablet, Rfl: 0    carvediloL (COREG) 6.25 MG tablet, Take 6.25 mg by mouth 2 (two) times daily., Disp: , Rfl:     docusate sodium (COLACE) 100 MG capsule, Take 100 mg by mouth Daily., Disp: , Rfl:     ELIQUIS 5 mg Tab, Take 5 mg by mouth 2 (two) times daily., Disp: , Rfl:     enalapril (VASOTEC) 20 MG tablet, Take 1 tablet by mouth once daily., Disp: , Rfl:     EScitalopram oxalate (LEXAPRO) 20 MG tablet, Take 1 tablet (20 mg total) by mouth once daily., Disp: 30 tablet, Rfl: 0    furosemide (LASIX) 40 MG tablet, Take 40 mg by mouth 2 (two) times daily., Disp: , Rfl:     naproxen (NAPROSYN) 500 MG tablet, Take 1 tablet  (500 mg total) by mouth 2 (two) times daily. (Patient taking differently: Take 500 mg by mouth as needed.), Disp: 20 tablet, Rfl: 0    nitroGLYCERIN (NITROSTAT) 0.4 MG SL tablet, Place 0.4 mg under the tongue as needed., Disp: , Rfl:     ranolazine (RANEXA) 500 MG Tb12, Take 500 mg by mouth 2 (two) times daily., Disp: , Rfl:     rosuvastatin (CRESTOR) 5 MG tablet, Take 5 mg by mouth., Disp: , Rfl:     traZODone (DESYREL) 50 MG tablet, Take 1 tablet (50 mg total) by mouth nightly as needed for Insomnia., Disp: 30 tablet, Rfl: 0    Review of patient's allergies indicates:   Allergen Reactions    Hydrocodone Other (See Comments)     Increases blood pressure very high.    Cephalexin Nausea And Vomiting     Other reaction(s): unknown    Erythromycin Nausea And Vomiting     Other reaction(s): unknown    Metronidazole Nausea And Vomiting     Other reaction(s): Vomiting       Past Surgical History:   Procedure Laterality Date    ABDOMINAL SURGERY      Removed hernia mesh and replaced.    BILATERAL TUBAL LIGATION Bilateral     BOWEL RESECTION      CHOLECYSTECTOMY      CORONARY ANGIOGRAPHY Right 03/05/2024    Procedure: ANGIOGRAM, CORONARY ARTERY;  Surgeon: Norm Vásquez MD;  Location: [facility] CATH LAB;  Service: Cardiology;  Laterality: Right;    FISTULECTOMY      hemoroidectomy      HERNIA REPAIR      x2    LEFT HEART CATHETERIZATION Left 03/05/2024    Procedure: Left heart cath;  Surgeon: Norm Vásquez MD;  Location: [facility] CATH LAB;  Service: Cardiology;  Laterality: Left;    SHOULDER SURGERY      TONSILLECTOMY       Family History   Problem Relation Name Age of Onset    Heart attack Mother Joann flores     Heart failure Mother Joann flores     Hypertension Mother Joann flores     Seizures Mother Joann flores     Thyroid disease Mother Joann flores     Heart attack Sister Stefanie Silva     Diabetes Sister Stefanie Silva     Heart failure Sister Stefanie Silva     Hypertension Sister Stefanie Silva     Cervical cancer Sister  "Stefanie Silva     Breast cancer Neg Hx      Ovarian cancer Neg Hx      Uterine cancer Neg Hx      Colon cancer Neg Hx       Social History     Socioeconomic History    Marital status:     Number of children: 3   Tobacco Use    Smoking status: Never     Passive exposure: Never    Smokeless tobacco: Never   Substance and Sexual Activity    Alcohol use: Never    Drug use: Never    Sexual activity: Not Currently     Birth control/protection: See Surgical Hx     Social Determinants of Health     Financial Resource Strain: Low Risk  (3/10/2024)    Overall Financial Resource Strain (CARDIA)     Difficulty of Paying Living Expenses: Not hard at all   Food Insecurity: No Food Insecurity (3/10/2024)    Hunger Vital Sign     Worried About Running Out of Food in the Last Year: Never true     Ran Out of Food in the Last Year: Never true   Transportation Needs: No Transportation Needs (3/10/2024)    PRAPARE - Transportation     Lack of Transportation (Medical): No     Lack of Transportation (Non-Medical): No   Physical Activity: Insufficiently Active (3/10/2024)    Exercise Vital Sign     Days of Exercise per Week: 3 days     Minutes of Exercise per Session: 40 min   Stress: Stress Concern Present (3/10/2024)    [employer] - Occupational Stress Questionnaire     Feeling of Stress : To some extent   Housing Stability: Unknown (3/10/2024)    Housing Stability Vital Sign     Unable to Pay for Housing in the Last Year: No     Unstable Housing in the Last Year: No       Physical Exam:  /80   Ht 5' 5" (1.651 m)   Wt (!) 150.8 kg (332 lb 8 oz)   BMI 55.33 kg/m²     Chaperone: present.       General appearance: healthy, well-nourished and well-developed     Psychiatric: Orientation to time, place and person. Normal mood and affect and active, alert     Skin: Appearance: no rashes or lesions.     Neck:   Neck: supple, FROM, trachea midline. and no masses   Thyroid: no enlargement or nodules and " non-tender.       Cardiovascular:   Auscultation: RRR and no murmur.   Peripheral Vascular: no varicosities, LLE edema, RLE edema, calf tenderness, and palpable cord and pedal pulses intact.     Lungs:   Respiratory effort: no intercostal retractions or accessory muscle usage.   Auscultation: no wheezing, rales/crackles, or rhonchi and clear to auscultation.     Breast:   Inspection/Palpation: no tenderness, discrete/distinct masses, skin changes, or abnormal secretions. Nipple appearance normal.     Abdomen:   Auscultation/Inspection/Palpation: no hepatomegaly, splenomegaly, masses, tenderness or CVA tenderness and soft, non-distended bowel sounds preset.    Hernia: no palpable hernias.     Female Genitalia:    Vulva: no masses, tenderness or lesions    Bladder/Urethra: no urethral discharge or mass, normal meatus, bladder non-distended.    Vagina: no tenderness, erythema, cystocele, rectocele, abnormal vaginal discharge or vesicle(s) or ulcers    Cervix: no discharge, no cervical lacerations noted or motion tenderness and grossly normal    Uterus: normal size and shape and midline, non-tender, and no uterine prolapse.    Adnexa/Parametria: no parametrial tenderness or mass, no adnexal tenderness or ovarian mass.     Lymph Nodes:   Palpation: non tender submandibular nodes, axillary nodes, or inguinal nodes.     Rectal Exam:   Rectum: normal perianal skin.       Assessment/Plan:  1. Urinary urgency  2. Urinary tract infection without hematuria, site unspecified  Educated, UA  Discussed urinalysis results and patients symptoms  Culture sent to lab  Advised to to call if symptoms do not improve within 24 hrs or if she develops fever  Rx: Bactrim DS BID x3 days    Discussed history and findings as well as multiple etiologies for urine loss.     Explained that if symptoms are primarily associated with urgency we often empirically treat them with antispasmodics.     If this treatment fails or if her symptoms are usually  related to Valsalva then we recommend urodynamic studies to determine if surgery is her best option.     Also discussed that some patients may benefit from local estrogen.     If urge cont sp abx for UTI  call office will start Myrbetriq 25    3. BMI 50.0-59.9, adult  Weight loss with diet and exercise modifications           This note was transcribed by Kathleen Duncan MA. There may be transcription errors as a result, however minimal. I agree with transcription and every effort has been made to ensure accuracy of transcription, but any obvious errors or omissions should be clarified with the author of the document.

## 2024-08-01 NOTE — TELEPHONE ENCOUNTER
Pt will need an appt by the end of this month to discuss CPAP compliance. Insurance requires an appt no later than 91 days after beginning therapy.

## 2024-08-02 ENCOUNTER — OFFICE VISIT (OUTPATIENT)
Dept: OBSTETRICS AND GYNECOLOGY | Facility: CLINIC | Age: 64
End: 2024-08-02
Payer: MEDICARE

## 2024-08-02 VITALS
HEIGHT: 65 IN | DIASTOLIC BLOOD PRESSURE: 80 MMHG | SYSTOLIC BLOOD PRESSURE: 136 MMHG | WEIGHT: 293 LBS | BODY MASS INDEX: 48.82 KG/M2

## 2024-08-02 DIAGNOSIS — R39.15 URINARY URGENCY: Primary | ICD-10-CM

## 2024-08-02 DIAGNOSIS — N39.0 URINARY TRACT INFECTION WITHOUT HEMATURIA, SITE UNSPECIFIED: ICD-10-CM

## 2024-08-02 LAB
BILIRUB UR QL STRIP: POSITIVE
GLUCOSE UR QL STRIP: NEGATIVE
KETONES UR QL STRIP: NEGATIVE
LEUKOCYTE ESTERASE UR QL STRIP: POSITIVE
PH, POC UA: 5.5
POC BLOOD, URINE: NEGATIVE
POC NITRATES, URINE: POSITIVE
PROT UR QL STRIP: NEGATIVE
SP GR UR STRIP: >1.03 (ref 1–1.03)
UROBILINOGEN UR STRIP-ACNC: 1 (ref 0.1–1.1)

## 2024-08-02 RX ORDER — SULFAMETHOXAZOLE AND TRIMETHOPRIM 800; 160 MG/1; MG/1
1 TABLET ORAL 2 TIMES DAILY
Qty: 6 TABLET | Refills: 0 | Status: SHIPPED | OUTPATIENT
Start: 2024-08-02 | End: 2024-08-05

## 2024-08-06 ENCOUNTER — OFFICE VISIT (OUTPATIENT)
Dept: FAMILY MEDICINE | Facility: CLINIC | Age: 64
End: 2024-08-06
Payer: MEDICARE

## 2024-08-06 VITALS
TEMPERATURE: 97 F | DIASTOLIC BLOOD PRESSURE: 82 MMHG | HEIGHT: 65 IN | WEIGHT: 293 LBS | BODY MASS INDEX: 48.82 KG/M2 | OXYGEN SATURATION: 97 % | SYSTOLIC BLOOD PRESSURE: 118 MMHG | HEART RATE: 83 BPM

## 2024-08-06 DIAGNOSIS — M85.852 OSTEOPENIA OF BOTH HIPS: ICD-10-CM

## 2024-08-06 DIAGNOSIS — M85.851 OSTEOPENIA OF BOTH HIPS: ICD-10-CM

## 2024-08-06 DIAGNOSIS — H43.813 PVD (POSTERIOR VITREOUS DETACHMENT), BILATERAL: Primary | ICD-10-CM

## 2024-08-06 PROCEDURE — 99213 OFFICE O/P EST LOW 20 MIN: CPT | Mod: ,,, | Performed by: NURSE PRACTITIONER

## 2024-08-06 PROCEDURE — 3008F BODY MASS INDEX DOCD: CPT | Mod: ,,, | Performed by: NURSE PRACTITIONER

## 2024-08-06 PROCEDURE — 3079F DIAST BP 80-89 MM HG: CPT | Mod: ,,, | Performed by: NURSE PRACTITIONER

## 2024-08-06 PROCEDURE — 4010F ACE/ARB THERAPY RXD/TAKEN: CPT | Mod: ,,, | Performed by: NURSE PRACTITIONER

## 2024-08-06 PROCEDURE — 1159F MED LIST DOCD IN RCRD: CPT | Mod: ,,, | Performed by: NURSE PRACTITIONER

## 2024-08-06 PROCEDURE — 3074F SYST BP LT 130 MM HG: CPT | Mod: ,,, | Performed by: NURSE PRACTITIONER

## 2024-08-06 PROCEDURE — 1160F RVW MEDS BY RX/DR IN RCRD: CPT | Mod: ,,, | Performed by: NURSE PRACTITIONER

## 2024-08-18 ENCOUNTER — HOSPITAL ENCOUNTER (INPATIENT)
Facility: HOSPITAL | Age: 64
LOS: 3 days | Discharge: HOME | DRG: 389 | End: 2024-08-21
Attending: FAMILY MEDICINE | Admitting: INTERNAL MEDICINE
Payer: MEDICARE

## 2024-08-18 DIAGNOSIS — K56.609 SMALL BOWEL OBSTRUCTION: ICD-10-CM

## 2024-08-18 DIAGNOSIS — R06.02 SOB (SHORTNESS OF BREATH): ICD-10-CM

## 2024-08-18 LAB
ALBUMIN SERPL-MCNC: 4.4 G/DL (ref 3.4–5)
ALBUMIN/GLOB SERPL: 1.2 RATIO
ALP SERPL-CCNC: 108 UNIT/L (ref 50–144)
ALT SERPL-CCNC: 24 UNIT/L (ref 1–45)
ANION GAP SERPL CALC-SCNC: 6 MEQ/L (ref 2–13)
AST SERPL-CCNC: 29 UNIT/L (ref 14–36)
BASOPHILS # BLD AUTO: 0.03 X10(3)/MCL (ref 0.01–0.08)
BASOPHILS NFR BLD AUTO: 0.3 % (ref 0.1–1.2)
BILIRUB SERPL-MCNC: 0.6 MG/DL (ref 0–1)
BUN SERPL-MCNC: 16 MG/DL (ref 7–20)
CALCIUM SERPL-MCNC: 10.2 MG/DL (ref 8.4–10.2)
CHLORIDE SERPL-SCNC: 109 MMOL/L (ref 98–110)
CO2 SERPL-SCNC: 24 MMOL/L (ref 21–32)
CREAT SERPL-MCNC: 0.64 MG/DL (ref 0.66–1.25)
CREAT/UREA NIT SERPL: 25 (ref 12–20)
EOSINOPHIL # BLD AUTO: 0.01 X10(3)/MCL (ref 0.04–0.36)
EOSINOPHIL NFR BLD AUTO: 0.1 % (ref 0.7–7)
ERYTHROCYTE [DISTWIDTH] IN BLOOD BY AUTOMATED COUNT: 13.5 % (ref 11–14.5)
GFR SERPLBLD CREATININE-BSD FMLA CKD-EPI: >90 ML/MIN/1.73/M2
GLOBULIN SER-MCNC: 3.7 GM/DL (ref 2–3.9)
GLUCOSE SERPL-MCNC: 127 MG/DL (ref 70–115)
HCT VFR BLD AUTO: 39.9 % (ref 36–48)
HGB BLD-MCNC: 13.6 G/DL (ref 11.8–16)
IMM GRANULOCYTES # BLD AUTO: 0.04 X10(3)/MCL (ref 0–0.03)
IMM GRANULOCYTES NFR BLD AUTO: 0.4 % (ref 0–0.5)
LIPASE SERPL-CCNC: 91 U/L (ref 23–300)
LYMPHOCYTES # BLD AUTO: 3.09 X10(3)/MCL (ref 1.16–3.74)
LYMPHOCYTES NFR BLD AUTO: 30.7 % (ref 20–55)
MCH RBC QN AUTO: 30.6 PG (ref 27–34)
MCHC RBC AUTO-ENTMCNC: 34.1 G/DL (ref 31–37)
MCV RBC AUTO: 89.9 FL (ref 79–99)
MONOCYTES # BLD AUTO: 0.53 X10(3)/MCL (ref 0.24–0.36)
MONOCYTES NFR BLD AUTO: 5.3 % (ref 4.7–12.5)
NEUTROPHILS # BLD AUTO: 6.37 X10(3)/MCL (ref 1.56–6.13)
NEUTROPHILS NFR BLD AUTO: 63.2 % (ref 37–73)
NRBC BLD AUTO-RTO: 0 %
PLATELET # BLD AUTO: 209 X10(3)/MCL (ref 140–371)
PMV BLD AUTO: 10 FL (ref 9.4–12.4)
POTASSIUM SERPL-SCNC: 4.1 MMOL/L (ref 3.5–5.1)
PROT SERPL-MCNC: 8.1 GM/DL (ref 6.3–8.2)
RBC # BLD AUTO: 4.44 X10(6)/MCL (ref 4–5.1)
SODIUM SERPL-SCNC: 139 MMOL/L (ref 136–145)
WBC # BLD AUTO: 10.07 X10(3)/MCL (ref 4–11.5)

## 2024-08-18 PROCEDURE — 85025 COMPLETE CBC W/AUTO DIFF WBC: CPT | Performed by: FAMILY MEDICINE

## 2024-08-18 PROCEDURE — 25000003 PHARM REV CODE 250: Performed by: FAMILY MEDICINE

## 2024-08-18 PROCEDURE — 21400001 HC TELEMETRY ROOM

## 2024-08-18 PROCEDURE — 99285 EMERGENCY DEPT VISIT HI MDM: CPT | Mod: 25

## 2024-08-18 PROCEDURE — 96374 THER/PROPH/DIAG INJ IV PUSH: CPT

## 2024-08-18 PROCEDURE — 80053 COMPREHEN METABOLIC PANEL: CPT | Performed by: FAMILY MEDICINE

## 2024-08-18 PROCEDURE — 96375 TX/PRO/DX INJ NEW DRUG ADDON: CPT

## 2024-08-18 PROCEDURE — 11000001 HC ACUTE MED/SURG PRIVATE ROOM

## 2024-08-18 PROCEDURE — 63600175 PHARM REV CODE 636 W HCPCS: Performed by: FAMILY MEDICINE

## 2024-08-18 PROCEDURE — 96361 HYDRATE IV INFUSION ADD-ON: CPT

## 2024-08-18 PROCEDURE — 83690 ASSAY OF LIPASE: CPT | Performed by: FAMILY MEDICINE

## 2024-08-18 PROCEDURE — 25500020 PHARM REV CODE 255: Performed by: FAMILY MEDICINE

## 2024-08-18 RX ORDER — METOCLOPRAMIDE HYDROCHLORIDE 5 MG/ML
10 INJECTION INTRAMUSCULAR; INTRAVENOUS
Status: COMPLETED | OUTPATIENT
Start: 2024-08-18 | End: 2024-08-18

## 2024-08-18 RX ORDER — HYDRALAZINE HYDROCHLORIDE 20 MG/ML
10 INJECTION INTRAMUSCULAR; INTRAVENOUS EVERY 6 HOURS PRN
Status: DISCONTINUED | OUTPATIENT
Start: 2024-08-18 | End: 2024-08-21 | Stop reason: HOSPADM

## 2024-08-18 RX ORDER — SODIUM CHLORIDE 9 MG/ML
INJECTION, SOLUTION INTRAVENOUS CONTINUOUS
Status: DISCONTINUED | OUTPATIENT
Start: 2024-08-19 | End: 2024-08-21 | Stop reason: HOSPADM

## 2024-08-18 RX ORDER — MORPHINE SULFATE 4 MG/ML
4 INJECTION, SOLUTION INTRAMUSCULAR; INTRAVENOUS EVERY 4 HOURS PRN
Status: DISCONTINUED | OUTPATIENT
Start: 2024-08-19 | End: 2024-08-21 | Stop reason: HOSPADM

## 2024-08-18 RX ORDER — ENOXAPARIN SODIUM 150 MG/ML
1 INJECTION SUBCUTANEOUS EVERY 12 HOURS
Status: DISCONTINUED | OUTPATIENT
Start: 2024-08-19 | End: 2024-08-21 | Stop reason: HOSPADM

## 2024-08-18 RX ORDER — LORAZEPAM 2 MG/ML
0.5 INJECTION INTRAMUSCULAR EVERY 4 HOURS PRN
Status: DISCONTINUED | OUTPATIENT
Start: 2024-08-19 | End: 2024-08-21 | Stop reason: HOSPADM

## 2024-08-18 RX ORDER — CIPROFLOXACIN 2 MG/ML
400 INJECTION, SOLUTION INTRAVENOUS
Status: COMPLETED | OUTPATIENT
Start: 2024-08-18 | End: 2024-08-18

## 2024-08-18 RX ORDER — METRONIDAZOLE 500 MG/100ML
500 INJECTION, SOLUTION INTRAVENOUS
Status: DISCONTINUED | OUTPATIENT
Start: 2024-08-19 | End: 2024-08-18

## 2024-08-18 RX ORDER — ONDANSETRON HYDROCHLORIDE 2 MG/ML
4 INJECTION, SOLUTION INTRAVENOUS EVERY 6 HOURS PRN
Status: DISCONTINUED | OUTPATIENT
Start: 2024-08-19 | End: 2024-08-21 | Stop reason: HOSPADM

## 2024-08-18 RX ORDER — ONDANSETRON HYDROCHLORIDE 2 MG/ML
8 INJECTION, SOLUTION INTRAVENOUS
Status: COMPLETED | OUTPATIENT
Start: 2024-08-18 | End: 2024-08-18

## 2024-08-18 RX ORDER — KETOROLAC TROMETHAMINE 30 MG/ML
30 INJECTION, SOLUTION INTRAMUSCULAR; INTRAVENOUS
Status: COMPLETED | OUTPATIENT
Start: 2024-08-18 | End: 2024-08-18

## 2024-08-18 RX ORDER — SODIUM CHLORIDE 9 MG/ML
1000 INJECTION, SOLUTION INTRAVENOUS
Status: COMPLETED | OUTPATIENT
Start: 2024-08-18 | End: 2024-08-18

## 2024-08-18 RX ORDER — CIPROFLOXACIN 2 MG/ML
400 INJECTION, SOLUTION INTRAVENOUS
Status: DISCONTINUED | OUTPATIENT
Start: 2024-08-19 | End: 2024-08-21 | Stop reason: HOSPADM

## 2024-08-18 RX ADMIN — KETOROLAC TROMETHAMINE 30 MG: 30 INJECTION, SOLUTION INTRAMUSCULAR at 05:08

## 2024-08-18 RX ADMIN — IOHEXOL 100 ML: 300 INJECTION, SOLUTION INTRAVENOUS at 05:08

## 2024-08-18 RX ADMIN — CIPROFLOXACIN 400 MG: 2 INJECTION, SOLUTION INTRAVENOUS at 10:08

## 2024-08-18 RX ADMIN — SODIUM CHLORIDE 1000 ML: 9 INJECTION, SOLUTION INTRAVENOUS at 04:08

## 2024-08-18 RX ADMIN — ONDANSETRON 8 MG: 2 INJECTION INTRAMUSCULAR; INTRAVENOUS at 04:08

## 2024-08-18 RX ADMIN — SODIUM CHLORIDE 1000 ML: 9 INJECTION, SOLUTION INTRAVENOUS at 10:08

## 2024-08-18 RX ADMIN — METOCLOPRAMIDE 10 MG: 5 INJECTION, SOLUTION INTRAMUSCULAR; INTRAVENOUS at 05:08

## 2024-08-18 RX ADMIN — MORPHINE SULFATE 4 MG: 4 INJECTION, SOLUTION INTRAMUSCULAR; INTRAVENOUS at 11:08

## 2024-08-18 NOTE — ED PROVIDER NOTES
"Encounter Date: 8/18/2024       History     Chief Complaint   Patient presents with    Vomiting    Diarrhea    Abdominal Pain     Intermittent abd pain, vomiting and diarrhea onset last night, unable to keep anything down.  Pt also reports feeling weak and "drained."     Nausea vomiting and diarrhea since last night    The history is provided by the patient.   Emesis   This is a new problem. Associated symptoms include abdominal pain and diarrhea.   Diarrhea   Associated symptoms include abdominal pain and vomiting.   Abdominal Pain  The current episode started yesterday. The problem has not changed since onset.The abdominal pain is located in the LLQ. The abdominal pain does not radiate. The other symptoms of the illness include vomiting and diarrhea.     Review of patient's allergies indicates:   Allergen Reactions    Hydrocodone Other (See Comments)     Increases blood pressure very high.    Cephalexin Nausea And Vomiting     Other reaction(s): unknown    Erythromycin Nausea And Vomiting     Other reaction(s): unknown    Metronidazole Nausea And Vomiting     Other reaction(s): Vomiting     Past Medical History:   Diagnosis Date    Anxiety disorder, unspecified     Bilateral leg edema     Coronary artery disease     Depression     CANDELARIO (dyspnea on exertion)     Heart murmur     Hypertension     Mitral murmur     Obesity, unspecified     Obstructive sleep apnea     Paroxysmal atrial fibrillation     Sleep apnea     Urinary incontinence      Past Surgical History:   Procedure Laterality Date    ABDOMINAL SURGERY      Removed hernia mesh and replaced.    BILATERAL TUBAL LIGATION Bilateral     BOWEL RESECTION      CHOLECYSTECTOMY      CORONARY ANGIOGRAPHY Right 03/05/2024    Procedure: ANGIOGRAM, CORONARY ARTERY;  Surgeon: Norm Vásquez MD;  Location: [facility] CATH LAB;  Service: Cardiology;  Laterality: Right;    FISTULECTOMY      hemoroidectomy      HERNIA REPAIR      x2    LEFT HEART CATHETERIZATION Left 03/05/2024    " Procedure: Left heart cath;  Surgeon: Norm Vásquez MD;  Location: [facility] CATH LAB;  Service: Cardiology;  Laterality: Left;    SHOULDER SURGERY      TONSILLECTOMY       Family History   Problem Relation Name Age of Onset    Heart attack Mother Joann flores     Heart failure Mother Joann flores     Hypertension Mother Joann flores     Seizures Mother Joann flores     Thyroid disease Mother Joann flores     Heart attack Sister Stefanie Silva     Diabetes Sister Stefanie Silva     Heart failure Sister Stefanie Silva     Hypertension Sister Stefanie Silva     Cervical cancer Sister Stefanie Silva     Breast cancer Neg Hx      Ovarian cancer Neg Hx      Uterine cancer Neg Hx      Colon cancer Neg Hx       Social History     Tobacco Use    Smoking status: Never     Passive exposure: Never    Smokeless tobacco: Never   Substance Use Topics    Alcohol use: Never    Drug use: Never     Review of Systems   Gastrointestinal:  Positive for abdominal pain, diarrhea and vomiting.   All other systems reviewed and are negative.      Physical Exam     Initial Vitals [08/18/24 1540]   BP Pulse Resp Temp SpO2   (!) 124/91 94 (!) 24 98.6 °F (37 °C) 97 %      MAP       --         Physical Exam    Nursing note and vitals reviewed.  Constitutional: Vital signs are normal. She appears well-nourished. She is not diaphoretic. She appears distressed (Uncomfortable appearing).   HENT:   Head: Normocephalic and atraumatic.   Nose: Nose normal.   Mouth/Throat: Oropharynx is clear and moist.   Eyes: Conjunctivae and EOM are normal. Pupils are equal, round, and reactive to light.   Neck: Neck supple. No tracheal deviation present.   Normal range of motion.  Cardiovascular:  Normal rate, intact distal pulses and normal pulses.     Exam reveals no decreased pulses.       Pulmonary/Chest: Effort normal. No respiratory distress.   Abdominal: Abdomen is soft. She exhibits no distension. There is abdominal tenderness in the left lower quadrant.    Musculoskeletal:         General: No tenderness. Normal range of motion.      Cervical back: Normal range of motion and neck supple.      Comments: No acute change     Neurological: She is alert and oriented to person, place, and time. GCS score is 15. GCS eye subscore is 4. GCS verbal subscore is 5. GCS motor subscore is 6.   No acute change   Skin: Skin is warm and dry. No rash noted.   Psychiatric: She has a normal mood and affect. Thought content normal.         ED Course   Procedures  Labs Reviewed   COMPREHENSIVE METABOLIC PANEL - Abnormal       Result Value    Sodium 139      Potassium 4.1      Chloride 109      CO2 24      Glucose 127 (*)     Blood Urea Nitrogen 16      Creatinine 0.64 (*)     Calcium 10.2      Protein Total 8.1      Albumin 4.4      Globulin 3.7      Albumin/Globulin Ratio 1.2      Bilirubin Total 0.6            ALT 24      AST 29      eGFR >90      Anion Gap 6.0      BUN/Creatinine Ratio 25 (*)    CBC WITH DIFFERENTIAL - Abnormal    WBC 10.07      RBC 4.44      Hgb 13.6      Hct 39.9      MCV 89.9      MCH 30.6      MCHC 34.1      RDW 13.5      Platelet 209      MPV 10.0      Neut % 63.2      Lymph % 30.7      Mono % 5.3      Eos % 0.1 (*)     Basophil % 0.3      Lymph # 3.09      Neut # 6.37 (*)     Mono # 0.53 (*)     Eos # 0.01 (*)     Baso # 0.03      IG# 0.04 (*)     IG% 0.4      NRBC% 0.0     LIPASE - Normal    Lipase Level 91     CBC W/ AUTO DIFFERENTIAL    Narrative:     The following orders were created for panel order CBC Auto Differential.  Procedure                               Abnormality         Status                     ---------                               -----------         ------                     CBC with Differential[[id number]]       Abnormal            Final result                 Please view results for these tests on the individual orders.   URINALYSIS          Imaging Results              CT Abdomen Pelvis With IV Contrast NO Oral Contrast (Final  result)  Result time 08/18/24 18:02:28      Final result by Marco Jain MD (08/18/24 18:02:28)                   Impression:      Changes most consistent with an early or partial small bowel obstruction.      Electronically signed by: Marco Jain MD  Date:    08/18/2024  Time:    18:02               Narrative:    EXAMINATION:  CT ABDOMEN PELVIS WITH IV CONTRAST    CLINICAL HISTORY:  LLQ abdominal pain;    TECHNIQUE:  Low dose axial images, sagittal and coronal reformations were obtained from the lung bases to the pubic symphysis following the IV administration of 100 mL of Omnipaque 350 no oral contrast was given.    Automatic exposure control (AEC) was utilized for dose reduction.    Dose: 1753.7 mGycm    COMPARISON:  05/13/2024    FINDINGS:  Lung bases appear clear.  Liver appears normal.  Spleen appears normal.  Pancreas appears normal.  Patient has changes consistent with previous cholecystectomy.  The adrenals are not enlarged.  Kidneys appear normal.  Patient has had a previous ventral hernia repair.  There are diverticulum in the colon without evidence of acute diverticulitis the appendix appears normal.  There are dilated loops of small bowel with normal caliber distal small bowel loops and I cannot rule out an early or partial small bowel obstruction.  The transition point appears to be at series 3, image 55.                                       Medications   0.9%  NaCl infusion (has no administration in time range)   sodium chloride 0.9% bolus 1,000 mL 1,000 mL (0 mLs Intravenous Stopped 8/18/24 1712)   ondansetron injection 8 mg (8 mg Intravenous Given 8/18/24 1612)   metoclopramide injection 10 mg (10 mg Intravenous Given 8/18/24 1716)   ketorolac injection 30 mg (30 mg Intravenous Given 8/18/24 1717)   iohexoL (OMNIPAQUE 300) injection 100 mL (100 mLs Intravenous Given 8/18/24 1740)     Medical Decision Making  Amount and/or Complexity of Data Reviewed  Labs: ordered.  Radiology:  ordered.    Risk  Prescription drug management.  Decision regarding hospitalization.      Additional MDM:   Differential Diagnosis:   Symptom: Abdominal pain. <> The follow diagnoses were considered and will be evaluated: Alcoholic Gastritis, Bowel Obstruction, Diverticulitis, Gastroenteritis, Gastroparesis, Pancreatitis, Pyelonephritis, Renal Stone, Small Bowel Obstruction, Ureteral Calculus and Urinary Tract Infection.                               Discussed with surgery patient has partial small bowel obstruction. Dr Ledbetter      Clinical Impression:  Final diagnoses:  [K56.609] Small bowel obstruction          ED Disposition Condition    Admit Stable                Esteban Kumar MD  08/18/24 0671

## 2024-08-18 NOTE — ED NOTES
Ruchi Sam  : 1960  MRN: [id number]  ConnectID: [id number]  REASON:  ED Admit Discussion  ACUITY:  30 Minutes  SUBMITTED:  2024 18:23 CDT

## 2024-08-19 PROBLEM — N39.0 UTI (URINARY TRACT INFECTION): Status: ACTIVE | Noted: 2024-08-19

## 2024-08-19 LAB
ADV 40+41 DNA STL QL NAA+NON-PROBE: NOT DETECTED
ANION GAP SERPL CALC-SCNC: 4 MEQ/L (ref 2–13)
ASCENDING AORTA: 4.05 CM
ASTRO TYP 1-8 RNA STL QL NAA+NON-PROBE: NOT DETECTED
AV INDEX (PROSTH): 0.89
AV MEAN GRADIENT: 9 MMHG
AV PEAK GRADIENT: 12 MMHG
AV VALVE AREA BY VELOCITY RATIO: 3.01 CM²
AV VALVE AREA: 3.47 CM²
AV VELOCITY RATIO: 0.77
BACTERIA #/AREA URNS AUTO: ABNORMAL /HPF
BASOPHILS # BLD AUTO: 0.02 X10(3)/MCL (ref 0.01–0.08)
BASOPHILS NFR BLD AUTO: 0.3 % (ref 0.1–1.2)
BILIRUB UR QL STRIP.AUTO: ABNORMAL
BSA FOR ECHO PROCEDURE: 2.61 M2
BUN SERPL-MCNC: 20 MG/DL (ref 7–20)
C CAYETANENSIS DNA STL QL NAA+NON-PROBE: NOT DETECTED
C COLI+JEJ+UPSA DNA STL QL NAA+NON-PROBE: NOT DETECTED
CALCIUM SERPL-MCNC: 9.1 MG/DL (ref 8.4–10.2)
CHLORIDE SERPL-SCNC: 111 MMOL/L (ref 98–110)
CLARITY UR: CLEAR
CO2 SERPL-SCNC: 24 MMOL/L (ref 21–32)
COLOR STL: ABNORMAL
COLOR UR AUTO: ABNORMAL
CONSISTENCY STL: ABNORMAL
CONSISTENCY STL: NORMAL
CREAT SERPL-MCNC: 0.69 MG/DL (ref 0.66–1.25)
CREAT/UREA NIT SERPL: 29 (ref 12–20)
CRYPTOSP DNA STL QL NAA+NON-PROBE: NOT DETECTED
CV ECHO LV RWT: 0.58 CM
DOP CALC AO PEAK VEL: 1.71 M/S
DOP CALC AO VTI: 37.8 CM
DOP CALC LVOT AREA: 3.9 CM2
DOP CALC LVOT DIAMETER: 2.23 CM
DOP CALC LVOT PEAK VEL: 1.32 M/S
DOP CALC LVOT STROKE VOLUME: 131.17 CM3
DOP CALC MV VTI: 27.1 CM
DOP CALCLVOT PEAK VEL VTI: 33.6 CM
E HISTOLYT DNA STL QL NAA+NON-PROBE: NOT DETECTED
E WAVE DECELERATION TIME: 177 MSEC
E/A RATIO: 1.32
E/E' RATIO: 11.39 M/S
EAEC PAA PLAS AGGR+AATA ST NAA+NON-PRB: NOT DETECTED
EC STX1+STX2 GENES STL QL NAA+NON-PROBE: NOT DETECTED
ECHO LV POSTERIOR WALL: 1.1 CM (ref 0.6–1.1)
EOSINOPHIL # BLD AUTO: 0.03 X10(3)/MCL (ref 0.04–0.36)
EOSINOPHIL NFR BLD AUTO: 0.4 % (ref 0.7–7)
EPEC EAE GENE STL QL NAA+NON-PROBE: NOT DETECTED
ERYTHROCYTE [DISTWIDTH] IN BLOOD BY AUTOMATED COUNT: 13.8 % (ref 11–14.5)
ETEC LTA+ST1A+ST1B TOX ST NAA+NON-PROBE: NOT DETECTED
FRACTIONAL SHORTENING: 30 % (ref 28–44)
G LAMBLIA DNA STL QL NAA+NON-PROBE: NOT DETECTED
GFR SERPLBLD CREATININE-BSD FMLA CKD-EPI: >90 ML/MIN/1.73/M2
GLUCOSE SERPL-MCNC: 108 MG/DL (ref 70–115)
GLUCOSE UR QL STRIP: NEGATIVE
HCT VFR BLD AUTO: 35.3 % (ref 36–48)
HEMOCCULT SP1 STL QL: POSITIVE
HEMOCCULT SP2 STL QL: POSITIVE
HEMOCCULT SP3 STL QL: POSITIVE
HGB BLD-MCNC: 11.8 G/DL (ref 11.8–16)
HGB UR QL STRIP: NEGATIVE
IMM GRANULOCYTES # BLD AUTO: 0.01 X10(3)/MCL (ref 0–0.03)
IMM GRANULOCYTES NFR BLD AUTO: 0.1 % (ref 0–0.5)
INTERVENTRICULAR SEPTUM: 1.01 CM (ref 0.6–1.1)
KETONES UR QL STRIP: NEGATIVE
LEFT ATRIUM SIZE: 3.49 CM
LEFT ATRIUM VOLUME INDEX MOD: 21 ML/M2
LEFT ATRIUM VOLUME MOD: 51 CM3
LEFT INTERNAL DIMENSION IN SYSTOLE: 2.63 CM (ref 2.1–4)
LEFT VENTRICLE DIASTOLIC VOLUME INDEX: 25.02 ML/M2
LEFT VENTRICLE DIASTOLIC VOLUME: 60.79 ML
LEFT VENTRICLE END SYSTOLIC VOLUME APICAL 2 CHAMBER: 46.3 ML
LEFT VENTRICLE END SYSTOLIC VOLUME APICAL 4 CHAMBER: 52 ML
LEFT VENTRICLE MASS INDEX: 52 G/M2
LEFT VENTRICLE SYSTOLIC VOLUME INDEX: 10.4 ML/M2
LEFT VENTRICLE SYSTOLIC VOLUME: 25.32 ML
LEFT VENTRICULAR INTERNAL DIMENSION IN DIASTOLE: 3.77 CM (ref 3.5–6)
LEFT VENTRICULAR MASS: 125.16 G
LEUKOCYTE ESTERASE UR QL STRIP: NEGATIVE
LV LATERAL E/E' RATIO: 10.92 M/S
LV SEPTAL E/E' RATIO: 11.91 M/S
LVED V (TEICH): 60.79 ML
LVES V (TEICH): 25.32 ML
LVOT MG: 3.4 MMHG
LVOT MV: 0.83 CM/S
LYMPHOCYTES # BLD AUTO: 2.35 X10(3)/MCL (ref 1.16–3.74)
LYMPHOCYTES NFR BLD AUTO: 30.1 % (ref 20–55)
MCH RBC QN AUTO: 30.8 PG (ref 27–34)
MCHC RBC AUTO-ENTMCNC: 33.4 G/DL (ref 31–37)
MCV RBC AUTO: 92.2 FL (ref 79–99)
MONOCYTES # BLD AUTO: 0.75 X10(3)/MCL (ref 0.24–0.36)
MONOCYTES NFR BLD AUTO: 9.6 % (ref 4.7–12.5)
MV MEAN GRADIENT: 3 MMHG
MV PEAK A VEL: 0.99 M/S
MV PEAK E VEL: 1.31 M/S
MV PEAK GRADIENT: 5 MMHG
MV VALVE AREA BY CONTINUITY EQUATION: 4.84 CM2
NEUTROPHILS # BLD AUTO: 4.64 X10(3)/MCL (ref 1.56–6.13)
NEUTROPHILS NFR BLD AUTO: 59.5 % (ref 37–73)
NITRITE UR QL STRIP: POSITIVE
NRBC BLD AUTO-RTO: 0 %
P SHIGELLOIDES DNA STL QL NAA+NON-PROBE: NOT DETECTED
PH UR STRIP: 5.5 [PH]
PLATELET # BLD AUTO: 185 X10(3)/MCL (ref 140–371)
PMV BLD AUTO: 10.3 FL (ref 9.4–12.4)
POTASSIUM SERPL-SCNC: 4 MMOL/L (ref 3.5–5.1)
PROT UR QL STRIP: ABNORMAL
RA PRESSURE ESTIMATED: 3 MMHG
RBC # BLD AUTO: 3.83 X10(6)/MCL (ref 4–5.1)
RBC #/AREA URNS AUTO: ABNORMAL /HPF
RVA RNA STL QL NAA+NON-PROBE: NOT DETECTED
S ENT+BONG DNA STL QL NAA+NON-PROBE: NOT DETECTED
SAPO I+II+IV+V RNA STL QL NAA+NON-PROBE: NOT DETECTED
SHIGELLA SP+EIEC IPAH ST NAA+NON-PROBE: NOT DETECTED
SODIUM SERPL-SCNC: 139 MMOL/L (ref 136–145)
SP GR UR STRIP.AUTO: >=1.03 (ref 1–1.03)
SQUAMOUS #/AREA URNS AUTO: ABNORMAL /HPF
TDI LATERAL: 0.12 M/S
TDI SEPTAL: 0.11 M/S
TDI: 0.12 M/S
TRICUSPID ANNULAR PLANE SYSTOLIC EXCURSION: 1.93 CM
UROBILINOGEN UR STRIP-ACNC: 1
V CHOL+PARA+VUL DNA STL QL NAA+NON-PROBE: NOT DETECTED
V CHOLERAE DNA STL QL NAA+NON-PROBE: NOT DETECTED
WBC # BLD AUTO: 7.8 X10(3)/MCL (ref 4–11.5)
WBC #/AREA URNS AUTO: ABNORMAL /HPF
Y ENTEROCOL DNA STL QL NAA+NON-PROBE: NOT DETECTED
Z-SCORE OF LEFT VENTRICULAR DIMENSION IN END DIASTOLE: -11.33
Z-SCORE OF LEFT VENTRICULAR DIMENSION IN END SYSTOLE: -7.73

## 2024-08-19 PROCEDURE — 94761 N-INVAS EAR/PLS OXIMETRY MLT: CPT

## 2024-08-19 PROCEDURE — 82272 OCCULT BLD FECES 1-3 TESTS: CPT | Performed by: SURGERY

## 2024-08-19 PROCEDURE — 25500020 PHARM REV CODE 255: Performed by: FAMILY MEDICINE

## 2024-08-19 PROCEDURE — 63600175 PHARM REV CODE 636 W HCPCS: Performed by: INTERNAL MEDICINE

## 2024-08-19 PROCEDURE — 81015 MICROSCOPIC EXAM OF URINE: CPT | Performed by: FAMILY MEDICINE

## 2024-08-19 PROCEDURE — 85025 COMPLETE CBC W/AUTO DIFF WBC: CPT | Performed by: INTERNAL MEDICINE

## 2024-08-19 PROCEDURE — A9698 NON-RAD CONTRAST MATERIALNOC: HCPCS | Performed by: FAMILY MEDICINE

## 2024-08-19 PROCEDURE — 87507 IADNA-DNA/RNA PROBE TQ 12-25: CPT | Performed by: SURGERY

## 2024-08-19 PROCEDURE — 27000221 HC OXYGEN, UP TO 24 HOURS

## 2024-08-19 PROCEDURE — 21400001 HC TELEMETRY ROOM

## 2024-08-19 PROCEDURE — 80048 BASIC METABOLIC PNL TOTAL CA: CPT | Performed by: INTERNAL MEDICINE

## 2024-08-19 PROCEDURE — 25000003 PHARM REV CODE 250: Performed by: FAMILY MEDICINE

## 2024-08-19 PROCEDURE — 25000003 PHARM REV CODE 250: Performed by: INTERNAL MEDICINE

## 2024-08-19 PROCEDURE — 63600175 PHARM REV CODE 636 W HCPCS: Performed by: FAMILY MEDICINE

## 2024-08-19 PROCEDURE — 81003 URINALYSIS AUTO W/O SCOPE: CPT | Performed by: FAMILY MEDICINE

## 2024-08-19 PROCEDURE — 87086 URINE CULTURE/COLONY COUNT: CPT | Performed by: FAMILY MEDICINE

## 2024-08-19 PROCEDURE — 36415 COLL VENOUS BLD VENIPUNCTURE: CPT | Performed by: INTERNAL MEDICINE

## 2024-08-19 RX ADMIN — PIPERACILLIN AND TAZOBACTAM 4.5 G: 4; .5 INJECTION, POWDER, FOR SOLUTION INTRAVENOUS; PARENTERAL at 06:08

## 2024-08-19 RX ADMIN — SODIUM CHLORIDE: 9 INJECTION, SOLUTION INTRAVENOUS at 06:08

## 2024-08-19 RX ADMIN — CIPROFLOXACIN 400 MG: 2 INJECTION, SOLUTION INTRAVENOUS at 10:08

## 2024-08-19 RX ADMIN — ENOXAPARIN SODIUM 150 MG: 150 INJECTION SUBCUTANEOUS at 08:08

## 2024-08-19 RX ADMIN — PIPERACILLIN AND TAZOBACTAM 4.5 G: 4; .5 INJECTION, POWDER, FOR SOLUTION INTRAVENOUS; PARENTERAL at 12:08

## 2024-08-19 RX ADMIN — SODIUM CHLORIDE: 9 INJECTION, SOLUTION INTRAVENOUS at 03:08

## 2024-08-19 RX ADMIN — LORAZEPAM 0.5 MG: 2 INJECTION, SOLUTION INTRAMUSCULAR; INTRAVENOUS at 08:08

## 2024-08-19 RX ADMIN — IOHEXOL 1000 ML: 9 SOLUTION ORAL at 12:08

## 2024-08-19 NOTE — HPI
"Vomiting     Diarrhea    Abdominal Pain       Intermittent abd pain, vomiting and diarrhea onset last night, unable to keep anything down.  Pt also reports feeling weak and "drained."         HPI: The patient is a 64-year-old female with a previous history of small bowel obstructions on two previous episodes. She has atrial fibrillation, hypertension and history of multiple hernia surgeries with mesh placement in 2008 and 2017. She presents with abdominal pain which began last night after eating at her daughters house. Pain is diffuse and there is generalized pain. She had nausea and vomiting, which was persistent up until she presented to the emergency department. CT scan of the abdomen pelvis shows early small bowel obstruction. Surgery is consulted for evaluation. Patient is admitted for acute management of the above.  "

## 2024-08-19 NOTE — PROGRESS NOTES
"[facility]/Surg  [facility] Medicine  Progress Note    Patient Name: Ruchi Sam  MRN: [id number]  Patient Class: IP- Inpatient   Admission Date: 8/18/2024  Length of Stay: 1 days  Attending Physician: Susan George MD  Primary Care Provider: Janene Ford FNP-C        Subjective:     Principal Problem:SBO (small bowel obstruction)        HPI:  Vomiting     Diarrhea    Abdominal Pain       Intermittent abd pain, vomiting and diarrhea onset last night, unable to keep anything down.  Pt also reports feeling weak and "drained."         HPI: The patient is a 64-year-old female with a previous history of small bowel obstructions on two previous episodes. She has atrial fibrillation, hypertension and history of multiple hernia surgeries with mesh placement in 2008 and 2017. She presents with abdominal pain which began last night after eating at her daughters house. Pain is diffuse and there is generalized pain. She had nausea and vomiting, which was persistent up until she presented to the emergency department. CT scan of the abdomen pelvis shows early small bowel obstruction. Surgery is consulted for evaluation. Patient is admitted for acute management of the above.    Overview/Hospital Course:  08/19/2024 pt presented with 1-2 d h/o abdominal pain and nausea, thought to have SBO.  CT done in ER shows early SBO.  Dr. Feliz consulted and ordered repeat CT with po contrast.    Interval History:      Review of Systems   Constitutional:  Positive for activity change and appetite change. Negative for fever.   Respiratory:  Negative for chest tightness, shortness of breath and wheezing.    Cardiovascular:  Negative for chest pain.   Gastrointestinal:  Positive for abdominal pain (left sided middle), nausea and vomiting. Negative for constipation and diarrhea.   Genitourinary:  Negative for dysuria.   Skin:  Negative for rash and wound.   Neurological:  Negative for tremors and headaches. "     Objective:     Vital Signs (Most Recent):  Temp: 98 °F (36.7 °C) (08/19/24 0701)  Pulse: 67 (08/19/24 0701)  Resp: 16 (08/19/24 0701)  BP: 107/62 (08/19/24 0701)  SpO2: 96 % (08/19/24 0821) Vital Signs (24h Range):  Temp:  [98 °F (36.7 °C)-98.9 °F (37.2 °C)] 98 °F (36.7 °C)  Pulse:  [67-96] 67  Resp:  [16-24] 16  SpO2:  [95 %-98 %] 96 %  BP: (103-150)/(62-91) 107/62     Weight: (!) 151.2 kg (333 lb 4.8 oz)  Body mass index is 57.21 kg/m².    Intake/Output Summary (Last 24 hours) at 8/19/2024 1148  Last data filed at 8/19/2024 0539  Gross per 24 hour   Intake 1154.17 ml   Output 250 ml   Net 904.17 ml         Physical Exam  Vitals and nursing note reviewed. Exam conducted with a chaperone present.   Constitutional:       General: She is not in acute distress.     Appearance: Normal appearance. She is obese. She is not ill-appearing.   HENT:      Head: Normocephalic and atraumatic.   Eyes:      General: No scleral icterus.     Extraocular Movements: Extraocular movements intact.   Cardiovascular:      Rate and Rhythm: Normal rate and regular rhythm.      Pulses: Normal pulses.      Heart sounds: Normal heart sounds.   Pulmonary:      Effort: Pulmonary effort is normal.      Breath sounds: Normal breath sounds.   Abdominal:      General: Abdomen is flat. Bowel sounds are normal. There is distension.      Palpations: Abdomen is soft. There is no mass.      Tenderness: There is abdominal tenderness (left mid).      Hernia: No hernia is present.      Comments:  Midline abdominal scar, no palpable hernia   Musculoskeletal:      Right lower leg: Edema present.      Left lower leg: Edema present.      Comments: Trace td   Skin:     General: Skin is warm and dry.      Capillary Refill: Capillary refill takes less than 2 seconds.      Findings: No erythema, lesion or rash.   Neurological:      General: No focal deficit present.      Mental Status: She is alert and oriented to person, place, and time.   Psychiatric:          Mood and Affect: Mood normal.         Behavior: Behavior normal.         Thought Content: Thought content normal.             Significant Labs: All pertinent labs within the past 24 hours have been reviewed.  CBC:   Recent Labs   Lab 08/18/24  1559 08/19/24  0418   WBC 10.07 7.80   HGB 13.6 11.8   HCT 39.9 35.3*    185     CMP:   Recent Labs   Lab 08/18/24  1559 08/19/24  0418    139   K 4.1 4.0    111*   CO2 24 24   BUN 16 20   CREATININE 0.64* 0.69   CALCIUM 10.2 9.1   ALBUMIN 4.4  --    BILITOT 0.6  --    ALKPHOS 108  --    AST 29  --    ALT 24  --        Significant Imaging: I have reviewed all pertinent imaging results/findings within the past 24 hours.    Assessment/Plan:      * SBO (small bowel obstruction)  H/o multiple surgeries  Surgery consulted  May be worsened due to UTI  Cover with iv abx cipro and zosyn  Repeat CT with oral contrast is pending, no vomiting since admit      UTI (urinary tract infection)  Continue zosyn  F/u on cx      Chronic diastolic heart failure  Will get echo in case of need for surgical clearance      Hypertension  Chronic, controlled. Latest blood pressure and vitals reviewed-     Temp:  [98 °F (36.7 °C)-98.9 °F (37.2 °C)]   Pulse:  [67-96]   Resp:  [16-24]   BP: (103-150)/(62-91)   SpO2:  [95 %-98 %] .   Home meds for hypertension were reviewed and noted below.   Hypertension Medications               carvediloL (COREG) 6.25 MG tablet Take 6.25 mg by mouth 2 (two) times daily.    enalapril (VASOTEC) 20 MG tablet Take 1 tablet by mouth once daily.    furosemide (LASIX) 40 MG tablet Take 40 mg by mouth daily as needed.    nitroGLYCERIN (NITROSTAT) 0.4 MG SL tablet Place 0.4 mg under the tongue as needed.            While in the hospital, will manage blood pressure as follows; Continue home antihypertensive regimen    Will utilize p.r.n. blood pressure medication only if patient's blood pressure greater than 140/90 and she develops symptoms such as worsening  chest pain or shortness of breath.    Paroxysmal atrial fibrillation  Patient with Paroxysmal (<7 days) atrial fibrillation which is controlled currently with Beta Blocker. Patient is currently in atrial fibrillation.[id number] Score: 3. HASBLED Score:  . Anticoagulation indicated. Anticoagulation done with lovenox .    Nonobstructive atherosclerosis of coronary artery  Denies chest pain    Mixed hyperlipidemia  Hold meds due to NPO, resume when tolerating po      Anxiety disorder  Holding home meds due to npo status        VTE Risk Mitigation (From admission, onward)           Ordered     enoxaparin injection 150 mg  Every 12 hours         08/18/24 2250                    Discharge Planning   SHAYY: 8/21/2024     Code Status: Full Code   Is the patient medically ready for discharge?:     Reason for patient still in hospital (select all that apply): Patient trending condition and Treatment  Discharge Plan A: Home                  Susan George MD  Department of Hospital Medicine   [facility]/Surg

## 2024-08-19 NOTE — HOSPITAL COURSE
08/19/2024 pt presented with 1-2 d h/o abdominal pain and nausea, thought to have SBO.  CT done in ER shows early SBO.  Dr. Feliz consulted and ordered repeat CT with po contrast.  08/20/2024 patient resting comfortably bowel obstruction has resolving.  She is passing flatus.  Repeat CT scan showed improvement.  Dr. Yaima Black will evaluate today.  Repeat labs tomorrow DC her telemetry repeat a flattened upright abdominal x-ray tomorrow.  08/21/2024 brief discharge summary 64-year-old female followed by WILLIAM Avelar with history of recurrent bowel obstruction presented with partial bowel obstruction.  Hospital course patient was admitted to the hospital CT showed small bowel obstruction she was given bowel rest cover with some antibiotics although we did not think she had an infection.  Slowly she decompress in her bowel obstruction resolved she is now having bowel movements tolerating soft diet.  Dr. Jamil did see her and stated she was not a surgical candidate at this point.  Go ahead and send her home she does not eating and antibiotics we will continue her usual medications she will stay on a soft diet follow-up with Katia Avelar in a week.

## 2024-08-19 NOTE — PLAN OF CARE
Problem: Adult Inpatient Plan of Care  Goal: Plan of Care Review  Outcome: Progressing  Goal: Patient-Specific Goal (Individualized)  Outcome: Progressing  Goal: Absence of Hospital-Acquired Illness or Injury  Outcome: Progressing  Goal: Optimal Comfort and Wellbeing  Outcome: Progressing  Intervention: Provide Person-Centered Care  Flowsheets (Taken 8/19/2024 0539)  Trust Relationship/Rapport:   care explained   questions encouraged   questions answered  Goal: Readiness for Transition of Care  Outcome: Progressing     Problem: Bariatric Environmental Safety  Goal: Safety Maintained with Care  Outcome: Progressing     Problem: Intestinal Obstruction  Goal: Fluid and Electrolyte Balance  Outcome: Progressing  Intervention: Monitor and Manage Hypovolemia  Flowsheets (Taken 8/19/2024 0539)  Fluid/Electrolyte Management: intravenous fluid replacement initiated  Goal: Absence of Infection Signs and Symptoms  Outcome: Progressing  Goal: Optimize Nutrition Status  Outcome: Progressing     Problem: Pain Acute  Goal: Optimal Pain Control and Function  Outcome: Not Progressing  Intervention: Develop Pain Management Plan  Flowsheets (Taken 8/19/2024 0539)  Pain Management Interventions: pain management plan reviewed with patient/caregiver  Intervention: Prevent or Manage Pain  Flowsheets (Taken 8/19/2024 0539)  Sleep/Rest Enhancement:   awakenings minimized   consistent schedule promoted  Sensory Stimulation Regulation:   care clustered   lighting decreased   quiet environment promoted  Medication Review/Management: medications reviewed     Problem: Intestinal Obstruction  Goal: Optimal Bowel Function  Outcome: Not Progressing  Goal: Optimal Pain Control and Function  Outcome: Not Progressing  Intervention: Prevent or Manage Pain  Flowsheets (Taken 8/19/2024 0539)  Sleep/Rest Enhancement:   awakenings minimized   consistent schedule promoted  Pain Management Interventions: pain management plan reviewed with  patient/caregiver

## 2024-08-19 NOTE — ED NOTES
A new connect request was successfully created for:  Ruchi rodriguez  : 1960  MRN: [id number]  ConnectID: [id number]  REASON:  Admit: non-ICU  ACUITY:  30 Minutes  SUBMITTED:  2024 21:12 CDT  CLOSE       Patient up to bathroom to void x1, Grover Bernard receiving patient and received report from previous RN but this RN updated on firm fundus, small bleeding, dermoplast spray and medicated pads in place. Patient transported to [address] [place] [address] via wheelchair with infant in arms both in stable condition.

## 2024-08-19 NOTE — PLAN OF CARE
08/19/24 0931   Discharge Assessment   Assessment Type Discharge Planning Assessment   Confirmed/corrected address, phone number and insurance Yes   Confirmed Demographics Correct on Facesheet   Source of Information patient   When was your last doctors appointment? 08/06/24   Communicated SHAYY with patient/caregiver Date not available/Unable to determine   Reason For Admission bowel obstruction   People in Home child(khris), dependent   Facility Arrived From: home   Do you expect to return to your current living situation? Yes   Do you have help at home or someone to help you manage your care at home? No   Prior to hospitilization cognitive status: Alert/Oriented   Current cognitive status: Alert/Oriented   Walking or Climbing Stairs Difficulty yes   Walking or Climbing Stairs ambulation difficulty, requires equipment   Mobility Management rollator   Dressing/Bathing Difficulty no   Home Layout Able to live on 1st floor   Equipment Currently Used at Home rollator;CPAP   Readmission within 30 days? No   Patient currently being followed by outpatient case management? No   Do you currently have service(s) that help you manage your care at home? No   Do you take prescription medications? Yes   Do you have prescription coverage? Yes   Coverage [facility]   Do you have any problems affording any of your prescribed medications? No   Is the patient taking medications as prescribed? yes   Who is going to help you get home at discharge? daughter   How do you get to doctors appointments? car, drives self;family or friend will provide   Are you on dialysis? No   Do you take coumadin? No   Discharge Plan A Home   Discharge Plan B Home Health   DME Needed Upon Discharge  none   Discharge Plan discussed with: Patient   Transition of Care Barriers None   Physical Activity   On average, how many days per week do you engage in moderate to strenuous exercise (like a brisk walk)? 3 days   On average, how many minutes do you engage in  exercise at this level? 40 min   Financial Resource Strain   How hard is it for you to pay for the very basics like food, housing, medical care, and heating? Not hard   Housing Stability   In the last 12 months, was there a time when you were not able to pay the mortgage or rent on time? N   At any time in the past 12 months, were you homeless or living in a shelter (including now)? N   Transportation Needs   Has the lack of transportation kept you from medical appointments, meetings, work or from getting things needed for daily living? No   Food Insecurity   Within the past 12 months, you worried that your food would run out before you got the money to buy more. Never true   Within the past 12 months, the food you bought just didn't last and you didn't have money to get more. Never true   Stress   Do you feel stress - tense, restless, nervous, or anxious, or unable to sleep at night because your mind is troubled all the time - these days? To some exte   Social Isolation   How often do you feel lonely or isolated from those around you?  Never   Alcohol Use   Q1: How often do you have a drink containing alcohol? Never   Q2: How many drinks containing alcohol do you have on a typical day when you are drinking? None   Q3: How often do you have six or more drinks on one occasion? Never   [place]   In the past 12 months has the electric, gas, oil, or water company threatened to shut off services in your home? No   Health Literacy   How often do you need to have someone help you when you read instructions, pamphlets, or other written material from your doctor or pharmacy? Never

## 2024-08-19 NOTE — ASSESSMENT & PLAN NOTE
H/o multiple surgeries  Surgery consulted  May be worsened due to UTI  Cover with iv abx cipro and zosyn  Repeat CT with oral contrast is pending, no vomiting since admit

## 2024-08-19 NOTE — ASSESSMENT & PLAN NOTE
Patient with Paroxysmal (<7 days) atrial fibrillation which is controlled currently with Beta Blocker. Patient is currently in atrial fibrillation.[id number] Score: 3. HASBLED Score:  . Anticoagulation indicated. Anticoagulation done with lovenox .

## 2024-08-19 NOTE — PLAN OF CARE
Problem: Adult Inpatient Plan of Care  Goal: Plan of Care Review  Outcome: Progressing  Goal: Patient-Specific Goal (Individualized)  Outcome: Progressing  Goal: Absence of Hospital-Acquired Illness or Injury  Outcome: Progressing  Goal: Optimal Comfort and Wellbeing  Outcome: Progressing  Goal: Readiness for Transition of Care  Outcome: Progressing     Problem: Bariatric Environmental Safety  Goal: Safety Maintained with Care  Outcome: Progressing     Problem: Pain Acute  Goal: Optimal Pain Control and Function  Outcome: Progressing     Problem: Intestinal Obstruction  Goal: Optimal Bowel Function  Outcome: Progressing  Goal: Fluid and Electrolyte Balance  Outcome: Progressing  Goal: Absence of Infection Signs and Symptoms  Outcome: Progressing  Goal: Optimize Nutrition Status  Outcome: Progressing  Goal: Optimal Pain Control and Function  Outcome: Progressing

## 2024-08-19 NOTE — ASSESSMENT & PLAN NOTE
Chronic, controlled. Latest blood pressure and vitals reviewed-     Temp:  [98 °F (36.7 °C)-98.9 °F (37.2 °C)]   Pulse:  [67-96]   Resp:  [16-24]   BP: (103-150)/(62-91)   SpO2:  [95 %-98 %] .   Home meds for hypertension were reviewed and noted below.   Hypertension Medications               carvediloL (COREG) 6.25 MG tablet Take 6.25 mg by mouth 2 (two) times daily.    enalapril (VASOTEC) 20 MG tablet Take 1 tablet by mouth once daily.    furosemide (LASIX) 40 MG tablet Take 40 mg by mouth daily as needed.    nitroGLYCERIN (NITROSTAT) 0.4 MG SL tablet Place 0.4 mg under the tongue as needed.            While in the hospital, will manage blood pressure as follows; Continue home antihypertensive regimen    Will utilize p.r.n. blood pressure medication only if patient's blood pressure greater than 140/90 and she develops symptoms such as worsening chest pain or shortness of breath.

## 2024-08-19 NOTE — H&P
"[facility]-Med/Surg    History & Physical      Patient Name: Ruchi Sam  MRN: [id number]  Admission Date: 8/18/2024  Attending Physician: Jose Hu MD   Primary Care Provider: Janene Ford FNP-C         Patient information was obtained from patient and ER records.     Subjective:     Principal Problem:<principal problem not specified>    Chief Complaint: Abdominal pain  Chief Complaint   Patient presents with    Vomiting    Diarrhea    Abdominal Pain     Intermittent abd pain, vomiting and diarrhea onset last night, unable to keep anything down.  Pt also reports feeling weak and "drained."        HPI: The patient is a 64-year-old female with a previous history of small bowel obstructions on two previous episodes. She has atrial fibrillation, hypertension and history of multiple hernia surgeries with mesh placement in 2008 and 2017. She presents with abdominal pain which began last night after eating at her daughters house. Pain is diffuse and there is generalized pain. She had nausea and vomiting, which was persistent up until she presented to the emergency department. CT scan of the abdomen pelvis shows early small bowel obstruction. Surgery is consulted for evaluation. Patient is admitted for acute management of the above.    Past Medical History:   Diagnosis Date    Anxiety disorder, unspecified     Bilateral leg edema     Coronary artery disease     Depression     CANDELARIO (dyspnea on exertion)     Heart murmur     Hypertension     Mitral murmur     Obesity, unspecified     Obstructive sleep apnea     Paroxysmal atrial fibrillation     Sleep apnea     Urinary incontinence        Past Surgical History:   Procedure Laterality Date    ABDOMINAL SURGERY      Removed hernia mesh and replaced.    BILATERAL TUBAL LIGATION Bilateral     BOWEL RESECTION      CHOLECYSTECTOMY      CORONARY ANGIOGRAPHY Right 03/05/2024    Procedure: ANGIOGRAM, CORONARY ARTERY;  Surgeon: Norm Vásquez MD;  " Location: [facility] CATH LAB;  Service: Cardiology;  Laterality: Right;    FISTULECTOMY      hemoroidectomy      HERNIA REPAIR      x2    LEFT HEART CATHETERIZATION Left 03/05/2024    Procedure: Left heart cath;  Surgeon: Norm Vásquez MD;  Location: [facility] CATH LAB;  Service: Cardiology;  Laterality: Left;    SHOULDER SURGERY      TONSILLECTOMY         Review of patient's allergies indicates:   Allergen Reactions    Hydrocodone Other (See Comments)     Increases blood pressure very high.    Cephalexin Nausea And Vomiting     Other reaction(s): unknown    Erythromycin Nausea And Vomiting     Other reaction(s): unknown    Metronidazole Nausea And Vomiting     Other reaction(s): Vomiting       No current facility-administered medications on file prior to encounter.     Current Outpatient Medications on File Prior to Encounter   Medication Sig    ALPRAZolam (XANAX) 1 MG tablet Take 1 tablet (1 mg total) by mouth once daily. (Patient taking differently: Take 1 mg by mouth once daily. Patient stated that she alternate her xanax and her trazodone because the DrKaterin is trying to wean her off of the xanax.)    carvediloL (COREG) 6.25 MG tablet Take 6.25 mg by mouth 2 (two) times daily.    docusate sodium (COLACE) 100 MG capsule Take 100 mg by mouth Daily.    ELIQUIS 5 mg Tab Take 5 mg by mouth 2 (two) times daily.    enalapril (VASOTEC) 20 MG tablet Take 1 tablet by mouth once daily.    EScitalopram oxalate (LEXAPRO) 20 MG tablet Take 1 tablet (20 mg total) by mouth once daily.    furosemide (LASIX) 40 MG tablet Take 40 mg by mouth daily as needed.    nitroGLYCERIN (NITROSTAT) 0.4 MG SL tablet Place 0.4 mg under the tongue as needed.    ranolazine (RANEXA) 500 MG Tb12 Take 500 mg by mouth 2 (two) times daily.    rosuvastatin (CRESTOR) 5 MG tablet Take 5 mg by mouth.    traZODone (DESYREL) 50 MG tablet Take 1 tablet (50 mg total) by mouth nightly as needed for Insomnia. (Patient taking differently: Take 50 mg by mouth nightly as  "needed for Insomnia. Patient stated that she alternate her xanax and her trazodone because the dr is trying to wean her off of the xanax.)     Family History       Problem Relation (Age of Onset)    Cervical cancer Sister    Diabetes Sister    Heart attack Mother, Sister    Heart failure Mother, Sister    Hypertension Mother, Sister    Seizures Mother    Thyroid disease Mother          Tobacco Use    Smoking status: Never     Passive exposure: Never    Smokeless tobacco: Never   Substance and Sexual Activity    Alcohol use: Never    Drug use: Never    Sexual activity: Not Currently     Birth control/protection: See Surgical Hx     Review of Systems 10 point review of systems is performed and is otherwise negative unless stated as above.     Objective:     Vital Signs (Most Recent):  Temp: 98.3 °F (36.8 °C) (08/18/24 2201)  Pulse: 96 (08/18/24 2201)  Resp: 20 (08/18/24 2201)  BP: 125/86 (08/18/24 2201)  SpO2: 97 % (08/18/24 2201) Vital Signs (24h Range):  Temp:  [98.3 °F (36.8 °C)-98.6 °F (37 °C)] 98.3 °F (36.8 °C)  Pulse:  [71-96] 96  Resp:  [20-24] 20  SpO2:  [97 %-98 %] 97 %  BP: (122-150)/(83-91) 125/86     Weight: (!) 151.2 kg (333 lb 4.8 oz)  Body mass index is 57.21 kg/m².    Physical Exam   /86 (BP Location: Right arm, Patient Position: Lying)   Pulse 96   Temp 98.3 °F (36.8 °C)   Resp 20   Ht 5' 4" (1.626 m)   Wt (!) 151.2 kg (333 lb 4.8 oz)   SpO2 97%   Breastfeeding No   BMI 57.21 kg/m²     General Appearance:  Alert, cooperative, no distress, appears stated age   Head:  Normocephalic, without obvious abnormality, atraumatic   Eyes:  PERRL, conjunctiva/corneas clear, EOM's intact, fundi benign, both eyes   Ears:  Normal TM's and external ear canals, both ears   Nose: Nares normal, septum midline,mucosa normal, no drainage or sinus tenderness   Throat: Lips, mucosa, and tongue normal; teeth and gums normal   Neck: Supple, symmetrical, trachea midline, no adenopathy;  thyroid: not enlarged, " symmetric, no tenderness/mass/nodules; no carotid bruit or JVD   Back:   Symmetric, no curvature, ROM normal, no CVA tenderness   Lungs:   Clear to auscultation bilaterally, respirations unlabored   Breasts:  No masses or tenderness   Heart:  Regular rate and rhythm, S1 and S2 normal, no murmur, rub, or gallop   Abdomen:   Soft, diffuse ttp, bowel sounds active all four quadrants,  no masses, no organomegaly   Pelvic: Deferred   Extremities: Extremities normal, atraumatic, no cyanosis or edema   Pulses: 2+ and symmetric   Skin: Skin color, texture, turgor normal, no rashes or lesions   Lymph nodes: Cervical, supraclavicular, and axillary nodes normal   Neurologic: Normal         Significant Labs: All pertinent labs within the past 24 hours have been reviewed.  Recent Lab Results         08/18/24  1559        Albumin/Globulin Ratio 1.2       Albumin 4.4              ALT 24       Anion Gap 6.0       AST 29       Baso # 0.03       Basophil % 0.3       BILIRUBIN TOTAL 0.6       BUN 16       BUN/CREAT RATIO 25       Calcium 10.2       Chloride 109       CO2 24       Creatinine 0.64       eGFR >90  Comment:                      EGFR INTERPRETATION    Beginning 8/15/22 we are reporting the eGFRcr calculation as recommended by the [employer]. The eGFRcr equation has similar overall performance characteristics to the older equation, but the values may differ by more than 10% particularly at higher values of eGFRcr and younger adult ages.    NKF stages of chronic kidney disease (CKD)  Stage 1: Kidney damage with normal or increased eGFR (>90 mL/min/1.73 m^2)  Stage 2: Mild reduction in GFR (60-89 mL/min/1.73 m^2)  Stage 3a: Moderate reduction in GFR (45-59 mL/min/1.73 m^2)  Stage 3b: Moderate reduction in GFR (30-44 mL/min/1.73 m^2)  Stage 4: Severe reduction in GFR (15-29 mL/min/1.73 m^2)  Stage 5: Kidney failure (GFR <15 mL/min/1.73 m^2)           Eos # 0.01       Eos % 0.1       Globulin, Total 3.7        Glucose 127       Hematocrit 39.9       Hemoglobin 13.6       Immature Grans (Abs) 0.04       Immature Granulocytes 0.4       Lipase 91       Lymph # 3.09       LYMPH % 30.7       MCH 30.6       MCHC 34.1       MCV 89.9       Mono # 0.53       Mono % 5.3       MPV 10.0       Neut # 6.37       Neut % 63.2       nRBC 0.0       Platelet Count 209       Potassium 4.1       PROTEIN TOTAL 8.1       RBC 4.44       RDW 13.5       Sodium 139       WBC 10.07               Significant Imaging: I have reviewed all pertinent imaging results/findings within the past 24 hours.  I have reviewed and interpreted all pertinent imaging results/findings within the past 24 hours.    CT Abd/pelvis: Impression:     Changes most consistent with an early or partial small bowel obstruction.  Assessment/Plan:     Active Diagnoses:    Diagnosis Date Noted POA    SBO (small bowel obstruction) [K56.609] 08/18/2024 Unknown    Chronic diastolic heart failure [I50.32] 04/10/2023 Yes    Nonobstructive atherosclerosis of coronary artery [I25.10] 04/10/2023 Yes    Hypertension [I10] 04/10/2023 Yes    Paroxysmal atrial fibrillation [I48.0] 04/10/2023 Yes    Anxiety disorder [F41.9] 04/10/2023 Yes     - NPO, pain, nausea control, IVF ongoing  - Surgical consultation  - Cipro/Flagyl  - Holding po meds for now  - Full dose lovenox for Afib  - PRN hydralazine  - PRN Ativan   - Advance diet as tolerated    This encounter was completed via telemedicine (audio/video) w/ nursing at bedside ot assist w/ clinical exam.  SOC Audio/Visual Equipment is using HIPPA Compliant Web Platform. The patient is located in the hospital and the provider is located off site. This patient is placed in inpatient status under my care.       Participants on Call: Bedside RN, Patient, Physician on Call.   Problems Resolved During this Admission:     VTE Risk Mitigation (From admission, onward)           Ordered     enoxaparin injection 150 mg  Every 12 hours         08/18/24  4736                      Jalil Roberts MD  Department of Hospital Medicine   [facility]-Med/Surg

## 2024-08-19 NOTE — SUBJECTIVE & OBJECTIVE
Interval History:      Review of Systems   Constitutional:  Positive for activity change and appetite change. Negative for fever.   Respiratory:  Negative for chest tightness, shortness of breath and wheezing.    Cardiovascular:  Negative for chest pain.   Gastrointestinal:  Positive for abdominal pain (left sided middle), nausea and vomiting. Negative for constipation and diarrhea.   Genitourinary:  Negative for dysuria.   Skin:  Negative for rash and wound.   Neurological:  Negative for tremors and headaches.     Objective:     Vital Signs (Most Recent):  Temp: 98 °F (36.7 °C) (08/19/24 0701)  Pulse: 67 (08/19/24 0701)  Resp: 16 (08/19/24 0701)  BP: 107/62 (08/19/24 0701)  SpO2: 96 % (08/19/24 0821) Vital Signs (24h Range):  Temp:  [98 °F (36.7 °C)-98.9 °F (37.2 °C)] 98 °F (36.7 °C)  Pulse:  [67-96] 67  Resp:  [16-24] 16  SpO2:  [95 %-98 %] 96 %  BP: (103-150)/(62-91) 107/62     Weight: (!) 151.2 kg (333 lb 4.8 oz)  Body mass index is 57.21 kg/m².    Intake/Output Summary (Last 24 hours) at 8/19/2024 1148  Last data filed at 8/19/2024 0539  Gross per 24 hour   Intake 1154.17 ml   Output 250 ml   Net 904.17 ml         Physical Exam  Vitals and nursing note reviewed. Exam conducted with a chaperone present.   Constitutional:       General: She is not in acute distress.     Appearance: Normal appearance. She is obese. She is not ill-appearing.   HENT:      Head: Normocephalic and atraumatic.   Eyes:      General: No scleral icterus.     Extraocular Movements: Extraocular movements intact.   Cardiovascular:      Rate and Rhythm: Normal rate and regular rhythm.      Pulses: Normal pulses.      Heart sounds: Normal heart sounds.   Pulmonary:      Effort: Pulmonary effort is normal.      Breath sounds: Normal breath sounds.   Abdominal:      General: Abdomen is flat. Bowel sounds are normal. There is distension.      Palpations: Abdomen is soft. There is no mass.      Tenderness: There is abdominal tenderness (left mid).       Hernia: No hernia is present.      Comments:  Midline abdominal scar, no palpable hernia   Musculoskeletal:      Right lower leg: Edema present.      Left lower leg: Edema present.      Comments: Trace td   Skin:     General: Skin is warm and dry.      Capillary Refill: Capillary refill takes less than 2 seconds.      Findings: No erythema, lesion or rash.   Neurological:      General: No focal deficit present.      Mental Status: She is alert and oriented to person, place, and time.   Psychiatric:         Mood and Affect: Mood normal.         Behavior: Behavior normal.         Thought Content: Thought content normal.             Significant Labs: All pertinent labs within the past 24 hours have been reviewed.  CBC:   Recent Labs   Lab 08/18/24  1559 08/19/24  0418   WBC 10.07 7.80   HGB 13.6 11.8   HCT 39.9 35.3*    185     CMP:   Recent Labs   Lab 08/18/24  1559 08/19/24  0418    139   K 4.1 4.0    111*   CO2 24 24   BUN 16 20   CREATININE 0.64* 0.69   CALCIUM 10.2 9.1   ALBUMIN 4.4  --    BILITOT 0.6  --    ALKPHOS 108  --    AST 29  --    ALT 24  --        Significant Imaging: I have reviewed all pertinent imaging results/findings within the past 24 hours.

## 2024-08-20 LAB
ANION GAP SERPL CALC-SCNC: 2 MEQ/L (ref 2–13)
BASOPHILS # BLD AUTO: 0.01 X10(3)/MCL (ref 0.01–0.08)
BASOPHILS NFR BLD AUTO: 0.2 % (ref 0.1–1.2)
BUN SERPL-MCNC: 11 MG/DL (ref 7–20)
CALCIUM SERPL-MCNC: 8.5 MG/DL (ref 8.4–10.2)
CHLORIDE SERPL-SCNC: 111 MMOL/L (ref 98–110)
CO2 SERPL-SCNC: 24 MMOL/L (ref 21–32)
CREAT SERPL-MCNC: 0.52 MG/DL (ref 0.66–1.25)
CREAT/UREA NIT SERPL: 21 (ref 12–20)
EOSINOPHIL # BLD AUTO: 0.07 X10(3)/MCL (ref 0.04–0.36)
EOSINOPHIL NFR BLD AUTO: 1.4 % (ref 0.7–7)
ERYTHROCYTE [DISTWIDTH] IN BLOOD BY AUTOMATED COUNT: 13.5 % (ref 11–14.5)
GFR SERPLBLD CREATININE-BSD FMLA CKD-EPI: >90 ML/MIN/1.73/M2
GLUCOSE SERPL-MCNC: 96 MG/DL (ref 70–115)
HCT VFR BLD AUTO: 34.5 % (ref 36–48)
HGB BLD-MCNC: 11.4 G/DL (ref 11.8–16)
IMM GRANULOCYTES # BLD AUTO: 0.08 X10(3)/MCL (ref 0–0.03)
IMM GRANULOCYTES NFR BLD AUTO: 1.6 % (ref 0–0.5)
LYMPHOCYTES # BLD AUTO: 2.18 X10(3)/MCL (ref 1.16–3.74)
LYMPHOCYTES NFR BLD AUTO: 44.6 % (ref 20–55)
MCH RBC QN AUTO: 30.7 PG (ref 27–34)
MCHC RBC AUTO-ENTMCNC: 33 G/DL (ref 31–37)
MCV RBC AUTO: 93 FL (ref 79–99)
MONOCYTES # BLD AUTO: 0.39 X10(3)/MCL (ref 0.24–0.36)
MONOCYTES NFR BLD AUTO: 8 % (ref 4.7–12.5)
NEUTROPHILS # BLD AUTO: 2.16 X10(3)/MCL (ref 1.56–6.13)
NEUTROPHILS NFR BLD AUTO: 44.2 % (ref 37–73)
NRBC BLD AUTO-RTO: 0 %
PLATELET # BLD AUTO: 162 X10(3)/MCL (ref 140–371)
PMV BLD AUTO: 10.4 FL (ref 9.4–12.4)
POTASSIUM SERPL-SCNC: 3.7 MMOL/L (ref 3.5–5.1)
RBC # BLD AUTO: 3.71 X10(6)/MCL (ref 4–5.1)
SODIUM SERPL-SCNC: 137 MMOL/L (ref 136–145)
WBC # BLD AUTO: 4.89 X10(3)/MCL (ref 4–11.5)

## 2024-08-20 PROCEDURE — 99900031 HC PATIENT EDUCATION (STAT)

## 2024-08-20 PROCEDURE — 63600175 PHARM REV CODE 636 W HCPCS: Performed by: FAMILY MEDICINE

## 2024-08-20 PROCEDURE — 94761 N-INVAS EAR/PLS OXIMETRY MLT: CPT

## 2024-08-20 PROCEDURE — 36415 COLL VENOUS BLD VENIPUNCTURE: CPT | Performed by: FAMILY MEDICINE

## 2024-08-20 PROCEDURE — 63600175 PHARM REV CODE 636 W HCPCS: Performed by: INTERNAL MEDICINE

## 2024-08-20 PROCEDURE — 25000003 PHARM REV CODE 250: Performed by: FAMILY MEDICINE

## 2024-08-20 PROCEDURE — 85025 COMPLETE CBC W/AUTO DIFF WBC: CPT | Performed by: FAMILY MEDICINE

## 2024-08-20 PROCEDURE — 80048 BASIC METABOLIC PNL TOTAL CA: CPT | Performed by: FAMILY MEDICINE

## 2024-08-20 PROCEDURE — 21400001 HC TELEMETRY ROOM

## 2024-08-20 PROCEDURE — 25000003 PHARM REV CODE 250: Performed by: INTERNAL MEDICINE

## 2024-08-20 RX ADMIN — PIPERACILLIN AND TAZOBACTAM 4.5 G: 4; .5 INJECTION, POWDER, FOR SOLUTION INTRAVENOUS; PARENTERAL at 03:08

## 2024-08-20 RX ADMIN — PIPERACILLIN AND TAZOBACTAM 4.5 G: 4; .5 INJECTION, POWDER, FOR SOLUTION INTRAVENOUS; PARENTERAL at 06:08

## 2024-08-20 RX ADMIN — SODIUM CHLORIDE: 9 INJECTION, SOLUTION INTRAVENOUS at 10:08

## 2024-08-20 RX ADMIN — ENOXAPARIN SODIUM 150 MG: 150 INJECTION SUBCUTANEOUS at 09:08

## 2024-08-20 RX ADMIN — SODIUM CHLORIDE: 9 INJECTION, SOLUTION INTRAVENOUS at 07:08

## 2024-08-20 RX ADMIN — CIPROFLOXACIN 400 MG: 2 INJECTION, SOLUTION INTRAVENOUS at 10:08

## 2024-08-20 RX ADMIN — SODIUM CHLORIDE: 9 INJECTION, SOLUTION INTRAVENOUS at 02:08

## 2024-08-20 RX ADMIN — PIPERACILLIN AND TAZOBACTAM 4.5 G: 4; .5 INJECTION, POWDER, FOR SOLUTION INTRAVENOUS; PARENTERAL at 11:08

## 2024-08-20 NOTE — PROGRESS NOTES
"[facility]/Surg  [facility] Medicine  Progress Note    Patient Name: Ruchi Sam  MRN: [id number]  Patient Class: IP- Inpatient   Admission Date: 8/18/2024  Length of Stay: 2 days  Attending Physician: Susan George MD  Primary Care Provider: Janene Ford FNP-C        Subjective:     Principal Problem:SBO (small bowel obstruction)        HPI:  Vomiting     Diarrhea    Abdominal Pain       Intermittent abd pain, vomiting and diarrhea onset last night, unable to keep anything down.  Pt also reports feeling weak and "drained."         HPI: The patient is a 64-year-old female with a previous history of small bowel obstructions on two previous episodes. She has atrial fibrillation, hypertension and history of multiple hernia surgeries with mesh placement in 2008 and 2017. She presents with abdominal pain which began last night after eating at her daughters house. Pain is diffuse and there is generalized pain. She had nausea and vomiting, which was persistent up until she presented to the emergency department. CT scan of the abdomen pelvis shows early small bowel obstruction. Surgery is consulted for evaluation. Patient is admitted for acute management of the above.    Overview/Hospital Course:  08/19/2024 pt presented with 1-2 d h/o abdominal pain and nausea, thought to have SBO.  CT done in ER shows early SBO.  Dr. Feliz consulted and ordered repeat CT with po contrast.  08/20/2024 patient resting comfortably bowel obstruction has resolving.  She is passing flatus.  Repeat CT scan showed improvement.  Dr. Yaima Black will evaluate today.  Repeat labs tomorrow DC her telemetry repeat a flattened upright abdominal x-ray tomorrow.    Interval History:      Review of Systems   Constitutional:  Positive for activity change and appetite change. Negative for fever.   Respiratory:  Negative for chest tightness, shortness of breath and wheezing.    Cardiovascular:  Negative for chest pain. "   Gastrointestinal:  Positive for abdominal pain (left sided middle), nausea and vomiting. Negative for constipation and diarrhea.   Genitourinary:  Negative for dysuria.   Skin:  Negative for rash and wound.   Neurological:  Negative for tremors and headaches.     Objective:     Vital Signs (Most Recent):  Temp: 97.8 °F (36.6 °C) (08/20/24 0712)  Pulse: 79 (08/20/24 0712)  Resp: 16 (08/20/24 0712)  BP: (!) 142/78 (notified nurse April, RN) (08/20/24 0712)  SpO2: 96 % (08/20/24 0823) Vital Signs (24h Range):  Temp:  [97.2 °F (36.2 °C)-98.4 °F (36.9 °C)] 97.8 °F (36.6 °C)  Pulse:  [64-82] 79  Resp:  [16-20] 16  SpO2:  [96 %-98 %] 96 %  BP: (118-149)/(68-85) 142/78     Weight: (!) 154.4 kg (340 lb 8 oz)  Body mass index is 58.45 kg/m².    Intake/Output Summary (Last 24 hours) at 8/20/2024 0850  Last data filed at 8/20/2024 0500  Gross per 24 hour   Intake 3237.5 ml   Output 250 ml   Net 2987.5 ml         Physical Exam  Vitals and nursing note reviewed. Exam conducted with a chaperone present.   Constitutional:       General: She is not in acute distress.     Appearance: Normal appearance. She is obese. She is not ill-appearing.   HENT:      Head: Normocephalic and atraumatic.   Eyes:      General: No scleral icterus.     Extraocular Movements: Extraocular movements intact.   Cardiovascular:      Rate and Rhythm: Normal rate and regular rhythm.      Pulses: Normal pulses.      Heart sounds: Normal heart sounds.   Pulmonary:      Effort: Pulmonary effort is normal.      Breath sounds: Normal breath sounds.   Abdominal:      General: Abdomen is flat. Bowel sounds are normal. There is distension.      Palpations: Abdomen is soft. There is no mass.      Tenderness: There is abdominal tenderness (left mid).      Hernia: No hernia is present.      Comments:  Midline abdominal scar, no palpable hernia   Musculoskeletal:      Right lower leg: Edema present.      Left lower leg: Edema present.      Comments: Trace td   Skin:      General: Skin is warm and dry.      Capillary Refill: Capillary refill takes less than 2 seconds.      Findings: No erythema, lesion or rash.   Neurological:      General: No focal deficit present.      Mental Status: She is alert and oriented to person, place, and time.   Psychiatric:         Mood and Affect: Mood normal.         Behavior: Behavior normal.         Thought Content: Thought content normal.             Significant Labs: All pertinent labs within the past 24 hours have been reviewed.  CBC:   Recent Labs   Lab 08/18/24  1559 08/19/24  0418 08/20/24  0444   WBC 10.07 7.80 4.89   HGB 13.6 11.8 11.4*   HCT 39.9 35.3* 34.5*    185 162     CMP:   Recent Labs   Lab 08/18/24  1559 08/19/24  0418 08/20/24  0444    139 137   K 4.1 4.0 3.7    111* 111*   CO2 24 24 24   BUN 16 20 11   CREATININE 0.64* 0.69 0.52*   CALCIUM 10.2 9.1 8.5   ALBUMIN 4.4  --   --    BILITOT 0.6  --   --    ALKPHOS 108  --   --    AST 29  --   --    ALT 24  --   --        Significant Imaging: I have reviewed all pertinent imaging results/findings within the past 24 hours.    Assessment/Plan:      * SBO (small bowel obstruction)  H/o multiple surgeries  Surgery consulted  May be worsened due to UTI  Cover with iv abx cipro and zosyn  Repeat CT with oral contrast is pending, no vomiting since admit  08/20/2024 continue current treatment have Dr. Jamil evaluate today.  Repeat labs and a flattened upright abdominal x-ray tomorrow.  DC her telemetry.      UTI (urinary tract infection)  Continue zosyn  F/u on cx      Paroxysmal atrial fibrillation  Patient with Paroxysmal (<7 days) atrial fibrillation which is controlled currently with Beta Blocker. Patient is currently in atrial fibrillation.[id number] Score: 3. HASBLED Score:  . Anticoagulation indicated. Anticoagulation done with lovenox .    Nonobstructive atherosclerosis of coronary artery  Denies chest pain    Mixed hyperlipidemia  Hold meds due to NPO, resume when  tolerating po      Hypertension  Chronic, controlled. Latest blood pressure and vitals reviewed-     Temp:  [97.2 °F (36.2 °C)-98.4 °F (36.9 °C)]   Pulse:  [64-82]   Resp:  [16-20]   BP: (118-149)/(68-85)   SpO2:  [96 %-98 %] .   Home meds for hypertension were reviewed and noted below.   Hypertension Medications               carvediloL (COREG) 6.25 MG tablet Take 6.25 mg by mouth 2 (two) times daily.    enalapril (VASOTEC) 20 MG tablet Take 1 tablet by mouth once daily.    furosemide (LASIX) 40 MG tablet Take 40 mg by mouth daily as needed.    nitroGLYCERIN (NITROSTAT) 0.4 MG SL tablet Place 0.4 mg under the tongue as needed.            While in the hospital, will manage blood pressure as follows; Continue home antihypertensive regimen    Will utilize p.r.n. blood pressure medication only if patient's blood pressure greater than 140/90 and she develops symptoms such as worsening chest pain or shortness of breath.    Chronic diastolic heart failure  Will get echo in case of need for surgical clearance      Anxiety disorder  Holding home meds due to npo status        VTE Risk Mitigation (From admission, onward)           Ordered     enoxaparin injection 150 mg  Every 12 hours         08/18/24 2250                    Discharge Planning   SHAYY: 8/21/2024     Code Status: Full Code   Is the patient medically ready for discharge?:     Reason for patient still in hospital (select all that apply): Patient unstable  Discharge Plan A: Home                  Tien Fabian MD  Department of Hospital Medicine   [facility]/Surg

## 2024-08-20 NOTE — SUBJECTIVE & OBJECTIVE
Interval History:      Review of Systems   Constitutional:  Positive for activity change and appetite change. Negative for fever.   Respiratory:  Negative for chest tightness, shortness of breath and wheezing.    Cardiovascular:  Negative for chest pain.   Gastrointestinal:  Positive for abdominal pain (left sided middle), nausea and vomiting. Negative for constipation and diarrhea.   Genitourinary:  Negative for dysuria.   Skin:  Negative for rash and wound.   Neurological:  Negative for tremors and headaches.     Objective:     Vital Signs (Most Recent):  Temp: 97.8 °F (36.6 °C) (08/20/24 0712)  Pulse: 79 (08/20/24 0712)  Resp: 16 (08/20/24 0712)  BP: (!) 142/78 (notified nurse April, RN) (08/20/24 0712)  SpO2: 96 % (08/20/24 0823) Vital Signs (24h Range):  Temp:  [97.2 °F (36.2 °C)-98.4 °F (36.9 °C)] 97.8 °F (36.6 °C)  Pulse:  [64-82] 79  Resp:  [16-20] 16  SpO2:  [96 %-98 %] 96 %  BP: (118-149)/(68-85) 142/78     Weight: (!) 154.4 kg (340 lb 8 oz)  Body mass index is 58.45 kg/m².    Intake/Output Summary (Last 24 hours) at 8/20/2024 0850  Last data filed at 8/20/2024 0500  Gross per 24 hour   Intake 3237.5 ml   Output 250 ml   Net 2987.5 ml         Physical Exam  Vitals and nursing note reviewed. Exam conducted with a chaperone present.   Constitutional:       General: She is not in acute distress.     Appearance: Normal appearance. She is obese. She is not ill-appearing.   HENT:      Head: Normocephalic and atraumatic.   Eyes:      General: No scleral icterus.     Extraocular Movements: Extraocular movements intact.   Cardiovascular:      Rate and Rhythm: Normal rate and regular rhythm.      Pulses: Normal pulses.      Heart sounds: Normal heart sounds.   Pulmonary:      Effort: Pulmonary effort is normal.      Breath sounds: Normal breath sounds.   Abdominal:      General: Abdomen is flat. Bowel sounds are normal. There is distension.      Palpations: Abdomen is soft. There is no mass.      Tenderness: There  is abdominal tenderness (left mid).      Hernia: No hernia is present.      Comments:  Midline abdominal scar, no palpable hernia   Musculoskeletal:      Right lower leg: Edema present.      Left lower leg: Edema present.      Comments: Trace td   Skin:     General: Skin is warm and dry.      Capillary Refill: Capillary refill takes less than 2 seconds.      Findings: No erythema, lesion or rash.   Neurological:      General: No focal deficit present.      Mental Status: She is alert and oriented to person, place, and time.   Psychiatric:         Mood and Affect: Mood normal.         Behavior: Behavior normal.         Thought Content: Thought content normal.             Significant Labs: All pertinent labs within the past 24 hours have been reviewed.  CBC:   Recent Labs   Lab 08/18/24  1559 08/19/24  0418 08/20/24  0444   WBC 10.07 7.80 4.89   HGB 13.6 11.8 11.4*   HCT 39.9 35.3* 34.5*    185 162     CMP:   Recent Labs   Lab 08/18/24  1559 08/19/24  0418 08/20/24  0444    139 137   K 4.1 4.0 3.7    111* 111*   CO2 24 24 24   BUN 16 20 11   CREATININE 0.64* 0.69 0.52*   CALCIUM 10.2 9.1 8.5   ALBUMIN 4.4  --   --    BILITOT 0.6  --   --    ALKPHOS 108  --   --    AST 29  --   --    ALT 24  --   --        Significant Imaging: I have reviewed all pertinent imaging results/findings within the past 24 hours.

## 2024-08-20 NOTE — PROGRESS NOTES
Inpatient Nutrition Evaluation    Admit Date: 8/18/2024   Total duration of encounter: 2 days    Nutrition Recommendation/Prescription     Continue Clear Liquid Diet. Once medically appropriate ADAT to GI Soft.     RD to hold off on ONS due to patient dislike.    Continue to encourage PO intake and honor patient preferences.    Dietitian will monitor Electrolytes, Energy Intake, Gastrointestinal Profile, Weight, and Weight Change and adjust MNT as needed.       Monitoring & Evaluation     Communication of Recommendations: reviewed with nurse and reviewed with patient    Reason Seen: continuous nutrition monitoring    Nutrition Risk/Follow-Up: Low (follow-up in 5-7 days)  Patients assigned 'Low nutrition risk' status do not qualify for a full nutritional assessment but will be monitored and re-evaluated in a 5-7 day time period. Please consult if re-evaluation needed sooner.    RD Follow-up?: Yes  Next Date to be Seen by RD: 08/26/24  Nutrition Assessment     Chart Review    Malnutrition Screening Tool Results   Have you recently lost weight without trying?: No  Have you been eating poorly because of a decreased appetite?: Yes   MST Score: 1     Diagnosis:  SBO  UTI  Paroxysmal A.fib  Nonobstructive atherosclerosis of coronary artery  Mixed hyperlipidemia  Hypertension  Chronic Diastolic Heart Failure  Anxiety disorder      Past Medical History:   Diagnosis Date    Anxiety disorder, unspecified     Bilateral leg edema     Coronary artery disease     Depression     CANDELARIO (dyspnea on exertion)     Heart murmur     Hypertension     Mitral murmur     Obesity, unspecified     Obstructive sleep apnea     Paroxysmal atrial fibrillation     Sleep apnea     Urinary incontinence      Past Surgical History:   Procedure Laterality Date    ABDOMINAL SURGERY      Removed hernia mesh and replaced.    BILATERAL TUBAL LIGATION Bilateral     BOWEL RESECTION      CHOLECYSTECTOMY      CORONARY ANGIOGRAPHY Right 03/05/2024    Procedure:  ANGIOGRAM, CORONARY ARTERY;  Surgeon: Norm Vásquez MD;  Location: [facility] CATH LAB;  Service: Cardiology;  Laterality: Right;    FISTULECTOMY      hemoroidectomy      HERNIA REPAIR      x2    LEFT HEART CATHETERIZATION Left 03/05/2024    Procedure: Left heart cath;  Surgeon: Norm Vásquez MD;  Location: [facility] CATH LAB;  Service: Cardiology;  Laterality: Left;    SHOULDER SURGERY      TONSILLECTOMY         Scheduled Medications:  ciprofloxacin, 400 mg, Q12H  enoxparin, 1 mg/kg, Q12H (prophylaxis, 0900/2100)  piperacillin-tazobactam (Zosyn) IV (PEDS and ADULTS) (extended infusion is not appropriate), 4.5 g, Q8H    Continuous Infusions:  0.9% NaCl, Last Rate: 125 mL/hr at 08/20/24 1021    PRN Medications:   Current Facility-Administered Medications:     hydrALAZINE, 10 mg, Intravenous, Q6H PRN    lorazepam, 0.5 mg, Intravenous, Q4H PRN    morphine, 4 mg, Intravenous, Q4H PRN    ondansetron, 4 mg, Intravenous, Q6H PRN    Calorie Containing IV Medications: no significant kcals from medications at this time    Nutrition-Related Labs:  Recent Labs   Lab 08/18/24  1559 08/19/24  0418 08/20/24  0444    139 137   K 4.1 4.0 3.7   CALCIUM 10.2 9.1 8.5   CO2 24 24 24   BUN 16 20 11   CREATININE 0.64* 0.69 0.52*   EGFRNORACEVR >90 >90 >90   GLUCOSE 127* 108 96   BILITOT 0.6  --   --    ALKPHOS 108  --   --    ALT 24  --   --    AST 29  --   --    ALBUMIN 4.4  --   --    LIPASE 91  --   --    WBC 10.07 7.80 4.89   HGB 13.6 11.8 11.4*   HCT 39.9 35.3* 34.5*     CrCl:    Lab Value: 158.9    Date: 8/20    Nutrition Orders:  Diet Clear Liquid    Other Oral Supplement Order: None/Already listed above  Appetite/Oral Intake: fair/25-50% of meals  Factors Affecting Nutritional Intake: altered gastrointestinal function and clear liquid diet  Food/Baptism/Cultural Preferences:  Dislike: Spinach and Neetu Greens.  Food Allergies:   Review of patient's allergies indicates:   Allergen Reactions    Hydrocodone Other (See Comments)     " Increases blood pressure very high.    Cephalexin Nausea And Vomiting     Other reaction(s): unknown    Erythromycin Nausea And Vomiting     Other reaction(s): unknown    Metronidazole Nausea And Vomiting     Other reaction(s): Vomiting       Skin Integrity: bruised (ecchymotic)  Wound(s):       Overview/Hospital Course:    (): Patient reports good appetite up until Saturday night and started to improve as of today. Patient reports unlikely to have lost weight unintentionally but likely gained weight due to holding fluids. Nurse reports its likely sx to wait and see versus sx intervention. GI: WDL except GI symptoms, OBESE, and TENDER/LBM-, : WDL, FUNCTIONAL SCREEN:Eatin - independent, Nutrition: 3-->adequate,Campbell Score: 20, GENERALIZED 2+ EDEMA NOTED, 24 HR I/O: 3237/250.      Anthropometrics    Height: 5' 4" (162.6 cm) Height Method: Stated  Last Weight: (!) 154.4 kg (340 lb 8 oz) (24 1942) Weight Method: Bed Scale  BMI (Calculated): 58.4  BMI Classification: obese grade III (BMI >/=40)     Ideal Body Weight (IBW), Female: 120 lb     % Ideal Body Weight, Female (lb): 277.75 %                             Usual Weight Provided By: EMR weight history and patient denies unintentional weight loss    Wt Readings from Last 5 Encounters:   24 (!) 154.4 kg (340 lb 8 oz)   24 (!) 151 kg (333 lb)   24 (!) 150.8 kg (332 lb 8 oz)   24 (!) 153.6 kg (338 lb 10 oz)   24 (!) 152.4 kg (336 lb)     Weight Change(s) Since Admission:  Admit Weight: (!) 150.6 kg (332 lb) (24 1540)      Patient Education    Not applicable.          "

## 2024-08-20 NOTE — PLAN OF CARE
Problem: Adult Inpatient Plan of Care  Goal: Plan of Care Review  Outcome: Progressing  Goal: Patient-Specific Goal (Individualized)  Outcome: Progressing  Goal: Absence of Hospital-Acquired Illness or Injury  Outcome: Progressing  Goal: Optimal Comfort and Wellbeing  Outcome: Progressing  Goal: Readiness for Transition of Care  Outcome: Progressing     Problem: Bariatric Environmental Safety  Goal: Safety Maintained with Care  Outcome: Progressing     Problem: Pain Acute  Goal: Optimal Pain Control and Function  Outcome: Progressing     Problem: Intestinal Obstruction  Goal: Optimal Bowel Function  Outcome: Progressing  Goal: Fluid and Electrolyte Balance  Outcome: Progressing  Goal: Absence of Infection Signs and Symptoms  Outcome: Progressing  Goal: Optimize Nutrition Status  Outcome: Progressing  Goal: Optimal Pain Control and Function  Outcome: Progressing     Problem: UTI (Urinary Tract Infection)  Goal: Improved Infection Symptoms  Outcome: Progressing

## 2024-08-20 NOTE — ASSESSMENT & PLAN NOTE
H/o multiple surgeries  Surgery consulted  May be worsened due to UTI  Cover with iv abx cipro and zosyn  Repeat CT with oral contrast is pending, no vomiting since admit  08/20/2024 continue current treatment have Dr. Jamil evaluate today.  Repeat labs and a flattened upright abdominal x-ray tomorrow.  DC her telemetry.

## 2024-08-20 NOTE — PLAN OF CARE
Problem: Adult Inpatient Plan of Care  Goal: Plan of Care Review  Outcome: Progressing  Goal: Patient-Specific Goal (Individualized)  Outcome: Progressing  Goal: Absence of Hospital-Acquired Illness or Injury  Outcome: Progressing  Goal: Optimal Comfort and Wellbeing  Outcome: Progressing  Intervention: Provide Person-Centered Care  Flowsheets (Taken 8/20/2024 0500)  Trust Relationship/Rapport:   care explained   questions encouraged   questions answered  Goal: Readiness for Transition of Care  Outcome: Progressing     Problem: Bariatric Environmental Safety  Goal: Safety Maintained with Care  Outcome: Progressing     Problem: Pain Acute  Goal: Optimal Pain Control and Function  Outcome: Progressing  Intervention: Develop Pain Management Plan  Flowsheets (Taken 8/20/2024 0500)  Pain Management Interventions: pain management plan reviewed with patient/caregiver  Intervention: Prevent or Manage Pain  Flowsheets (Taken 8/20/2024 0500)  Sleep/Rest Enhancement:   awakenings minimized   consistent schedule promoted  Sensory Stimulation Regulation:   care clustered   lighting decreased   quiet environment promoted  Bowel Elimination Promotion:   adequate fluid intake promoted   commode/bedpan at bedside  Medication Review/Management: medications reviewed     Problem: Intestinal Obstruction  Goal: Optimal Bowel Function  Outcome: Progressing  Goal: Fluid and Electrolyte Balance  Outcome: Progressing  Goal: Absence of Infection Signs and Symptoms  Outcome: Progressing  Goal: Optimize Nutrition Status  Outcome: Progressing  Goal: Optimal Pain Control and Function  Outcome: Progressing     Problem: UTI (Urinary Tract Infection)  Goal: Improved Infection Symptoms  Outcome: Progressing  Intervention: Facilitate Optimal Urinary Elimination  Flowsheets (Taken 8/20/2024 0500)  Urinary Elimination Promotion:   frequent voiding encouraged   toileting device within reach   toileting offered   voiding relaxation promoted

## 2024-08-20 NOTE — ASSESSMENT & PLAN NOTE
I spoke to [facility] RN over the phone  Patient has a right IJ non tunneled dialysis catheter placed on 12/18/2021 and per Dr Makeda Mendez, there is no current indication for dialysis and temporary dialysis catheter removal is indicated  From IR standpoint, it is okay to remove the temporary dialysis catheter  Patient with Paroxysmal (<7 days) atrial fibrillation which is controlled currently with Beta Blocker. Patient is currently in atrial fibrillation.[id number] Score: 3. HASBLED Score:  . Anticoagulation indicated. Anticoagulation done with lovenox .

## 2024-08-20 NOTE — ASSESSMENT & PLAN NOTE
Chronic, controlled. Latest blood pressure and vitals reviewed-     Temp:  [97.2 °F (36.2 °C)-98.4 °F (36.9 °C)]   Pulse:  [64-82]   Resp:  [16-20]   BP: (118-149)/(68-85)   SpO2:  [96 %-98 %] .   Home meds for hypertension were reviewed and noted below.   Hypertension Medications               carvediloL (COREG) 6.25 MG tablet Take 6.25 mg by mouth 2 (two) times daily.    enalapril (VASOTEC) 20 MG tablet Take 1 tablet by mouth once daily.    furosemide (LASIX) 40 MG tablet Take 40 mg by mouth daily as needed.    nitroGLYCERIN (NITROSTAT) 0.4 MG SL tablet Place 0.4 mg under the tongue as needed.            While in the hospital, will manage blood pressure as follows; Continue home antihypertensive regimen    Will utilize p.r.n. blood pressure medication only if patient's blood pressure greater than 140/90 and she develops symptoms such as worsening chest pain or shortness of breath.

## 2024-08-21 VITALS
HEIGHT: 64 IN | TEMPERATURE: 99 F | BODY MASS INDEX: 50.02 KG/M2 | DIASTOLIC BLOOD PRESSURE: 87 MMHG | HEART RATE: 79 BPM | OXYGEN SATURATION: 96 % | SYSTOLIC BLOOD PRESSURE: 156 MMHG | RESPIRATION RATE: 20 BRPM | WEIGHT: 293 LBS

## 2024-08-21 LAB
ALBUMIN SERPL-MCNC: 3.2 G/DL (ref 3.4–5)
ALBUMIN/GLOB SERPL: 1.2 RATIO
ALP SERPL-CCNC: 78 UNIT/L (ref 50–144)
ALT SERPL-CCNC: 19 UNIT/L (ref 1–45)
ANION GAP SERPL CALC-SCNC: 4 MEQ/L (ref 2–13)
AST SERPL-CCNC: 24 UNIT/L (ref 14–36)
BACTERIA UR CULT: NORMAL
BASOPHILS # BLD AUTO: 0.03 X10(3)/MCL (ref 0.01–0.08)
BASOPHILS NFR BLD AUTO: 0.5 % (ref 0.1–1.2)
BILIRUB SERPL-MCNC: 0.4 MG/DL (ref 0–1)
BUN SERPL-MCNC: 7 MG/DL (ref 7–20)
CALCIUM SERPL-MCNC: 8.6 MG/DL (ref 8.4–10.2)
CHLORIDE SERPL-SCNC: 111 MMOL/L (ref 98–110)
CO2 SERPL-SCNC: 24 MMOL/L (ref 21–32)
CREAT SERPL-MCNC: 0.53 MG/DL (ref 0.66–1.25)
CREAT/UREA NIT SERPL: 13 (ref 12–20)
EOSINOPHIL # BLD AUTO: 0.03 X10(3)/MCL (ref 0.04–0.36)
EOSINOPHIL NFR BLD AUTO: 0.5 % (ref 0.7–7)
ERYTHROCYTE [DISTWIDTH] IN BLOOD BY AUTOMATED COUNT: 13.2 % (ref 11–14.5)
GFR SERPLBLD CREATININE-BSD FMLA CKD-EPI: >90 ML/MIN/1.73/M2
GLOBULIN SER-MCNC: 2.7 GM/DL (ref 2–3.9)
GLUCOSE SERPL-MCNC: 97 MG/DL (ref 70–115)
HCT VFR BLD AUTO: 33.5 % (ref 36–48)
HGB BLD-MCNC: 11.2 G/DL (ref 11.8–16)
IMM GRANULOCYTES # BLD AUTO: 0.02 X10(3)/MCL (ref 0–0.03)
IMM GRANULOCYTES NFR BLD AUTO: 0.3 % (ref 0–0.5)
LYMPHOCYTES # BLD AUTO: 2.71 X10(3)/MCL (ref 1.16–3.74)
LYMPHOCYTES NFR BLD AUTO: 44.6 % (ref 20–55)
MAGNESIUM SERPL-MCNC: 1.9 MG/DL (ref 1.8–2.4)
MCH RBC QN AUTO: 30.8 PG (ref 27–34)
MCHC RBC AUTO-ENTMCNC: 33.4 G/DL (ref 31–37)
MCV RBC AUTO: 92 FL (ref 79–99)
MONOCYTES # BLD AUTO: 0.47 X10(3)/MCL (ref 0.24–0.36)
MONOCYTES NFR BLD AUTO: 7.7 % (ref 4.7–12.5)
NEUTROPHILS # BLD AUTO: 2.81 X10(3)/MCL (ref 1.56–6.13)
NEUTROPHILS NFR BLD AUTO: 46.4 % (ref 37–73)
NRBC BLD AUTO-RTO: 0 %
PLATELET # BLD AUTO: 175 X10(3)/MCL (ref 140–371)
PMV BLD AUTO: 10.7 FL (ref 9.4–12.4)
POTASSIUM SERPL-SCNC: 3.4 MMOL/L (ref 3.5–5.1)
PROT SERPL-MCNC: 5.9 GM/DL (ref 6.3–8.2)
RBC # BLD AUTO: 3.64 X10(6)/MCL (ref 4–5.1)
SODIUM SERPL-SCNC: 139 MMOL/L (ref 136–145)
WBC # BLD AUTO: 6.07 X10(3)/MCL (ref 4–11.5)

## 2024-08-21 PROCEDURE — 80053 COMPREHEN METABOLIC PANEL: CPT | Performed by: INTERNAL MEDICINE

## 2024-08-21 PROCEDURE — 25000003 PHARM REV CODE 250: Performed by: INTERNAL MEDICINE

## 2024-08-21 PROCEDURE — 83735 ASSAY OF MAGNESIUM: CPT | Performed by: INTERNAL MEDICINE

## 2024-08-21 PROCEDURE — 36415 COLL VENOUS BLD VENIPUNCTURE: CPT | Performed by: FAMILY MEDICINE

## 2024-08-21 PROCEDURE — 63600175 PHARM REV CODE 636 W HCPCS: Performed by: FAMILY MEDICINE

## 2024-08-21 PROCEDURE — 25000003 PHARM REV CODE 250: Performed by: FAMILY MEDICINE

## 2024-08-21 PROCEDURE — 63600175 PHARM REV CODE 636 W HCPCS: Performed by: INTERNAL MEDICINE

## 2024-08-21 PROCEDURE — 85025 COMPLETE CBC W/AUTO DIFF WBC: CPT | Performed by: FAMILY MEDICINE

## 2024-08-21 PROCEDURE — 94761 N-INVAS EAR/PLS OXIMETRY MLT: CPT

## 2024-08-21 RX ADMIN — PIPERACILLIN AND TAZOBACTAM 4.5 G: 4; .5 INJECTION, POWDER, FOR SOLUTION INTRAVENOUS; PARENTERAL at 04:08

## 2024-08-21 RX ADMIN — ENOXAPARIN SODIUM 150 MG: 150 INJECTION SUBCUTANEOUS at 08:08

## 2024-08-21 RX ADMIN — SODIUM CHLORIDE: 9 INJECTION, SOLUTION INTRAVENOUS at 05:08

## 2024-08-21 NOTE — PLAN OF CARE
08/21/24 0850   Medicare Message   Important Message from Medicare regarding Discharge Appeal Rights Given to patient/caregiver;Explained to patient/caregiver;Signed/date by patient/caregiver   Date IMM was signed 08/18/24

## 2024-08-21 NOTE — PLAN OF CARE
08/21/24 0850   Final Note   Assessment Type Final Discharge Note   Anticipated Discharge Disposition Home   What phone number can be called within the next 1-3 days to see how you are doing after discharge? [phone]   Hospital Resources/Appts/Education Provided Provided patient/caregiver with written discharge plan information   Post-Acute Status   Discharge Delays None known at this time

## 2024-08-21 NOTE — PLAN OF CARE
Problem: Adult Inpatient Plan of Care  Goal: Plan of Care Review  Outcome: Met  Goal: Patient-Specific Goal (Individualized)  Outcome: Met  Goal: Absence of Hospital-Acquired Illness or Injury  Outcome: Met  Goal: Optimal Comfort and Wellbeing  Outcome: Met  Goal: Readiness for Transition of Care  Outcome: Met     Problem: Bariatric Environmental Safety  Goal: Safety Maintained with Care  Outcome: Met     Problem: Pain Acute  Goal: Optimal Pain Control and Function  Outcome: Met     Problem: Intestinal Obstruction  Goal: Optimal Bowel Function  Outcome: Met  Goal: Fluid and Electrolyte Balance  Outcome: Met  Goal: Absence of Infection Signs and Symptoms  Outcome: Met  Goal: Optimize Nutrition Status  Outcome: Met  Goal: Optimal Pain Control and Function  Outcome: Met     Problem: UTI (Urinary Tract Infection)  Goal: Improved Infection Symptoms  Outcome: Met

## 2024-08-21 NOTE — DISCHARGE SUMMARY
"[facility]  [facility] Medicine  Discharge Summary      Patient Name: Ruchi Sam  MRN: [id number]  MELLY: [id number]  Patient Class: IP- Inpatient  Admission Date: 8/18/2024  Hospital Length of Stay: 3 days  Discharge Date and Time:  08/21/2024 8:36 AM  Attending Physician: Susan George MD   Discharging Provider: Tien Fabian MD  Primary Care Provider: Janene Ford FNP-C    Primary Care Team: Networked reference to record PCT     HPI:   Vomiting     Diarrhea    Abdominal Pain       Intermittent abd pain, vomiting and diarrhea onset last night, unable to keep anything down.  Pt also reports feeling weak and "drained."         HPI: The patient is a 64-year-old female with a previous history of small bowel obstructions on two previous episodes. She has atrial fibrillation, hypertension and history of multiple hernia surgeries with mesh placement in 2008 and 2017. She presents with abdominal pain which began last night after eating at her daughters house. Pain is diffuse and there is generalized pain. She had nausea and vomiting, which was persistent up until she presented to the emergency department. CT scan of the abdomen pelvis shows early small bowel obstruction. Surgery is consulted for evaluation. Patient is admitted for acute management of the above.    * No surgery found *      Hospital Course:   08/19/2024 pt presented with 1-2 d h/o abdominal pain and nausea, thought to have SBO.  CT done in ER shows early SBO.  Dr. Feliz consulted and ordered repeat CT with po contrast.  08/20/2024 patient resting comfortably bowel obstruction has resolving.  She is passing flatus.  Repeat CT scan showed improvement.  Dr. Yaima Blcak will evaluate today.  Repeat labs tomorrow DC her telemetry repeat a flattened upright abdominal x-ray tomorrow.  08/21/2024 brief discharge summary 64-year-old female followed by DNR Rosio with history of recurrent bowel obstruction presented with " partial bowel obstruction.  Hospital course patient was admitted to the hospital CT showed small bowel obstruction she was given bowel rest cover with some antibiotics although we did not think she had an infection.  Slowly she decompress in her bowel obstruction resolved she is now having bowel movements tolerating soft diet.  Dr. Jamil did see her and stated she was not a surgical candidate at this point.  Go ahead and send her home she does not eating and antibiotics we will continue her usual medications she will stay on a soft diet follow-up with Katia Avelar in a week.     Goals of Care Treatment Preferences:  Code Status: Full Code      SDOH Screening:  The patient was screened for utility difficulties, food insecurity, transport difficulties, housing insecurity, and interpersonal safety and there were no concerns identified this admission.     Consults:   Consults (From admission, onward)          Status Ordering Provider     Inpatient consult to General surgery  Once        Provider:  (Not yet assigned)    Acknowledged GALINA BRUNO     Inpatient consult to Hospitalist  Once        Provider:  (Not yet assigned)    Acknowledged GALINA BRUNO            No new Assessment & Plan notes have been filed under this hospital service since the last note was generated.  Service: Hospital Medicine    Final Active Diagnoses:    Diagnosis Date Noted POA    PRINCIPAL PROBLEM:  SBO (small bowel obstruction) [K56.609] 08/18/2024 Yes    UTI (urinary tract infection) [N39.0] 08/19/2024 Yes    Chronic diastolic heart failure [I50.32] 04/10/2023 Yes    Nonobstructive atherosclerosis of coronary artery [I25.10] 04/10/2023 Yes    Hypertension [I10] 04/10/2023 Yes    Paroxysmal atrial fibrillation [I48.0] 04/10/2023 Yes    Anxiety disorder [F41.9] 04/10/2023 Yes    Mixed hyperlipidemia [E78.2] 04/10/2023 Yes      Problems Resolved During this Admission:       Discharged Condition: good    Disposition: Home or Self  Care    Follow Up:   Follow-up Information       Janene Ford FNP-C Follow up in 1 week(s).    Specialty: Family Medicine  Contact information:  [address]  [place] [id number] [phone]                           Patient Instructions:   No discharge procedures on file.    Significant Diagnostic Studies:     Pending Diagnostic Studies:       None           Medications:  Reconciled Home Medications:      Medication List        CHANGE how you take these medications      ALPRAZolam 1 MG tablet  Commonly known as: XANAX  Take 1 tablet (1 mg total) by mouth once daily.  What changed: additional instructions     traZODone 50 MG tablet  Commonly known as: DESYREL  Take 1 tablet (50 mg total) by mouth nightly as needed for Insomnia.  What changed: additional instructions            CONTINUE taking these medications      carvediloL 6.25 MG tablet  Commonly known as: COREG  Take 6.25 mg by mouth 2 (two) times daily.     docusate sodium 100 MG capsule  Commonly known as: COLACE  Take 100 mg by mouth Daily.     ELIQUIS 5 mg Tab  Generic drug: apixaban  Take 5 mg by mouth 2 (two) times daily.     enalapril 20 MG tablet  Commonly known as: VASOTEC  Take 1 tablet by mouth once daily.     EScitalopram oxalate 20 MG tablet  Commonly known as: LEXAPRO  Take 1 tablet (20 mg total) by mouth once daily.     furosemide 40 MG tablet  Commonly known as: LASIX  Take 40 mg by mouth daily as needed.     nitroGLYCERIN 0.4 MG SL tablet  Commonly known as: NITROSTAT  Place 0.4 mg under the tongue as needed.     ranolazine 500 MG Tb12  Commonly known as: RANEXA  Take 500 mg by mouth 2 (two) times daily.     rosuvastatin 5 MG tablet  Commonly known as: CRESTOR  Take 5 mg by mouth.              Indwelling Lines/Drains at time of discharge:   Lines/Drains/Airways       None               Patient Screened for food insecurity, housing instability, transportation needs, utility difficulties, and interpersonal safety.  No needs  identified     Time spent on the discharge of patient: 36 minutes         Tien Fabian MD  Department of Hospital Medicine  [facility]

## 2024-08-21 NOTE — PLAN OF CARE
Problem: Adult Inpatient Plan of Care  Goal: Plan of Care Review  Outcome: Progressing  Goal: Patient-Specific Goal (Individualized)  Outcome: Progressing  Goal: Absence of Hospital-Acquired Illness or Injury  Outcome: Progressing  Goal: Optimal Comfort and Wellbeing  Outcome: Progressing  Intervention: Provide Person-Centered Care  Flowsheets (Taken 8/21/2024 8400)  Trust Relationship/Rapport:   care explained   questions encouraged   questions answered  Goal: Readiness for Transition of Care  Outcome: Progressing     Problem: Bariatric Environmental Safety  Goal: Safety Maintained with Care  Outcome: Progressing     Problem: Pain Acute  Goal: Optimal Pain Control and Function  Outcome: Progressing     Problem: Intestinal Obstruction  Goal: Optimal Bowel Function  Outcome: Progressing  Goal: Fluid and Electrolyte Balance  Outcome: Progressing  Goal: Absence of Infection Signs and Symptoms  Outcome: Progressing  Goal: Optimize Nutrition Status  Outcome: Progressing  Goal: Optimal Pain Control and Function  Outcome: Progressing     Problem: UTI (Urinary Tract Infection)  Goal: Improved Infection Symptoms  Outcome: Progressing

## 2024-08-22 ENCOUNTER — PATIENT OUTREACH (OUTPATIENT)
Dept: ADMINISTRATIVE | Facility: CLINIC | Age: 64
End: 2024-08-22
Payer: MEDICARE

## 2024-08-22 NOTE — PROGRESS NOTES
C3 nurse spoke with Ruchi Sam for a TCC post hospital discharge follow up call. The patient has a scheduled [facility] appointment with Janene Ford FNP-C on 8/28/24 @ 10:30.

## 2024-08-26 ENCOUNTER — OFFICE VISIT (OUTPATIENT)
Dept: BEHAVIORAL HEALTH | Facility: CLINIC | Age: 64
End: 2024-08-26
Payer: MEDICARE

## 2024-08-26 VITALS
HEART RATE: 77 BPM | TEMPERATURE: 98 F | BODY MASS INDEX: 50.02 KG/M2 | SYSTOLIC BLOOD PRESSURE: 138 MMHG | HEIGHT: 64 IN | DIASTOLIC BLOOD PRESSURE: 88 MMHG | OXYGEN SATURATION: 97 % | WEIGHT: 293 LBS

## 2024-08-26 DIAGNOSIS — F33.42 RECURRENT MAJOR DEPRESSIVE DISORDER, IN FULL REMISSION: Primary | ICD-10-CM

## 2024-08-26 DIAGNOSIS — G47.00 INSOMNIA, UNSPECIFIED TYPE: ICD-10-CM

## 2024-08-26 DIAGNOSIS — F41.1 GENERALIZED ANXIETY DISORDER: ICD-10-CM

## 2024-08-26 PROCEDURE — 1159F MED LIST DOCD IN RCRD: CPT | Mod: ,,, | Performed by: NURSE PRACTITIONER

## 2024-08-26 PROCEDURE — 1160F RVW MEDS BY RX/DR IN RCRD: CPT | Mod: ,,, | Performed by: NURSE PRACTITIONER

## 2024-08-26 PROCEDURE — 4010F ACE/ARB THERAPY RXD/TAKEN: CPT | Mod: ,,, | Performed by: NURSE PRACTITIONER

## 2024-08-26 PROCEDURE — 3075F SYST BP GE 130 - 139MM HG: CPT | Mod: ,,, | Performed by: NURSE PRACTITIONER

## 2024-08-26 PROCEDURE — 3008F BODY MASS INDEX DOCD: CPT | Mod: ,,, | Performed by: NURSE PRACTITIONER

## 2024-08-26 PROCEDURE — 99214 OFFICE O/P EST MOD 30 MIN: CPT | Mod: ,,, | Performed by: NURSE PRACTITIONER

## 2024-08-26 PROCEDURE — 1111F DSCHRG MED/CURRENT MED MERGE: CPT | Mod: ,,, | Performed by: NURSE PRACTITIONER

## 2024-08-26 PROCEDURE — 3079F DIAST BP 80-89 MM HG: CPT | Mod: ,,, | Performed by: NURSE PRACTITIONER

## 2024-08-26 RX ORDER — TRAZODONE HYDROCHLORIDE 50 MG/1
50 TABLET ORAL NIGHTLY PRN
Qty: 30 TABLET | Refills: 1 | Status: SHIPPED | OUTPATIENT
Start: 2024-08-26

## 2024-08-26 RX ORDER — ESCITALOPRAM OXALATE 20 MG/1
20 TABLET ORAL DAILY
Qty: 30 TABLET | Refills: 1 | Status: SHIPPED | OUTPATIENT
Start: 2024-08-26

## 2024-08-26 NOTE — PROGRESS NOTES
"PSYCHIATRIC FOLLOW-UP VISIT NOTE    Chief Complaint   Patient presents with    Depression         History of Present Illness  64 y.o. year old White female with hx of MDD and ELIANA seen today for follow-up appointment and medication management.  Patient has been doing well overall since last visit.  Denies any recent depression.  Anxiety has improved and she was responded positively to addition of trazodone at bedtime.  Has been alternating taking her trazodone 1 night and Xanax the other night.  Reports good sleep with either medication.  No residual grogginess in the morning.  Denies any withdrawal symptoms on days she does not take her Xanax.  She was hospitalized 4 days last week for GI issues but is feeling better now.  Still with some residual fatigue but states this is improving day today.  Also reports her home life has been more stable and with the less discord.  She denies any side effects from current medication regimen. Patient denies SI/HI. Denies hallucinations and does not appear to be responding to internal stimuli or be internally preoccupied. No manic symptoms noted.       Objective:     Vitals:  Vitals:    08/26/24 1251   BP: 138/88   BP Location: Right arm   Pulse: 77   Temp: 97.9 °F (36.6 °C)   TempSrc: Temporal   SpO2: 97%   Weight: (!) 152.4 kg (336 lb)   Height: 5' 4" (1.626 m)       Wt Readings from Last 3 Encounters:   08/26/24 1251 (!) 152.4 kg (336 lb)   08/20/24 1946 (!) 155.6 kg (343 lb 1.6 oz)   08/19/24 1942 (!) 154.4 kg (340 lb 8 oz)   08/18/24 2201 (!) 151.2 kg (333 lb 4.8 oz)   08/18/24 1540 (!) 150.6 kg (332 lb)   08/06/24 0921 (!) 151 kg (333 lb)         Medication:    Current Outpatient Medications:     ALPRAZolam (XANAX) 1 MG tablet, Take 1 tablet (1 mg total) by mouth once daily., Disp: 30 tablet, Rfl: 0    carvediloL (COREG) 6.25 MG tablet, Take 6.25 mg by mouth 2 (two) times daily., Disp: , Rfl:     docusate sodium (COLACE) 100 MG capsule, Take 100 mg by mouth Daily., Disp: , " Rfl:     ELIQUIS 5 mg Tab, Take 5 mg by mouth 2 (two) times daily., Disp: , Rfl:     enalapril (VASOTEC) 20 MG tablet, Take 1 tablet by mouth once daily., Disp: , Rfl:     furosemide (LASIX) 40 MG tablet, Take 40 mg by mouth daily as needed., Disp: , Rfl:     nitroGLYCERIN (NITROSTAT) 0.4 MG SL tablet, Place 0.4 mg under the tongue as needed., Disp: , Rfl:     ranolazine (RANEXA) 500 MG Tb12, Take 500 mg by mouth 2 (two) times daily., Disp: , Rfl:     rosuvastatin (CRESTOR) 5 MG tablet, Take 5 mg by mouth., Disp: , Rfl:     EScitalopram oxalate (LEXAPRO) 20 MG tablet, Take 1 tablet (20 mg total) by mouth once daily., Disp: 30 tablet, Rfl: 1    traZODone (DESYREL) 50 MG tablet, Take 1 tablet (50 mg total) by mouth nightly as needed for Insomnia., Disp: 30 tablet, Rfl: 1       Significant Labs: - none at this time    Significant Imaging: - none at this time    Physical Exam  Vitals and nursing note reviewed.   Constitutional:       General: She is awake.      Appearance: Normal appearance.   Musculoskeletal:      Comments: Full ROM   Neurological:      Mental Status: She is alert.   Psychiatric:         Attention and Perception: Attention and perception normal. She does not perceive auditory or visual hallucinations.         Mood and Affect: Affect normal.         Speech: Speech normal.         Behavior: Behavior is cooperative.         Thought Content: Thought content does not include homicidal or suicidal ideation.         Cognition and Memory: Cognition and memory normal.          Review of Systems     Mental Status Exam:  Presentation:  - Appearance: 64 y.o. year old White female, appears stated age, appears Casually dressed and Well groomed  - Motility: Erect when standing, ambulates with walker. No EPS or Tremors, No psychomotor agitation or retardation appreciated  - Behavior: calm, cooperative, good eye contact  Speech:  - Character/Organization: spontaneous, fluent, normal volume, normal rate, normal  "rhythm  Emotional State:  - Mood: "happy, less anxious"   - Affect: congruent and appropriate  Thought:  - Process: logical, linear, organized , goal-directed  - Preoccupations: no ruminations, rituals, or phobias appreciated  - Delusions: no persecutory, paranoid, or grandiose delusions appreciated  - Perception: denies AVH, not actively responding to internal stimuli  - SI/HI: denies/denies  Sensorium & Intellect:  - Sensorium: AAOx4  - Memory: intact to recent and remote events  - Attention/Concentration: good/good  - Insight/Judgement: good/good    Gait: ambulates with walker  MSK:no rigidity appreciated    All other systems without acute issues unless noted in HPI      Assessment/Plan      ICD-10-CM ICD-9-CM    1. Recurrent major depressive disorder, in full remission  F33.42 296.36 EScitalopram oxalate (LEXAPRO) 20 MG tablet      traZODone (DESYREL) 50 MG tablet      2. Generalized anxiety disorder  F41.1 300.02 EScitalopram oxalate (LEXAPRO) 20 MG tablet      3. Insomnia, unspecified type  G47.00 780.52 traZODone (DESYREL) 50 MG tablet         Continue current medications without change    Potential side effects and risks vs benefits of current treatment plan reviewed with patient. Applicable black box warnings reviewed. Encouraged patient not to alter dosages or abruptly discontinue medications without contacting prescriber first, due to risk of worsening symptoms and decompensation of mental status. Warned of risks associated with herbal remedies and supplements while taking psychotropic medications and of the need to consult prescriber prior to adding any of these to current regimen. Patient should abstain from abuse of alcohol, prescription medications, and illicit drugs. Reviewed when to contact clinic and/or seek emergent care, such as but not limited to, onset/worsening SI/HI, hallucinations, delusions, manic symptoms. Pt verbalized understanding and agreement of these warnings/recommendations and " verbally consented to treatment plan.      Follow up in about 2 months (around 10/26/2024) for Medication Management.    BRAYAN PaceP

## 2024-08-28 ENCOUNTER — OFFICE VISIT (OUTPATIENT)
Dept: FAMILY MEDICINE | Facility: CLINIC | Age: 64
End: 2024-08-28
Payer: MEDICARE

## 2024-08-28 VITALS
SYSTOLIC BLOOD PRESSURE: 136 MMHG | HEART RATE: 79 BPM | WEIGHT: 293 LBS | DIASTOLIC BLOOD PRESSURE: 82 MMHG | BODY MASS INDEX: 50.02 KG/M2 | TEMPERATURE: 97 F | OXYGEN SATURATION: 93 % | HEIGHT: 64 IN

## 2024-08-28 DIAGNOSIS — K56.609 SBO (SMALL BOWEL OBSTRUCTION): ICD-10-CM

## 2024-08-28 DIAGNOSIS — Z09 HOSPITAL DISCHARGE FOLLOW-UP: Primary | ICD-10-CM

## 2024-08-28 DIAGNOSIS — I10 PRIMARY HYPERTENSION: ICD-10-CM

## 2024-08-28 DIAGNOSIS — G47.33 OBSTRUCTIVE SLEEP APNEA: ICD-10-CM

## 2024-08-28 DIAGNOSIS — E66.01 MORBID OBESITY: ICD-10-CM

## 2024-08-28 PROBLEM — I73.9 PVD (PERIPHERAL VASCULAR DISEASE): Status: ACTIVE | Noted: 2024-08-28

## 2024-08-28 PROCEDURE — 1160F RVW MEDS BY RX/DR IN RCRD: CPT | Mod: ,,, | Performed by: NURSE PRACTITIONER

## 2024-08-28 PROCEDURE — 4010F ACE/ARB THERAPY RXD/TAKEN: CPT | Mod: ,,, | Performed by: NURSE PRACTITIONER

## 2024-08-28 PROCEDURE — 3075F SYST BP GE 130 - 139MM HG: CPT | Mod: ,,, | Performed by: NURSE PRACTITIONER

## 2024-08-28 PROCEDURE — 3079F DIAST BP 80-89 MM HG: CPT | Mod: ,,, | Performed by: NURSE PRACTITIONER

## 2024-08-28 PROCEDURE — 1125F AMNT PAIN NOTED PAIN PRSNT: CPT | Mod: ,,, | Performed by: NURSE PRACTITIONER

## 2024-08-28 PROCEDURE — 3008F BODY MASS INDEX DOCD: CPT | Mod: ,,, | Performed by: NURSE PRACTITIONER

## 2024-08-28 PROCEDURE — 1101F PT FALLS ASSESS-DOCD LE1/YR: CPT | Mod: ,,, | Performed by: NURSE PRACTITIONER

## 2024-08-28 PROCEDURE — 3288F FALL RISK ASSESSMENT DOCD: CPT | Mod: ,,, | Performed by: NURSE PRACTITIONER

## 2024-08-28 PROCEDURE — 1159F MED LIST DOCD IN RCRD: CPT | Mod: ,,, | Performed by: NURSE PRACTITIONER

## 2024-08-28 PROCEDURE — 99214 OFFICE O/P EST MOD 30 MIN: CPT | Mod: ,,, | Performed by: NURSE PRACTITIONER

## 2024-08-28 RX ORDER — ACETAMINOPHEN 500 MG
1 TABLET ORAL 2 TIMES DAILY PRN
Qty: 1 EACH | Refills: 0 | Status: SHIPPED | OUTPATIENT
Start: 2024-08-28

## 2024-08-28 NOTE — ASSESSMENT & PLAN NOTE
Compliant with CPAP machine; however, mask does not seal properly. Patient contacted company who supplied machine and is waiting for replacement mask now.

## 2024-08-28 NOTE — ASSESSMENT & PLAN NOTE
Well controlled.   Continue current regimen.  Send rx for blood pressure machine.   Low Sodium Diet (DASH Diet - Less than 2 grams of sodium per day).  Monitor blood pressure daily and log. Report consistent numbers greater than 140/90.  Maintain healthy weight with goal BMI <30. Exercise 30 minutes per day, 5 days per week.  Smoking cessation encouraged to aid in BP reduction.

## 2024-08-28 NOTE — ASSESSMENT & PLAN NOTE
Resolved following bowel rest and observation at the [facility].  Continue stool softeners daily. Increased dietary fiber and water intake to avoid constipation.

## 2024-08-28 NOTE — PROGRESS NOTES
Patient ID: Ruchi Sam  : 1960    Chief Complaint: hosp follow up and cpap compliance     Allergies: Patient is allergic to hydrocodone, cephalexin, erythromycin, and metronidazole.     History of Present Illness:  The patient is a 64 y.o. White female who presents to clinic for follow up on hosp follow up and cpap compliance      Was in the hospital recently; presented with abdominal pain, n/v/and diarrhea. Admitted with SBO, seen by Dr Jamil; SBO resolved after 3 days of observation and gut rest. She has had several bowel obstructions in the past.     We recently completed a repeat sleep study and She got her CPAP. She reports that she has been compliant. She reports that she is feeling better overall. She is sleeping better, no longer wakes with a headaches, and has increased energy during the daytime. She continues to use the CPAP but her mask does not fit well; she contacted the company and has been waiting for some replacement parts/mask.       Social History:  reports that she has never smoked. She has never been exposed to tobacco smoke. She has never used smokeless tobacco. She reports that she does not drink alcohol and does not use drugs.    Past Medical History:  has a past medical history of Anxiety disorder, unspecified, Bilateral leg edema, Coronary artery disease, Depression, CANDELARIO (dyspnea on exertion), Heart murmur, Hypertension, Mitral murmur, Obesity, unspecified, Obstructive sleep apnea, Paroxysmal atrial fibrillation, Sleep apnea, and Urinary incontinence.    Current Medications:  Current Outpatient Medications   Medication Instructions    ALPRAZolam (XANAX) 1 mg, Oral, Daily    blood pressure test kit-large Kit 1 each, Misc.(Non-Drug; Combo Route), 2 times daily PRN    carvediloL (COREG) 6.25 mg, Oral, 2 times daily    docusate sodium (COLACE) 100 mg, Oral, Daily    ELIQUIS 5 mg, Oral, 2 times daily    enalapril (VASOTEC) 20 MG tablet 1 tablet, Oral, Daily    EScitalopram oxalate  "(LEXAPRO) 20 mg, Oral, Daily    furosemide (LASIX) 40 mg, Oral, Daily PRN    nitroGLYCERIN (NITROSTAT) 0.4 mg, Sublingual, As needed (PRN)    ranolazine (RANEXA) 500 mg, Oral, 2 times daily    rosuvastatin (CRESTOR) 5 mg, Oral    traZODone (DESYREL) 50 mg, Oral, Nightly PRN       Review of Systems   See HPI    Visit Vitals  /82 (BP Location: Right arm, Patient Position: Sitting)   Pulse 79   Temp 97.3 °F (36.3 °C)   Ht 5' 4.02" (1.626 m)   Wt (!) 149.9 kg (330 lb 8 oz)   SpO2 (!) 93%   BMI 56.70 kg/m²       Physical Exam  Vitals reviewed.   Constitutional:       Appearance: Normal appearance. She is obese.   Eyes:      Conjunctiva/sclera: Conjunctivae normal.   Cardiovascular:      Heart sounds: Normal heart sounds.   Pulmonary:      Breath sounds: Normal breath sounds.   Abdominal:      General: Bowel sounds are normal.      Palpations: Abdomen is soft.      Tenderness: There is no abdominal tenderness.   Skin:     General: Skin is warm and dry.   Neurological:      Mental Status: She is oriented to person, place, and time.          Labs Reviewed:  Chemistry:  Lab Results   Component Value Date     08/21/2024    K 3.4 (L) 08/21/2024    BUN 7 08/21/2024    CREATININE 0.53 (L) 08/21/2024    EGFRNORACEVR >90 08/21/2024    GLUCOSE 97 08/21/2024    CALCIUM 8.6 08/21/2024    ALKPHOS 78 08/21/2024    LABPROT 5.9 (L) 08/21/2024    ALBUMIN 3.2 (L) 08/21/2024    AST 24 08/21/2024    ALT 19 08/21/2024    MG 1.90 08/21/2024    TSH 1.340 11/07/2023      Hematology:  Lab Results   Component Value Date    WBC 6.07 08/21/2024    RBC 3.64 (L) 08/21/2024    HGB 11.2 (L) 08/21/2024    HCT 33.5 (L) 08/21/2024    MCV 92.0 08/21/2024    MCH 30.8 08/21/2024    MCHC 33.4 08/21/2024    RDW 13.2 08/21/2024     08/21/2024    MPV 10.7 08/21/2024           Assessment & Plan:  1. Hospital discharge follow-up  Comments:  Symptoms resolved.    2. SBO (small bowel obstruction)  Overview:  Multiple SBO in past.     Assessment & " Plan:  Resolved following bowel rest and observation at the [facility].  Continue stool softeners daily. Increased dietary fiber and water intake to avoid constipation.       3. Obstructive sleep apnea  Overview:  Dx w/ JOANIE on CPAP    Assessment & Plan:  Compliant with CPAP machine; however, mask does not seal properly. Patient contacted company who supplied machine and is waiting for replacement mask now.       4. Primary hypertension  Overview:  On enalapril 20 mg daily, carvedilol 6.25 mg b.i.d.    Assessment & Plan:  Well controlled.   Continue current regimen.  Send rx for blood pressure machine.   Low Sodium Diet (DASH Diet - Less than 2 grams of sodium per day).  Monitor blood pressure daily and log. Report consistent numbers greater than 140/90.  Maintain healthy weight with goal BMI <30. Exercise 30 minutes per day, 5 days per week.  Smoking cessation encouraged to aid in BP reduction.      Orders:  -     blood pressure test kit-large Kit; 1 each by Misc.(Non-Drug; Combo Route) route 2 (two) times daily as needed (htn).  Dispense: 1 each; Refill: 0    5. Morbid obesity  Assessment & Plan:  Body mass index is 56.7 kg/m².  Goal BMI <30.  Exercise 5 times a week for 30 minutes per day.  Avoid soda, simple sugars, excessive rice, potatoes or bread. Limit fast foods and fried foods.  Choose complex carbs in moderation (example: green vegetables, beans, oatmeal). Eat plenty of fresh fruits and vegetables with lean meats daily.  Do not skip meals. Eat a balanced portion size.  Avoid fad diets. Consider permanent healthy life style changes.              Future Appointments   Date Time Provider Department Center   10/25/2024  1:00 PM Vic Song PMVA [facility] GERRY Gao   11/11/2024  8:50 AM LAB, [facility] LABORATORY DRAW STATION [facility] RON Gao   11/18/2024  2:30 PM Janene Ford FNP-ENEDINA [facility] BOBBY [place] [facility]       Follow up if symptoms worsen or fail to improve, for Keep appointment as  scheduled. Call sooner if needed.    SEGUNDO Christine-C    Lab Frequency Next Occurrence   CBC Auto Differential Once 11/14/2024   Comprehensive Metabolic Panel Once 11/14/2024   Lipid Panel Once 11/14/2024   TSH Once 11/14/2024   Hemoglobin A1C Once 11/14/2024   Ambulatory referral/consult to Sleep Disorders Once 03/18/2024   Ambulatory referral/consult to Psychiatry Once 06/18/2024

## 2024-08-28 NOTE — ASSESSMENT & PLAN NOTE

## 2024-09-06 ENCOUNTER — TELEPHONE (OUTPATIENT)
Dept: FAMILY MEDICINE | Facility: CLINIC | Age: 64
End: 2024-09-06
Payer: MEDICARE

## 2024-09-06 NOTE — TELEPHONE ENCOUNTER
I show that it was faxed on 8/14/24. I called Jeanette and left a voicemail for someone to call back. I called pt and notified her. She asked if we could send the visit note from 8/28 as well. I told her that I sure could.

## 2024-09-16 ENCOUNTER — TELEPHONE (OUTPATIENT)
Dept: FAMILY MEDICINE | Facility: CLINIC | Age: 64
End: 2024-09-16
Payer: MEDICARE

## 2024-09-16 NOTE — TELEPHONE ENCOUNTER
I called and left a voicemail notifying her that the results are negative, repeat in 3 years unless something changes with her stools/frequency of stools. I also instructed her to call the office to let us know that she received the message.

## 2024-09-24 ENCOUNTER — HOSPITAL ENCOUNTER (EMERGENCY)
Facility: HOSPITAL | Age: 64
Discharge: HOME | End: 2024-09-24
Attending: FAMILY MEDICINE
Payer: MEDICARE

## 2024-09-24 VITALS
HEIGHT: 64 IN | TEMPERATURE: 98 F | WEIGHT: 293 LBS | OXYGEN SATURATION: 98 % | SYSTOLIC BLOOD PRESSURE: 132 MMHG | HEART RATE: 72 BPM | RESPIRATION RATE: 18 BRPM | DIASTOLIC BLOOD PRESSURE: 84 MMHG | BODY MASS INDEX: 50.02 KG/M2

## 2024-09-24 DIAGNOSIS — M79.671 FOOT PAIN, RIGHT: ICD-10-CM

## 2024-09-24 DIAGNOSIS — S93.602A FOOT SPRAIN, LEFT, INITIAL ENCOUNTER: Primary | ICD-10-CM

## 2024-09-24 PROCEDURE — 99283 EMERGENCY DEPT VISIT LOW MDM: CPT | Mod: 25

## 2024-09-24 PROCEDURE — 25000003 PHARM REV CODE 250

## 2024-09-24 RX ORDER — ACETAMINOPHEN 500 MG
1000 TABLET ORAL
Status: COMPLETED | OUTPATIENT
Start: 2024-09-24 | End: 2024-09-24

## 2024-09-24 RX ADMIN — ACETAMINOPHEN 1000 MG: 500 TABLET ORAL at 11:09

## 2024-09-24 NOTE — ED PROVIDER NOTES
Encounter Date: 9/24/2024       History     Chief Complaint   Patient presents with    Foot Injury     Pt reports running over her R. Foot with her walker on Sunday, reports increasing pain since.  Mild swelling noted, no bruising, sensation intact.      See MDM.     The history is provided by the patient. No  was used.     Review of patient's allergies indicates:   Allergen Reactions    Hydrocodone Other (See Comments)     Increases blood pressure very high.    Cephalexin Nausea And Vomiting     Other reaction(s): unknown    Erythromycin Nausea And Vomiting     Other reaction(s): unknown    Metronidazole Nausea And Vomiting     Other reaction(s): Vomiting     Past Medical History:   Diagnosis Date    Anxiety disorder, unspecified     Bilateral leg edema     Coronary artery disease     Depression     CANDELARIO (dyspnea on exertion)     Heart murmur     Hypertension     Mitral murmur     Obesity, unspecified     Obstructive sleep apnea     Paroxysmal atrial fibrillation     Sleep apnea     Urinary incontinence      Past Surgical History:   Procedure Laterality Date    ABDOMINAL SURGERY      Removed hernia mesh and replaced.    BILATERAL TUBAL LIGATION Bilateral     BOWEL RESECTION      CHOLECYSTECTOMY      CORONARY ANGIOGRAPHY Right 03/05/2024    Procedure: ANGIOGRAM, CORONARY ARTERY;  Surgeon: Norm Vásquez MD;  Location: [facility] CATH LAB;  Service: Cardiology;  Laterality: Right;    FISTULECTOMY      hemoroidectomy      HERNIA REPAIR      x2    LEFT HEART CATHETERIZATION Left 03/05/2024    Procedure: Left heart cath;  Surgeon: Norm Vásquez MD;  Location: [facility] CATH LAB;  Service: Cardiology;  Laterality: Left;    SHOULDER SURGERY      TONSILLECTOMY       Family History   Problem Relation Name Age of Onset    Heart attack Mother Joann [place]     Heart failure Mother Joann [place]     Hypertension Mother Joann [place]     Seizures Mother Joann [place]     Thyroid disease Mother Joann [place]     Heart  attack Sister Stefanie [place]     Diabetes Sister Stefanie [place]     Heart failure Sister Stefanie [place]     Hypertension Sister Stefanie [place]     Cervical cancer Sister Stefanie Silva     Breast cancer Neg Hx      Ovarian cancer Neg Hx      Uterine cancer Neg Hx      Colon cancer Neg Hx       Social History     Tobacco Use    Smoking status: Never     Passive exposure: Never    Smokeless tobacco: Never   Substance Use Topics    Alcohol use: Never    Drug use: Never     Review of Systems   Constitutional:  Negative for chills and fever.   Musculoskeletal:  Positive for arthralgias and myalgias.   All other systems reviewed and are negative.      Physical Exam     Initial Vitals [09/24/24 1056]   BP Pulse Resp Temp SpO2   (!) 138/91 74 20 97.8 °F (36.6 °C) 96 %      MAP       --         Physical Exam    Nursing note and vitals reviewed.  Constitutional: She appears well-developed and well-nourished. She is not diaphoretic. No distress.   Cardiovascular:  Normal rate and intact distal pulses.           Pulmonary/Chest: No respiratory distress.   Musculoskeletal:         General: Normal range of motion.      Comments: Right foot lateral tenderness and 5th metatarsal tenderness to palpation. Mild acchilles tenderness to palpation. No medial or lateral malleolus tenderness. Mild swelling to anterolateral foot. No ecchymosis, abrasion, contusion, deformity. Range of motion of toes and ankle intact. Distal pulses palpable. Cap refill brisk. All other adjacent joints otherwise normal.       Neurological: She is alert and oriented to person, place, and time.   Skin: Skin is warm and dry.   Psychiatric: She has a normal mood and affect. Her behavior is normal.         ED Course   Procedures  Labs Reviewed - No data to display       Imaging Results              X-Ray Foot Complete Right (Final result)  Result time 09/24/24 11:34:59      Final result by Cassie Arteaga III, MD (09/24/24 11:34:59)                   Impression:      1.  Chronic changes are present as described above.      Electronically signed by: Cassie Arteaga  Date:    09/24/2024  Time:    11:34               Narrative:    EXAMINATION:  STUDY: XR FOOT COMPLETE 3 VIEW RIGHT    CLINICAL HISTORY AND TECHNIQUE:  Pain    COMPARISON:  None    FINDINGS:  There is mild demineralization of the skeletal structures with moderate calcaneal spurring noted at the site of origin of the plantar fascia.  Mild degenerative changes are noted involving the right ankle, intertarsal joints, tarsal metatarsal joints and right 1st metatarsal joint.  I see no definite fractures, dislocations, or other significant abnormalities.                                       Medications   acetaminophen tablet 1,000 mg (1,000 mg Oral Given 9/24/24 1141)     Medical Decision Making  Pt is a 65 y/o female who presents with right foot pain since Sunday. States that she ran over the lateral aspect of her right foot with her walker while she was sitting on it. States that she rested the foot but it still having pain so wanted to be sure she didn't do anything worse. Was able to walk on the foot after the injury. Rates pain a 10/10 when she walks on the foot. Denies rest pain. Has not taken anything for the pain. Takes eliquis as a blood thinner.     No acute fracture or other abnormality seen on imaging. Tylenol given in ED. Instructed pt to follow-up with primary care for referral to ortho for further evaluation and management. ED precautions given. Pt expressed understanding and was in agreement with the plan.      Amount and/or Complexity of Data Reviewed  Radiology: ordered. Decision-making details documented in ED Course.    Risk  OTC drugs.      Additional MDM:   Differential Diagnosis:   Other: The following diagnoses were also considered and will be evaluated: fracture, contusion and abrasion.                                   Clinical Impression:  Final diagnoses:  [M79.671] Foot pain, right  [S93.602A] Foot  mary, left, initial encounter (Primary)          ED Disposition Condition    Discharge Stable          ED Prescriptions    None       Follow-up Information       Follow up With Specialties Details Why Contact Info    Janene Ford, FNP-C Family Medicine Call in 1 week  [address] [facility]  [address]  [place] [address]  [phone]               Eliane Chávez PA  09/24/24 3805

## 2024-09-24 NOTE — DISCHARGE INSTRUCTIONS
Ice and heat. Elevate foot. Tylenol as needed for pain. Rest for next 2-3 days, then try and mobilize as tolerated. Follow-up with primary care provider. Return with new or worsening symptoms.

## 2024-10-25 ENCOUNTER — OFFICE VISIT (OUTPATIENT)
Dept: BEHAVIORAL HEALTH | Facility: CLINIC | Age: 64
End: 2024-10-25
Payer: MEDICARE

## 2024-10-25 VITALS
WEIGHT: 293 LBS | HEIGHT: 64 IN | TEMPERATURE: 97 F | BODY MASS INDEX: 50.02 KG/M2 | DIASTOLIC BLOOD PRESSURE: 74 MMHG | HEART RATE: 84 BPM | SYSTOLIC BLOOD PRESSURE: 132 MMHG | OXYGEN SATURATION: 95 %

## 2024-10-25 DIAGNOSIS — G47.00 INSOMNIA, UNSPECIFIED TYPE: ICD-10-CM

## 2024-10-25 DIAGNOSIS — F41.1 GENERALIZED ANXIETY DISORDER: ICD-10-CM

## 2024-10-25 DIAGNOSIS — F33.42 RECURRENT MAJOR DEPRESSIVE DISORDER, IN FULL REMISSION: Primary | ICD-10-CM

## 2024-10-25 PROCEDURE — 3008F BODY MASS INDEX DOCD: CPT | Mod: ,,, | Performed by: NURSE PRACTITIONER

## 2024-10-25 PROCEDURE — 1160F RVW MEDS BY RX/DR IN RCRD: CPT | Mod: ,,, | Performed by: NURSE PRACTITIONER

## 2024-10-25 PROCEDURE — 1125F AMNT PAIN NOTED PAIN PRSNT: CPT | Mod: ,,, | Performed by: NURSE PRACTITIONER

## 2024-10-25 PROCEDURE — 99214 OFFICE O/P EST MOD 30 MIN: CPT | Mod: ,,, | Performed by: NURSE PRACTITIONER

## 2024-10-25 PROCEDURE — 3075F SYST BP GE 130 - 139MM HG: CPT | Mod: ,,, | Performed by: NURSE PRACTITIONER

## 2024-10-25 PROCEDURE — 4010F ACE/ARB THERAPY RXD/TAKEN: CPT | Mod: ,,, | Performed by: NURSE PRACTITIONER

## 2024-10-25 PROCEDURE — 3078F DIAST BP <80 MM HG: CPT | Mod: ,,, | Performed by: NURSE PRACTITIONER

## 2024-10-25 PROCEDURE — 1159F MED LIST DOCD IN RCRD: CPT | Mod: ,,, | Performed by: NURSE PRACTITIONER

## 2024-10-25 RX ORDER — TRAZODONE HYDROCHLORIDE 50 MG/1
50 TABLET ORAL NIGHTLY PRN
Qty: 30 TABLET | Refills: 1 | Status: SHIPPED | OUTPATIENT
Start: 2024-10-25

## 2024-10-25 RX ORDER — ESCITALOPRAM OXALATE 20 MG/1
20 TABLET ORAL DAILY
Qty: 30 TABLET | Refills: 1 | Status: SHIPPED | OUTPATIENT
Start: 2024-10-25

## 2024-11-14 PROCEDURE — 80061 LIPID PANEL: CPT | Performed by: NURSE PRACTITIONER

## 2024-11-14 PROCEDURE — 83036 HEMOGLOBIN GLYCOSYLATED A1C: CPT | Performed by: NURSE PRACTITIONER

## 2024-11-14 PROCEDURE — 85025 COMPLETE CBC W/AUTO DIFF WBC: CPT | Performed by: NURSE PRACTITIONER

## 2024-11-14 PROCEDURE — 84443 ASSAY THYROID STIM HORMONE: CPT | Performed by: NURSE PRACTITIONER

## 2024-11-14 PROCEDURE — 80053 COMPREHEN METABOLIC PANEL: CPT | Performed by: NURSE PRACTITIONER

## 2024-11-18 ENCOUNTER — OFFICE VISIT (OUTPATIENT)
Dept: FAMILY MEDICINE | Facility: CLINIC | Age: 64
End: 2024-11-18
Payer: MEDICARE

## 2024-11-18 VITALS
BODY MASS INDEX: 50.02 KG/M2 | HEART RATE: 72 BPM | SYSTOLIC BLOOD PRESSURE: 128 MMHG | DIASTOLIC BLOOD PRESSURE: 82 MMHG | WEIGHT: 293 LBS | OXYGEN SATURATION: 96 % | TEMPERATURE: 97 F | HEIGHT: 64 IN

## 2024-11-18 DIAGNOSIS — Z00.00 MEDICARE ANNUAL WELLNESS VISIT, SUBSEQUENT: Primary | ICD-10-CM

## 2024-11-18 DIAGNOSIS — E66.813 CLASS 3 SEVERE OBESITY DUE TO EXCESS CALORIES WITH SERIOUS COMORBIDITY AND BODY MASS INDEX (BMI) OF 50.0 TO 59.9 IN ADULT: ICD-10-CM

## 2024-11-18 DIAGNOSIS — I10 PRIMARY HYPERTENSION: ICD-10-CM

## 2024-11-18 DIAGNOSIS — M85.852 OSTEOPENIA OF BOTH HIPS: ICD-10-CM

## 2024-11-18 DIAGNOSIS — E78.2 MIXED HYPERLIPIDEMIA: ICD-10-CM

## 2024-11-18 DIAGNOSIS — M17.0 PRIMARY OSTEOARTHRITIS OF BOTH KNEES: ICD-10-CM

## 2024-11-18 DIAGNOSIS — I50.32 CHRONIC DIASTOLIC HEART FAILURE: ICD-10-CM

## 2024-11-18 DIAGNOSIS — Z91.81 AT RISK FOR FALLS: ICD-10-CM

## 2024-11-18 DIAGNOSIS — E66.01 CLASS 3 SEVERE OBESITY DUE TO EXCESS CALORIES WITH SERIOUS COMORBIDITY AND BODY MASS INDEX (BMI) OF 50.0 TO 59.9 IN ADULT: ICD-10-CM

## 2024-11-18 DIAGNOSIS — Z23 IMMUNIZATION DUE: ICD-10-CM

## 2024-11-18 DIAGNOSIS — N32.81 OAB (OVERACTIVE BLADDER): ICD-10-CM

## 2024-11-18 DIAGNOSIS — I25.10 NONOBSTRUCTIVE ATHEROSCLEROSIS OF CORONARY ARTERY: ICD-10-CM

## 2024-11-18 DIAGNOSIS — I48.0 PAROXYSMAL ATRIAL FIBRILLATION: ICD-10-CM

## 2024-11-18 DIAGNOSIS — D64.9 CHRONIC ANEMIA: ICD-10-CM

## 2024-11-18 DIAGNOSIS — F33.42 RECURRENT MAJOR DEPRESSIVE DISORDER, IN FULL REMISSION: ICD-10-CM

## 2024-11-18 DIAGNOSIS — G47.33 OBSTRUCTIVE SLEEP APNEA: ICD-10-CM

## 2024-11-18 DIAGNOSIS — Z87.19 HISTORY OF SMALL BOWEL OBSTRUCTION: ICD-10-CM

## 2024-11-18 DIAGNOSIS — I73.9 PVD (PERIPHERAL VASCULAR DISEASE): ICD-10-CM

## 2024-11-18 DIAGNOSIS — N39.46 MIXED INCONTINENCE URGE AND STRESS (MALE)(FEMALE): ICD-10-CM

## 2024-11-18 DIAGNOSIS — M85.851 OSTEOPENIA OF BOTH HIPS: ICD-10-CM

## 2024-11-18 PROBLEM — S80.01XA CONTUSION OF RIGHT KNEE: Status: RESOLVED | Noted: 2023-12-18 | Resolved: 2024-11-18

## 2024-11-18 PROBLEM — K56.609 SBO (SMALL BOWEL OBSTRUCTION): Status: RESOLVED | Noted: 2024-08-18 | Resolved: 2024-11-18

## 2024-11-18 LAB
BILIRUB UR QL STRIP: NEGATIVE
GLUCOSE UR QL STRIP: NEGATIVE
KETONES UR QL STRIP: NEGATIVE
LEUKOCYTE ESTERASE UR QL STRIP: POSITIVE
PH, POC UA: 7
POC BLOOD, URINE: NEGATIVE
POC NITRATES, URINE: NEGATIVE
PROT UR QL STRIP: NEGATIVE
SP GR UR STRIP: 1.02 (ref 1–1.03)
UROBILINOGEN UR STRIP-ACNC: 1 (ref 0.1–1.1)

## 2024-11-18 PROCEDURE — 81003 URINALYSIS AUTO W/O SCOPE: CPT | Mod: QW,RHCUB | Performed by: NURSE PRACTITIONER

## 2024-11-18 PROCEDURE — G0008 ADMIN INFLUENZA VIRUS VAC: HCPCS | Mod: ,,, | Performed by: NURSE PRACTITIONER

## 2024-11-18 PROCEDURE — 1126F AMNT PAIN NOTED NONE PRSNT: CPT | Mod: ,,, | Performed by: NURSE PRACTITIONER

## 2024-11-18 PROCEDURE — G0439 PPPS, SUBSEQ VISIT: HCPCS | Mod: ,,, | Performed by: NURSE PRACTITIONER

## 2024-11-18 PROCEDURE — 1159F MED LIST DOCD IN RCRD: CPT | Mod: ,,, | Performed by: NURSE PRACTITIONER

## 2024-11-18 PROCEDURE — 1100F PTFALLS ASSESS-DOCD GE2>/YR: CPT | Mod: ,,, | Performed by: NURSE PRACTITIONER

## 2024-11-18 PROCEDURE — 1160F RVW MEDS BY RX/DR IN RCRD: CPT | Mod: ,,, | Performed by: NURSE PRACTITIONER

## 2024-11-18 PROCEDURE — 3079F DIAST BP 80-89 MM HG: CPT | Mod: ,,, | Performed by: NURSE PRACTITIONER

## 2024-11-18 PROCEDURE — 3288F FALL RISK ASSESSMENT DOCD: CPT | Mod: ,,, | Performed by: NURSE PRACTITIONER

## 2024-11-18 PROCEDURE — 90653 IIV ADJUVANT VACCINE IM: CPT | Mod: ,,, | Performed by: NURSE PRACTITIONER

## 2024-11-18 PROCEDURE — 3044F HG A1C LEVEL LT 7.0%: CPT | Mod: ,,, | Performed by: NURSE PRACTITIONER

## 2024-11-18 PROCEDURE — 99497 ADVNCD CARE PLAN 30 MIN: CPT | Mod: ,,, | Performed by: NURSE PRACTITIONER

## 2024-11-18 PROCEDURE — 99214 OFFICE O/P EST MOD 30 MIN: CPT | Mod: 25,,, | Performed by: NURSE PRACTITIONER

## 2024-11-18 PROCEDURE — 4010F ACE/ARB THERAPY RXD/TAKEN: CPT | Mod: ,,, | Performed by: NURSE PRACTITIONER

## 2024-11-18 PROCEDURE — 3008F BODY MASS INDEX DOCD: CPT | Mod: ,,, | Performed by: NURSE PRACTITIONER

## 2024-11-18 PROCEDURE — 3074F SYST BP LT 130 MM HG: CPT | Mod: ,,, | Performed by: NURSE PRACTITIONER

## 2024-11-18 RX ORDER — OXYBUTYNIN CHLORIDE 5 MG/1
5 TABLET, EXTENDED RELEASE ORAL DAILY
Qty: 30 TABLET | Refills: 11 | Status: SHIPPED | OUTPATIENT
Start: 2024-11-18 | End: 2025-11-18

## 2024-11-18 RX ORDER — ROSUVASTATIN CALCIUM 10 MG/1
10 TABLET, COATED ORAL DAILY
Qty: 30 TABLET | Refills: 11 | Status: SHIPPED | OUTPATIENT
Start: 2024-11-18

## 2024-11-18 NOTE — ASSESSMENT & PLAN NOTE
Avoid bladder irritants such as caffeine and limit fluid intake late at night.  Start oxybutynin 5 mg daily and we will follow-up in 2 weeks.

## 2024-11-18 NOTE — ASSESSMENT & PLAN NOTE
Lab Results   Component Value Date    CHOL 200 11/14/2024    HDL 92 (H) 11/14/2024    LDLDIRECT 89.3 11/14/2024    TRIG 129 11/14/2024     Not at goal; Increase Rosuvastatin to 10 mg daily.   LDL Goal: <70  Educated on dietary modifications. Follow a low cholesterol, low saturated fat diet with less that 200mg of cholesterol a day.  Avoid fried foods and high saturated fats.  Increase dietary fiber.  Regular exercise can reduce LDL (bad cholesterol) and raise HDL (good cholesterol). Encourage physical activity 5 times per week for 30 minutes per day.

## 2024-11-18 NOTE — ASSESSMENT & PLAN NOTE

## 2024-11-18 NOTE — PROGRESS NOTES
Patient ID: Ruchi Sam  : 1960    Chief Complaint: Medicare AWV      History of Present Illness:  The patient is a 64 y.o. White female who presents to clinic for annual wellness visit.      Here for wellness. She complains of knee pain, bilateral secondary to degenerative changes. She has fallen several times.   She did see ortho in the past and had injections which worked for a little while.     She remains on vitamin D with calcium.     She has chronic urinary symptoms and reports that she has had several UTIs over the last year.  She complains of urinary urgency, some stress urgency and nighttime frequency that has been ongoing for a long period of time.  She has never tried any medications for OAB.    Allergies: Patient is allergic to hydrocodone, cephalexin, erythromycin, and metronidazole.     Past Medical History:  has a past medical history of Anxiety disorder, unspecified, Bilateral leg edema, Coronary artery disease, Depression, CANDELARIO (dyspnea on exertion), Heart murmur, Hypertension, Mitral murmur, Obesity, unspecified, Obstructive sleep apnea, Paroxysmal atrial fibrillation, Sleep apnea, and Urinary incontinence.    Surgical History:  has a past surgical history that includes Hernia repair; Cholecystectomy; Bilateral tubal ligation (Bilateral); Abdominal surgery; Bowel resection; Tonsillectomy; fistulectomy; hemoroidectomy; Coronary angiography (Right, 2024); Left heart catheterization (Left, 2024); and Shoulder surgery.    Family History: family history includes Cervical cancer in her sister; Diabetes in her sister; Heart attack in her mother and sister; Heart failure in her mother and sister; Hypertension in her mother and sister; Seizures in her mother; Thyroid disease in her mother.    Social History:  reports that she has never smoked. She has never been exposed to tobacco smoke. She has never used smokeless tobacco. She reports that she does not drink alcohol and does not  use drugs.    Care Team: Patient Care Team:  Janene Ford FNP-C as PCP - General (Family Medicine)  Danuta Keating MD as Consulting Physician (Obstetrics and Gynecology)  Vic Song PMHNP as Nurse Practitioner (Psychiatry)  Norm Vásquez MD as Consulting Physician (Cardiology)  Kentrell Benton OD as Consulting Physician (Optometry)     Current Medications:  Current Outpatient Medications   Medication Instructions    blood pressure test kit-large Kit 1 each, Misc.(Non-Drug; Combo Route), 2 times daily PRN    carvediloL (COREG) 6.25 mg, 2 times daily    docusate sodium (COLACE) 100 mg, Daily    ELIQUIS 5 mg, 2 times daily    enalapril (VASOTEC) 20 MG tablet 1 tablet, Daily    EScitalopram oxalate (LEXAPRO) 20 mg, Oral, Daily    furosemide (LASIX) 40 mg, Daily PRN    nitroGLYCERIN (NITROSTAT) 0.4 mg, As needed (PRN)    oxybutynin (DITROPAN-XL) 5 mg, Oral, Daily    ranolazine (RANEXA) 500 mg, 2 times daily    rosuvastatin (CRESTOR) 10 mg, Oral, Daily    traZODone (DESYREL) 50 mg, Oral, Nightly PRN       Opioid Screening: Patient medication list reviewed, patient is not taking prescription opioids. Patient is not using additional opioids than prescribed. Patient is at low risk of substance abuse based on this opioid use history.     Patient Reported Health Risk Assessment       Review of Systems   Constitutional:  Negative for activity change, appetite change, fatigue and unexpected weight change.   HENT:  Negative for ear pain and hearing loss.    Eyes:  Negative for visual disturbance.   Respiratory:  Negative for apnea, cough, chest tightness, shortness of breath and wheezing.    Cardiovascular:  Negative for chest pain, palpitations, leg swelling and claudication.   Gastrointestinal:  Negative for abdominal pain, anal bleeding, blood in stool, change in bowel habit, nausea, vomiting and reflux.   Endocrine: Negative for cold intolerance, heat intolerance, polydipsia, polyphagia and  "polyuria.   Genitourinary:  Negative for dysuria, frequency, hematuria and urgency.   Musculoskeletal:  Negative for arthralgias.   Integumentary:  Negative for rash and mole/lesion.   Allergic/Immunologic: Negative for environmental allergies.   Neurological:  Negative for dizziness, headaches and memory loss.        Visit Vitals  /82 (BP Location: Right arm)   Pulse 72   Temp 96.8 °F (36 °C) (Temporal)   Ht 5' 4.02" (1.626 m)   Wt (!) 149.2 kg (329 lb)   SpO2 96%   BMI 56.44 kg/m²       Physical Exam  Vitals reviewed.   Constitutional:       Appearance: Normal appearance. She is obese.   Eyes:      Conjunctiva/sclera: Conjunctivae normal.   Cardiovascular:      Rate and Rhythm: Normal rate and regular rhythm.      Pulses: Normal pulses.      Heart sounds: Normal heart sounds.   Pulmonary:      Effort: Pulmonary effort is normal.      Breath sounds: Normal breath sounds.   Abdominal:      General: Bowel sounds are normal.      Palpations: Abdomen is soft.   Musculoskeletal:      Cervical back: Neck supple.   Skin:     General: Skin is warm and dry.      Capillary Refill: Capillary refill takes less than 2 seconds.   Neurological:      Mental Status: She is alert and oriented to person, place, and time.   Psychiatric:         Mood and Affect: Mood normal.         Behavior: Behavior normal.               No data to display                  11/18/2024     2:30 PM 8/28/2024    10:30 AM 4/10/2023     1:30 PM 3/8/2023    10:30 AM   Fall Risk Assessment - Outpatient   Mobility Status Ambulatory w/ assistance Ambulatory w/ assistance Ambulatory w/ assistance Ambulatory w/ assistance   Number of falls 2+ 1 1 with injury 2+   Identified as fall risk True True True                 Labs Reviewed:  Chemistry:  Lab Results   Component Value Date     11/14/2024    K 5.0 11/14/2024    BUN 15 11/14/2024    CREATININE 0.70 11/14/2024    EGFRNORACEVR >90 11/14/2024    GLUCOSE 106 11/14/2024    CALCIUM 10.1 11/14/2024    " ALKPHOS 114 11/14/2024    LABPROT 7.3 11/14/2024    ALBUMIN 4.4 11/14/2024    AST 24 11/14/2024    ALT 18 11/14/2024    MG 1.90 08/21/2024    TSH 1.790 11/14/2024        Lab Results   Component Value Date    HGBA1C 5.3 11/14/2024        Hematology:  Lab Results   Component Value Date    WBC 7.73 11/14/2024    RBC 4.40 11/14/2024    HGB 13.5 11/14/2024    HCT 39.9 11/14/2024    MCV 90.7 11/14/2024    MCH 30.7 11/14/2024    MCHC 33.8 11/14/2024    RDW 13.2 11/14/2024     11/14/2024    MPV 10.5 11/14/2024       Lipid Panel:  Lab Results   Component Value Date    CHOL 200 11/14/2024    HDL 92 (H) 11/14/2024    LDLDIRECT 89.3 11/14/2024    TRIG 129 11/14/2024        Assessment & Plan:  1. Medicare annual wellness visit, subsequent    2. Primary hypertension  Overview:  On enalapril 20 mg daily, carvedilol 6.25 mg b.i.d.    Assessment & Plan:  Well controlled.   Continue current regimen.   Low Sodium Diet (DASH Diet - Less than 2 grams of sodium per day).  Monitor blood pressure daily and log. Report consistent numbers greater than 140/90.  Maintain healthy weight with goal BMI <30. Exercise 30 minutes per day, 5 days per week.          3. Mixed hyperlipidemia  Overview:  On Rosuvastatin 5 mg daily.     Assessment & Plan:  Lab Results   Component Value Date    CHOL 200 11/14/2024    HDL 92 (H) 11/14/2024    LDLDIRECT 89.3 11/14/2024    TRIG 129 11/14/2024     Not at goal; Increase Rosuvastatin to 10 mg daily.   LDL Goal: <70  Educated on dietary modifications. Follow a low cholesterol, low saturated fat diet with less that 200mg of cholesterol a day.  Avoid fried foods and high saturated fats.  Increase dietary fiber.  Regular exercise can reduce LDL (bad cholesterol) and raise HDL (good cholesterol). Encourage physical activity 5 times per week for 30 minutes per day.       Orders:  -     rosuvastatin (CRESTOR) 10 MG tablet; Take 1 tablet (10 mg total) by mouth once daily.  Dispense: 30 tablet; Refill: 11    4.  Class 3 severe obesity due to excess calories with serious comorbidity and body mass index (BMI) of 50.0 to 59.9 in adult  Assessment & Plan:  Body mass index is 56.44 kg/m².  Goal BMI <30.  Exercise 5 times a week for 30 minutes per day.  Avoid soda, simple sugars, excessive rice, potatoes or bread. Limit fast foods and fried foods.  Choose complex carbs in moderation (example: green vegetables, beans, oatmeal). Eat plenty of fresh fruits and vegetables with lean meats daily.  Do not skip meals. Eat a balanced portion size.  Avoid fad diets. Consider permanent healthy life style changes.         5. Chronic diastolic heart failure  Overview:  On beta blocker, diuretic and ACEI    Assessment & Plan:  Stable; continue current therapy.         6. PVD (peripheral vascular disease)  Overview:  Established with [facility] for cardiovascular      7. Paroxysmal atrial fibrillation  Overview:  On Eliquis 5 mg BID and Coreg 6.25 BID.       8. Osteopenia of both hips  Overview:  ON Calcium + D    Assessment & Plan:  Stable; continue current therapy.         9. Obstructive sleep apnea  Overview:  Dx w/ JOANIE on CPAP    Assessment & Plan:  Complaint with CPAP.      10. Mixed incontinence urge and stress (male)(female)  Comments:  Avoid bladder irritants such as caffeine.   Start Oxybutynin 5 mg Qhs and fu 1 month.  Assessment & Plan:  Avoid bladder irritants such as caffeine and limit fluid intake late at night.  Start oxybutynin 5 mg daily and we will follow-up in 2 weeks.      11. Primary osteoarthritis of both knees    12. Nonobstructive atherosclerosis of coronary artery  Overview:  [facility] June 2018    Assessment & Plan:  Stable; continue current therapy.         13. Recurrent major depressive disorder, in full remission  Overview:  On Lexapro 20 mg daily.       14. Chronic anemia  Assessment & Plan:  Stable; continue current therapy.         15. OAB (overactive bladder)  Assessment & Plan:  Avoid bladder irritants such as caffeine and  limit fluid intake late at night.  Start oxybutynin 5 mg daily and we will follow-up in 2 weeks.    Orders:  -     POCT Urinalysis, Dipstick, Automated, W/O Scope  -     Urine Culture High Risk  -     oxybutynin (DITROPAN-XL) 5 MG TR24; Take 1 tablet (5 mg total) by mouth once daily.  Dispense: 30 tablet; Refill: 11    16. At risk for falls    17. Immunization due  -     influenza (adjuvanted) (Fluad) 45 mcg/0.5 mL IM vaccine (> or = 64 yo) 0.5 mL    18. History of small bowel obstruction  Overview:  Multiple SBO in the past.            Cervical Cancer Screening-UTD  Breast Cancer Screening-UTD  Osteoporosis Screening-  Colon Cancer Screening -  UTD  Eye Exam- Recommend annually.  Dental Exam- Recommend biannually.    Vaccinations:  Immunization History   Administered Date(s) Administered    Influenza 01/25/2010, 11/24/2019    Influenza (FLUAD) - Quadrivalent - Adjuvanted - PF *Preferred* (65+) 11/14/2023    Influenza - Quadrivalent - PF *Preferred* (6 months and older) 01/08/2021, 10/20/2021, 11/08/2022    Influenza - Trivalent - Fluad - Adjuvanted - PF (65 years and older 11/18/2024    Influenza - Trivalent - Fluarix, Flulaval, Fluzone, Afluria - PF 01/25/2010, 11/24/2019    Influenza A (H1N1) 2009 Monovalent - IM - PF 01/25/2010    Tdap 12/05/2022       Future Appointments   Date Time Provider Department Center   12/27/2024  1:00 PM Vic Song [facility]   1/16/2025  1:00 PM Janene Ford FNP-C [facility]   11/14/2025  8:10 AM LAB, [facility] LABORATORY DRAW STATION [facility]   11/21/2025 11:00 [facility]       Follow up for 1), 1 mo f/u bladder, new med, 2), 1 year, [facility] wellness, [facility] prior. Call sooner if needed.    Janene Ford, FNP-C    Lab Frequency Next Occurrence   Ambulatory referral/consult to Sleep Disorders Once 03/18/2024   Ambulatory referral/consult to Psychiatry Once 06/18/2024         Medicare Annual Wellness and Personalized Prevention Plan:   Fall Risk + Home Safety + Hearing Impairment + Depression Screen + Opioid and Substance Abuse Screening + Cognitive Impairment Screen + Health Risk Assessment all reviewed.     Health Maintenance Topics with due status: Not Due       Topic Last Completion Date    TETANUS VACCINE 12/05/2022    Mammogram 07/18/2024    DEXA Scan 07/26/2024    Colorectal Cancer Screening 09/05/2024    Hemoglobin A1c (Diabetic Prevention Screening) 11/14/2024    Lipid Panel 11/14/2024    High Dose Statin 11/18/2024      The patient's Health Maintenance was reviewed and the following appears to be due at this time:   Health Maintenance Due   Topic Date Due    Hepatitis C Screening  Never done    Shingles Vaccine (1 of 2) Never done    RSV Vaccine (Age 60+ and Pregnant patients) (1 - Risk 60-74 years 1-dose series) Never done    Pneumococcal Vaccines (Age 65+) (1 of 1 - PCV) Never done    COVID-19 Vaccine (1 - 2024-25 season) Never done       A comprehensive HEALTH RISK ASSESSMENT was completed today. Results are summarized below:    There are NO EMOTIONAL/SOCIAL CONCERNS identified on today's screening for Social Isolation, Depression and Anxiety.    There are NO COGNITIVE FUNCTION CONCERNS identified on today's screening.  The following FUNCTIONAL AND/OR SAFETY CONCERNS were identified on today's screening for Physical Symptoms, Nutritional, Home Safety/Living Situation, Fall Risk, Activities of Daily Living, Independent Activities of Daily Living, Physical Activity,Timed Up and Go test and Whisper test::  *Patient reports recent HEARING/VISION DIFFICULTIES. (Have you noticed any hearing or vision difficulties?: (!) Yes)  *Patient reports recent  DIFFICULTY EATING OR TEETH/DENTURES PROBLEM. (Do you have difficulty eating or problems with your teeth or dentures?: (!) Yes)  *Patient reports PROBLEMS WITH BLADDER FUNCTION. (Do you have any problems with urination like going  too frequently, trouble urinating, leakage or accidents?: (!) Yes)  *Patient reports PHYSICAL ACTIVITY LIMITED BY PAIN/FATIGUE. (In the past four weeks have you your activities been limited by pain, tiredness or fatigue?: (!) Yes)  *Patient reports recently FEELING DIZZY, has a FEAR OF FALLING or HAS FALLEN. (In the past four weeks have you felt dizzy when standing up, were afraid of falling or fallen?: (!) Yes)  *Patient reports she has FALLEN TWO OR MORE TIMES IN THE PAST YEAR. (Have you fallen two or more times in the past year?: (!) Yes)  *Patient reports she NEEDS AMBULATION ASSISTANCE. (Do you use an assistance device to easily get around?: (!) Yes)(Ambulation Assistance: Walker (wheeled))  *Patient reports she has been in an ACCIDENT WHILE DRIVING. (In the past year, have you been in an accidents while you were driving?: (!) Yes)  *Patient's TIMED UP AND GO TEST was ABNORMAL. (Was the patient's Timed Up & Go test unsteady or longer than 30 seconds?: (!) Yes)    The patient reports NO OPIOID PRESCRIPTIONS. This was confirmed through medication reconciliation and the  website.    The patient is NOT A TOBACCO USER.  The patient reports NO SIGNIFICANT ALCOHOL USE.     All Questions regarding food, transportation or housing were not answered today.    Provided patient with a 5-10 year written screening schedule and personal prevention plan. Recommendations were developed using the USPSTF age appropriate recommendations. Education, counseling, and referrals were provided as needed. After Visit Summary printed and given to patient, which includes a list of additional screenings\tests needed.    Advance Care Planning   I attest to discussing Advance Care Planning with patient and/or family member.  Education was provided including the importance of the Health Care Power of , Advance Directives, and/or [place] documentation.  The patient expressed understanding to the importance of this information and  discussion.       Follow up for 1), 1 mo f/u bladder, new med, 2), 1 year, [facility] wellness, [facility] prior. In addition to their scheduled follow up, the patient has also been instructed to follow up on as needed basis.

## 2024-11-18 NOTE — ASSESSMENT & PLAN NOTE
Well controlled.   Continue current regimen.   Low Sodium Diet (DASH Diet - Less than 2 grams of sodium per day).  Monitor blood pressure daily and log. Report consistent numbers greater than 140/90.  Maintain healthy weight with goal BMI <30. Exercise 30 minutes per day, 5 days per week.

## 2024-11-18 NOTE — PATIENT INSTRUCTIONS
Medicare Annual Wellness Visit      Patient Name: Ruchi Sam  Today's Date: 11/18/2024    Below you will find your 5-10 year Screening Plan Recommendations:  Health Maintenance       Date Due Completion Date    Hepatitis C Screening Never done ---    Shingles Vaccine (1 of 2) Never done ---    RSV Vaccine (Age 60+ and Pregnant patients) (1 - Risk 60-74 years 1-dose series) Never done ---    Pneumococcal Vaccines (Age 65+) (1 of 1 - PCV) Never done ---    Influenza Vaccine (1) 09/01/2024 11/14/2023    COVID-19 Vaccine (1 - 2024-25 season) Never done ---    Mammogram 07/18/2025 7/18/2024    High Dose Statin 11/18/2025 11/18/2024    DEXA Scan 07/26/2027 7/26/2024    Colorectal Cancer Screening 09/05/2027 9/5/2024    Hemoglobin A1c (Diabetic Prevention Screening) 11/14/2027 11/14/2024    Lipid Panel 11/14/2029 11/14/2024    TETANUS VACCINE 12/05/2032 12/5/2022          Below is your summarized Personalized Prevention Plan that addresses any concerns we discussed today at your visit. Please see attached detailed information specific to your Health Concerns.  No orders of the defined types were placed in this encounter.        The following information is provided to all patients.  This information is to help you find additional resources for any problems that may be affecting your health: Living healthy guide: [email or web address].[place].[facility]      Understanding Diabetes: www.diabetes.org      Eating healthy: www.cdc.gov/healthyweight      CDC home safety checklist: www.cdc.gov/steadi/patient.html      Agency on Aging: www.goea.[place].[facility]      Alcoholics anonymous (AA): www.aa.org      Physical Activity: www.christopher.nih.gov/ch7ghwg      Tobacco use: www.quitwithusla.org                                 Patient Education       Weight Loss Tips   About this topic   More and more people are concerned about their weight. You can choose from many different programs. The goal of a weight loss program may be to cut down on calories or to  lose extra weight through exercise.  Losing weight may mean changing your ideas about food. Going on a diet and losing weight does not mean starving yourself. It means cutting down on the amount of food you eat, making healthy food choices, and being active.  General   Ideally, you need to lose 1 to 2 pounds (0.5 to 1 kg) a week for a healthy weight loss. Losing too much weight too fast is not good. When you take in fewer calories, you will lose weight. Your ideal calorie and weight goal depends on your current age, weight, height, and personal goals. Ask your doctor or dietitian what your ideal weight is.  You need to burn 3500 calories to lose 1 pound (0.5 kg). That means cutting out 500 calories every day for 7 days. You can cut out 500 calories per day by eating or drinking fewer calories, burning them through exercise, or doing both. To lose that extra weight and stay healthy:  Take time to exercise.  Exercise regularly. Burn calories with activity and exercise. Exercise can help you lose weight and it also strengthens your muscles. Set a schedule where you will have time to do exercises. With just 30 to 60 minutes of exercise each day, you could burn 500 extra calories. Your metabolism stays elevated for a period of time after exercise.  If you don't have time for a 30 minute workout, try three 10 minute exercises each day.  If you work near your home, walk to work. Walking is a very good form of exercise.  Take a 20 minute walk each day. Walk during your lunch break. Park far away, so you have to walk more.  Take the steps instead of elevators. You will burn more calories this way.  If you have an illness, like diabetes or high blood pressure, ask your doctor how much exercise is right for you.  Choose healthy snacks.  Low calorie healthy snacks are a good thing. They help your blood sugar stay even and prevent you from overeating at meals. Choose a balanced snack, such as a small apple with 2 tablespoons (30  "grams) of peanut butter.  Keep in mind, even "low calorie" foods can add up. Just because you choose low calorie foods does not mean you do not have to count the calories you eat.  Pack a few fresh fruits or a small salad to take to work or school. Avoid buying a snack at the nearest vending machine.  When you feel thirsty, drink water. Water has no calories and is a very good thirst quencher.  Plan healthy meals.  Plan ahead. Keep a diary of foods that are low in calories. You can also make a list of meal plans for your breakfast, lunch, and dinner. Planning ahead will prevent you from eating out at a fast food place or restaurant.  Make a grocery list before shopping so you only buy food you need. Don't go to the store hungry.  Visit a dietitian. This person will help you make meal plans that will help you lose weight.  Add fiber to your meals. Adding fiber helps you to feel full for a longer amount of time.  Take care when eating out.  Choose lower fat and lower calorie meals. Try a seafood, lean meat, or vegetarian entrée.  Share a meal with a friend.  Try a salad and appetizer instead of an entree.  Ask for a to-go box when dinner is served and put half of your meal in it for a later meal.  Have fruit for dessert.  Drink water instead of other high calorie drinks.  Learn not to overeat.  Watch your portions. For example, the recommended serving size of meat is 3 ounces (90 grams). This is the size of a deck of playing cards. Two tablespoons (30 grams) of peanut butter is the size of a ping-pong ball. One medium fruit is the size of a baseball.  Use a smaller plate or glass during dinner for less calorie intake.  Try drinking a glass or two of water before eating. This may make you feel more full and help you to eat less.  Eat slowly. Take at least 30 minutes to eat. This gives time for your brain to tell your stomach you are full. This will help you avoid overeating.  Some people eat smaller meals more often to " help not to overeat. If you can eat six small meals, make them healthy and low calorie. If three meals are best for you, know your calorie level for the day and spread it out into three healthy low calorie meals.     What will the results be?   Losing weight may make you healthier. You also may have more energy for your daily activities. You may lower disease risk. You may also add years to your life.  What changes to diet are needed?   Learn how to read nutrition labels. Know the serving size. Knowing the calories in an item will help you make healthier choices and lose weight.  Keep a diary of the food you eat. This will help you count the calories you are taking in.  Make a menu in advance. This will help you make good choices to include in your diet.  Avoid eating 2 hours before bedtime to allow for digestion. If you eat right before you go to bed, you may also have worse heartburn.  Be sure to count the calories in the things you drink. You may want to stop drinking soda pop, beer, wine, and mixed drinks (alcohol). Some coffee drinks also have a lot of calories in them.  Who should use this diet?   A weight loss diet may be needed for people with a calculated body mass index of 25 and over. This means you are overweight or obese.  Who should not use this diet?   People with BMI of 18.5 or lower should not use this diet. Do not use this diet if your doctor does not recommend weight loss.  What foods are good to eat?   Choose foods that are nutritious. Remember, portion control is key. Even a low calorie food can become high in calories if you have too big of a serving. Here is a list of foods that are good to eat:  Vegetables:   Broccoli  Asparagus  Spinach  Green leafy vegetables  Tomatoes  Onions  Mushrooms  Cucumbers  Zucchini  Lean proteins:   Egg whites  Beans including kidney, navy, black, and chickpeas  Grilled, broiled, or baked skinless chicken breast  Grilled, broiled, or baked skinless turkey  breast  Lean beef  [place] meat  Grilled, broiled, or baked fish  Beans  Nuts, such as almonds, cashews, and pistachios  Seeds  Whole grains and carbs:   Oatmeal  Brown rice  Sweet potatoes  Cereal  Whole grain bread or pasta  Fruits:   Apples  Grapefruit  Blueberries  Oranges  Bananas  Grapes  Peaches  Pineapple  Strawberries  Dairy:   Fat-free or low-fat milk and cheese  Low fat yogurt  Soy, rice, or almond milk  What foods should be limited or avoided?   Limit or avoid foods that are high in calories like:  Junk foods  Fried foods  Fatty foods  Processed meats  Food with saturated and trans fat  Whole fat dairy products  Butter  Cheese  Ice cream  Food and drinks with a lot of sugar. Some examples are beer, wine, mixed drinks (alcohol), carbonated sodas, cakes, and cookies.  When do I need to call the doctor?   Weakness  Fast heartbeat  Dizziness  Helpful tips   Join a support group and an exercise group. It is much easier to lose weight if you have support and encouragement.  Do not skip meals. If you skip a meal, you most likely will overeat at that next meal.  Eat at the dining room table instead in front of the TV to help monitor intake.  Where can I learn more?   Academy of Nutrition and Dietetics  https://www.eatright.org/health/weight-loss/your-health-and-your-weight/back-to-basics-for-healthy-weight-loss   Centers for Disease Control and Prevention  https://www.cdc.gov/healthyweight/   Weight-Control Information Network  https://www.niddk.nih.gov/health-information/diet-nutrition/changing-habits-better-health   Last Reviewed Date   2021-08-09  Consumer Information Use and Disclaimer   This information is not specific medical advice and does not replace information you receive from your health care provider. This is only a brief summary of general information. It does NOT include all information about conditions, illnesses, injuries, tests, procedures, treatments, therapies, discharge instructions or  life-style choices that may apply to you. You must talk with your health care provider for complete information about your health and treatment options. This information should not be used to decide whether or not to accept your health care providers advice, instructions or recommendations. Only your health care provider has the knowledge and training to provide advice that is right for you.  Copyright   Copyright © 2021 UpToDate, Inc. and its affiliates and/or licensors. All rights reserved.    Patient Education       Health Risks of a High BMI   About this topic   Your weight and health depend on a few things. Doctors use a method called BMI or body mass index as a tool to learn more about your risk of having health problems. This tool uses your weight and your height to find your BMI.  BMI does not include things like your habits, where you live, family history, or amount of body fat. Some people with a normal BMI are still not healthy. Other people with a high BMI may be healthy. Most of the time, a person with a higher BMI is less healthy and will need more care. Ask your doctor for their view of your total health during a well visit or physical.  Being overweight or having a high BMI can hurt many parts of your body. Learn about your BMI and how your weight changes your health risks. Then you can make changes to keep yourself as healthy as you can.  General   Weighing too much can be very harmful to your body. It can cause many illnesses and can make it hard to move about.  Blood Sugar   You have a greater chance of having high blood sugar or diabetes if you weigh too much. Your body normally makes a hormone called insulin. The insulin allows your body to use the sugar in your blood.  The cells in your body may not be able to use insulin. Then your cells cannot get the sugar from your bloodstream that they need for energy. Your pancreas has to work extra hard to try and make enough insulin to keep your blood  sugar healthy.  If you lose weight and exercise, your body is able to control your blood sugar levels better. You may not need as much insulin to keep your blood sugar levels healthy.  Your Heart   Being overweight makes your heart work harder.  The blood vessels that bring blood to your heart muscle may become narrow or blocked and cause a heart attack or your heart may not pump as well as it should. Your heart rate may not be normal.  Losing weight can lower your chances of having problems with your heart.  High Blood Pressure   Your heart has to work harder to pump blood through a larger body and to make sure all of your cells have the oxygen they need.  As your heart has to work harder, your blood pressure goes up.  Being overweight can also harm your kidneys and this may also raise your blood pressure.  You may be able to lower your blood pressure through weight loss and routine exercise.  Stroke   Strokes are more likely to happen when someone has high blood pressure, heart problems, high blood sugar, or high cholesterol.  Being overweight puts you at a higher risk for all of these health problems. These problems put you at a higher risk for having a stroke.  Losing weight may help lower your blood pressure, which is the biggest risk factor for a stroke.  Cholesterol   Your cholesterol level is likely to be higher if you are overweight.  This can lead to narrowing of the blood vessels in your heart, neck, or other parts of your body. Then you may have chest pain or signs of low blood flow to a certain area. It can also lead to a heart attack or stroke.  Lowering your weight and changing what you eat may change your cholesterol levels.  Your Liver and Kidneys   You are more likely to have certain problems with your liver or kidneys if you have a high BMI.  Fatty liver disease is caused by a buildup of fat in your liver. Then your liver may not work as well as it should.  You are at a higher risk for diabetes if  you are overweight. This illness can cause kidney problems.  There is no exact way to treat fatty liver disease. By losing weight, you may keep your liver from getting any worse and help your liver work better.  By losing weight, you lower your chance of having kidney problems. If you already have kidney problems, weight loss may help keep your disease from getting worse.  Bone and Joint Problems   Weighing too much can put a lot of stress on your joints.  The extra weight may make the cartilage wear away more quickly from your bones. Your cartilage lines the surfaces of your bones to help your joints glide more easily.  When your cartilage is worn, your joints become stiff and sore.  Losing weight and exercising is one of the best ways to treat joint pain and stiffness. It can also ease the stress on your hips, knees, and back.  Cancer Risk   Gaining weight and poor health habits raise your risk for some kinds of cancer.  Healthy eating and exercise may lower your risk for some kinds of cancer.  Sleep   With a high BMI, you may have extra fat around your neck. This can make your airway smaller and put you at risk for a problem called sleep apnea. With this problem, your breathing starts and stops when you are sleeping.  Signs of sleep apnea include snoring, feeling sleepy during the day, and problems with focusing.  Sleep apnea can lead to heart problems such as increased risk for heart disease and arrhythmias like atrial fibrillation.  Losing weight can lower the amount of fat around your neck and ease sleep apnea problems.  Pregnancy   Your weight can affect how often you have a period. It may also make it harder for you to get pregnant. A high BMI can cause problems for both mom and baby.  If you are overweight and pregnant you are at a higher risk for high blood sugar or high blood pressure while you are pregnant.  You are also more likely to have your baby early or to need a C section.  Losing weight before  you become pregnant may lower your chance for these problems. If you are pregnant, talk to your doctor before you try to lose weight.  Depression   You are more likely to suffer from depression or low mood when you have a high BMI.  You may have a low mood because of low self-esteem, lack of activity, lack of sleep, and health problems caused by being overweight.  Losing weight and finding out the reasons you overeat are some of the steps to improve your quality of life and deal with depression.  Where can I learn more?   National [place] of Diabetes and Digestive and Kidney Diseases  https://www.niddk.nih.gov/health-information/weight-management/adult-overweight-obesity/health-risks   Last Reviewed Date   2021-09-15  Consumer Information Use and Disclaimer   This information is not specific medical advice and does not replace information you receive from your health care provider. This is only a brief summary of general information. It does NOT include all information about conditions, illnesses, injuries, tests, procedures, treatments, therapies, discharge instructions or life-style choices that may apply to you. You must talk with your health care provider for complete information about your health and treatment options. This information should not be used to decide whether or not to accept your health care providers advice, instructions or recommendations. Only your health care provider has the knowledge and training to provide advice that is right for you.  Copyright   Copyright © 2021 UpToDate, Inc. and its affiliates and/or licensors. All rights reserved.    Patient Education       Anxiety Discharge Instructions, Adult   About this topic   Anxiety can cause you to feel very worried. It can also cause physical symptoms like chest pain, stomach aches, or trouble sleeping. While mild anxiety is a normal response to stress, it can cause you problems in your everyday life. You may need follow-up care to help  manage your anxiety. Anxiety happens in many forms, like:  Being scared all the time that something bad is going to happen. This is generalized anxiety.  Strong bursts of fear where your body has signs that may feel like a heart attack. This is called a panic attack.  Upsetting thoughts that happen often. There is a need to repeat doing certain things to help get rid of the anxiety caused by these thoughts. The thoughts or actions may be about checking on things, touching things, or worry about germs. This is an obsessive-compulsive disorder.  Strong fear of an object, place, or condition. This is a phobia.  Fear that others think bad things about you or being put down by other people. This is social anxiety.  Nightmares, flashbacks, staying away from people, or having panic attacks when reminded of a shocking or hurtful time or place from the past. This is post-traumatic stress.  Anxiety disorder may be treated in many ways. Some kinds of treatment have you talk about your beliefs, fears, and worries. You may learn how certain thoughts or feelings can raise anxiety. You may also learn what steps to take to lower anxiety. Other kinds of treatment may have you look back on a hurtful event, sad memory, or something you are afraid of. The doctor will help you deal with the feelings that you may have. You may learn ways to cope with unwanted events or thoughts by looking at your fears in a way that feels safe.  What care is needed at home?   Ask your doctor what you need to do when you go home. Make sure you ask questions if you do not understand what the doctor says.  Set a time to talk with a counselor about your worries and feelings. This can help you with your anxiety.  Take care to follow all instructions when you take your medicines.  Limit alcohol and caffeine.  Learn ways to manage stress. Relaxation methods like reflection, deep breathing, and muscle relaxation may be helpful. Things like yoga, exercise, and  chandni chi are also good.  Talk about your feelings with family members and friends you trust. Talk to someone who can help you see how your thoughts at certain times may raise your anxiety.     What follow-up care is needed?   Your doctor may ask you to make visits to the office to check on your progress. Be sure to keep these visits.  What drugs may be needed?   The doctor may order drugs to help the physical signs of anxiety. Make sure that you take the drugs as taught to you by the doctor. Talk with your doctor about any side effects and ask how long you should take the drug.  Will physical activity be limited?   You may take part in physical activities. Some people are limited because of their anxiety or fear. Talk with your doctor about the right amount of activity for you.  What changes to diet are needed?   Eat a variety of healthy foods and limit drinks with caffeine. You should avoid alcohol, energy drinks, and over-the-counter stimulants.  What problems could happen?   If your anxiety is not treated, it can result in:  Staying away from work or social events  Not being able to do everyday tasks  Keeping away from family and friends  What can be done to prevent this health problem?   Learn what events, people, or things upset you. Limit your contact with them.  Talk about your feelings. Talk to someone who can help you see how your thoughts at certain times may raise your anxiety.  Seek support from your friends and family. Find someone who calms you down. Ask if you can call them when you are getting anxious.  When do I need to call the doctor?   You feel you may harm yourself or someone else.  You can also call a mental health hotline for help.  You have any physical symptoms, such as chest pain, trouble breathing, or severe belly pain, that could be a sign of a serious problem.  If you are short of breath.  If you do not feel like you can be alone.  Teach Back: Helping You Understand   The Teach Back  Method helps you understand the information we are giving you. After you talk with the staff, tell them in your own words what you learned. This helps to make sure the staff has described each thing clearly. It also helps to explain things that may have been confusing. Before going home, make sure you can do these:  I can tell you about my condition and the drugs I need to take.  I can tell you what may help lower my anxiety.  I can tell you what I will do if it is hard to breathe or I have chest pain.  I can tell you what I will do if I do not feel safe or cannot be alone.  Where can I learn more?   [employer]OV[employer]  https://www.lovely.org/Learn-More/Mental-Health-Conditions/Anxiety-Disorders   National Health Service  https://www.nhs.uk/conditions/generalised-anxiety-disorder/symptoms/   [facility]  https://www.christopher.nih.gov/health/relieving-stress-anxiety-aanelbkyw-gwfijgdkxi-zvhwxbtyjs   National [place] of Mental Health  http://www.nim.nih.gov/health/publications/anxiety-disorders/complete-index.shtml   Last Reviewed Date   2021-06-08  Consumer Information Use and Disclaimer   This information is not specific medical advice and does not replace information you receive from your health care provider. This is only a brief summary of general information. It does NOT include all information about conditions, illnesses, injuries, tests, procedures, treatments, therapies, discharge instructions or life-style choices that may apply to you. You must talk with your health care provider for complete information about your health and treatment options. This information should not be used to decide whether or not to accept your health care providers advice, instructions or recommendations. Only your health care provider has the knowledge and training to provide advice that is right for you.  Copyright   Copyright © 2021 UpToDate, Inc. and its affiliates and/or licensors. All rights  reserved.    Patient Education       Exercise and Aging   General   Exercise can help older adults prevent falls and stay independent longer. Exercise may also help:  You have stronger muscles and bones and better balance  You lose weight and have more energy  Make your heart and lungs stronger  Lower your chance of health problems like heart disease, high blood sugar, or breast or colon cancer  Control conditions like high blood pressure and diabetes  You manage stress and get better sleep  Your mood, self-esteem, and self-image  How you think, plan, and pay attention  There are four types of exercise: endurance, strength, balance, and flexibility.  Endurance exercises get your heart rate up and keep it up for a while. Most people should get at least 30 minutes of exercise that gets your heart rate up on 5 or more days each week. Walking, swimming, biking, or going up and down stairs are kinds of exercises to get your heart rate up. You do not have to do all 30 minutes at one time. Try doing 10 minutes 3 times a day.  Strength exercises build muscle. Lifting weights or doing knee bends are kinds of strength exercises.  Balance exercises help to prevent falls. Raise up on your toes or stand on one leg as a kind of balance exercise.  Flexibility exercises move your joints through a full range of motion and stretch your muscles. Bend forward in a chair to stretch your back, do stretches, or bend and extend your arms or legs as a kind of flexibility exercise. Try doing 10 minutes twice a week.  Plan to include all these exercise types in your exercise program. Always check with your doctor before you start a new exercise program.  Some people do not like formal exercise classes, but there are many ways to work your muscles each day. Here are some things you can do.  Use steps instead of elevators.  Park far away in parking lots to walk farther.  Use the time while you wait. Do knee bends as you hold onto the kitchen  counter while you wait for your coffee to brew.  When you unload groceries, lift the bags or a container of milk a few times to exercise your arms and legs.  Walk the long way when you go somewhere.  After you walk to the bathroom, walk a few laps around the house before sitting down.  Try doing different standing exercises after you wash your hands each time in the bathroom.  Do balance exercises while you brush your teeth.  Do an exercise or get up and walk during TV commercials.  Don't forget to exercise small muscle groups like your hands, fingers, ankles, toes, and neck. Wiggle, bend, flex, and rotate these joints from left to right and back to front to help to keep these joints flexible.  When do I need to call the doctor?   Stop exercising and talk to your doctor if you have any of these problems:  Dizziness  Shortness of breath  Pain or pressure in the chest, arms, throat, jaw, or back  Nausea or vomiting  Blood clots  Infection  Joint swelling  Open wounds  Recent hip, back, or eye surgery  New problems that start during exercise  Helpful tips   If you have a health problem like heart disease or diabetes, talk with your doctor about the best exercises for you.  Always warm up before you stretch. Heated muscles stretch much easier than cool muscles. Try to walk or bike at an easy pace for a few minutes to warm up your muscles.  Slow your pace again after you exercise to cool down and bring your heart rate down slowly.  Be sure you do not hold your breath when you exercise because it can raise your blood pressure. If you tend to hold your breath, try to count out loud when you exercise.  Never bounce when doing stretches. Use slow and steady motions and hold your stretch for 20 to 30 seconds.  Have a routine. Doing exercises before a meal may be a good way to get into a routine.  Set small goals for yourself when you start to exercise. Use a chart to see how much you are doing. Ask someone to exercise with  you.  Exercise may be slightly uncomfortable, but you should not have sharp pains. If you do get sharp pains, stop what you are doing. If the sharp pains continue, call your doctor.  Drink plenty of fluids without caffeine when you exercise and afterwards. Avoid outdoor exercise if it is too cold or too hot.  Wear the right clothes and shoes. Try layers of clothes, so that you can take them off if you get too hot. Shoes should fit well and support your feet.  Where can I learn more?   National [place] [employer]  https://www.christopher.nih.gov/health/publication/exercise-physical-activity/introduction   Last Reviewed Date   2021-03-18  Consumer Information Use and Disclaimer   This information is not specific medical advice and does not replace information you receive from your health care provider. This is only a brief summary of general information. It does NOT include all information about conditions, illnesses, injuries, tests, procedures, treatments, therapies, discharge instructions or life-style choices that may apply to you. You must talk with your health care provider for complete information about your health and treatment options. This information should not be used to decide whether or not to accept your health care providers advice, instructions or recommendations. Only your health care provider has the knowledge and training to provide advice that is right for you.  Copyright   Copyright © 2021 UpToDate, Inc. and its affiliates and/or licensors. All rights reserved.    Patient Education       Preventing Falls in the Older Adult   About this topic   A fall is the sudden loss of balance that causes a person to drop to the ground or floor. Falls are a serious health risk and they happen more often as we get older. Many things may increase your risk of falling, like:  Problems that come with getting older  Muscle weakness  Balance problems  More trouble seeing  Personal health factors  Health conditions such as  arthritis, Parkinson's disease, low blood pressure, or stroke  Medicines you take  Loss of feeling in your feet  Being less active  Taking drugs that makes you dizzy or drowsy  Habits like alcohol use  Things around your house  Slippery floors  Unsecured rugs  Stairs  Wearing improper fitting shoes  Areas where it is dark and difficult to see  Incorrect size or type of assistive devices  Clutter and items on the floor that block your walkway     What will the results be?   Prevent future falls  Avoid injuries and disabilities  Improve overall health  Will there be any other care needed?   Ask your doctor if you need to take vitamin D to help keep your bones strong.  Make your home safer. Get rid of things that might make you trip or slip. These are things like loose rugs, electrical cords, or clutter. Add grab bars, a shower seat, and handrails.  Wear sturdy shoes that fit well. Shoes should fit well, have a low heel, and the soles of the shoe should not be slippery. Walking in socks or with bare feet can raise your chance for falling.  Stay active. Walk, garden, swim, or do something active on a regular basis. These activities may prevent you from getting hurt if you do fall. They also help with your strength and balance.  Use a cane, walker, or other safety device. Be sure it is the right size for you and that you know how to use it safely. Be sure to wear your eyeglasses if they have been ordered for you.  Get up slowly after you sit or lie down. Try to change positions slowly.  What to do if you fall:  Stay calm and do not panic.  Look for signs to decide if you have been hurt or have an injury.  If you think you can get up safely, try to get up.  If you are hurt or cannot get up on your own, try to get help.  If no one is available to help, try to get comfortable and wait for someone to arrive who can help you.  Stay warm and move regularly as you are able. Avoid putting too much pressure on any one  area.  After a fall, tell family and friends that you have fallen. It is also important to talk to your doctor about your fall right away.  What problems could happen?   A fall can lead to broken bones and other serious injuries in older adults. Problems that happen because of a fall may even lead to death in older adults. Many people are not able to return to their former level of activity after a fall.  What can be done to prevent this health problem?   Lower Your Risk of Falling  Wear your eyeglasses. Have regular eye checkups. Do not use reading glasses when you walk around.  Quit smoking and limit alcohol intake. Smoking and too much alcohol can decrease bone mass and increase the chance of broken bones.  Know the side effects of the drugs you are taking. Some drugs may affect your balance and cause confusion or sleepiness.  Get up slowly after you sit or lie down. Do not change positions quickly. Do not rush when you need to go to the bathroom or to answer the phone.  Stay Physically Active  Be physically active. This will help to improve your strength and balance.  Fear of falling may lead you to avoid activities. Talk to your doctor. You may be sent to a physical therapist. This person can help you improve balance and build your confidence. Getting rid of your fear of falling can help you stay active and prevent future falls.  Join an exercise program. Ask your doctor what exercise is safe for you. Be sure to ask before you do any exercises, especially if you have illnesses like arthritis. Exercise can help you keep muscles strong and help with your balance. It is also a good way to learn proper ways to do each activity or exercise.  Safety Tips at Home  Keep your floors and walking areas clear from clutter. Remove furniture that blocks your way. Secure cords and wires near the wall to avoid tripping over them. Get rid of throw rugs.  Be sure the lights in your house are working well and provide good  lighting throughout your home. Make sure you can reach switches and lamps easily. Place a lamp close to your bed that is easy to reach.  Fix all steps and sidewalks to make them smooth and even. Put handrails and lights on stairs.  Keep all the things you use often on low shelves or in cabinets that are at about waist level. Ask for help to move items off of high shelves. Do not use a chair as a step stool.  Keep your bathroom area safe. Use nonslip rubber mats on the floor and in the tub or shower.  Keep a phone near you in case of emergency. Keep a list of your emergency contact numbers in large print near your phone. Carry a phone with you when you go for a walk. Consider using a personal alarm device that could call for help in case you fall and cannot get up.  Think about protecting your hip. Hip protectors may be needed if you have a higher chance for falling. Ask your doctor about this.  Where can I learn more?   American Academy of [employer]icians  https://familydoctor.org/zhpiz-phd-hp-lower-your-risk/   NHS  https://www.nhs.uk/conditions/falls/prevention/   Last Reviewed Date   2021-06-07  Consumer Information Use and Disclaimer   This information is not specific medical advice and does not replace information you receive from your health care provider. This is only a brief summary of general information. It does NOT include all information about conditions, illnesses, injuries, tests, procedures, treatments, therapies, discharge instructions or life-style choices that may apply to you. You must talk with your health care provider for complete information about your health and treatment options. This information should not be used to decide whether or not to accept your health care providers advice, instructions or recommendations. Only your health care provider has the knowledge and training to provide advice that is right for you.  Copyright   Copyright © 2021 UpToDate, Inc. and its affiliates and/or  "licensors. All rights reserved.    Patient Education       Urinary Incontinence   The Basics   Written by the doctors and editors at [facility]   What is urinary incontinence? -- "Urinary incontinence" is the medical term for when a person leaks urine or loses bladder control. Often it is called just "incontinence."  Incontinence is a very common problem. If you have this problem, there are treatments that can help. There are also things you can do on your own to stop or reduce urine leakage so you don't have to "just live with it."   What are the symptoms of incontinence? -- There are different types of incontinence. Each causes different symptoms. The 3 most common types are:  Stress incontinence - People with stress incontinence leak urine when they laugh, cough, sneeze, or do anything that "stresses" the belly. Stress incontinence is most common in women, especially those who have had a baby.  Urgency incontinence - People with urgency incontinence feel a strong need to urinate all of a sudden. Often the "urge" is so strong that they can't make it to the bathroom in time. "Overactive bladder" is another term for having a sudden, frequent urge to urinate. People with overactive bladder might or might not actually leak urine.  Mixed incontinence - People with mixed incontinence have symptoms of both stress and urgency incontinence.  Is there anything I can do on my own to feel better? -- Yes. Here are some steps that can help reduce urine leaks:  Reduce the amount of liquid you drink, especially a few hours before bed.  Cut down on any foods or drinks that make your symptoms worse. Some people find that alcohol, caffeine, or spicy or acidic foods irritate the bladder.  If you are overweight, lose weight.  If you have diabetes, keep your blood sugar as close to normal as possible.  If you take medicines called diuretics, plan ahead. These medicines increase the need to urinate. Take them when you know you will be " "near a bathroom for a few hours.  These techniques can also help improve bladder control:  Bladder retraining - During bladder retraining, you go to the bathroom at scheduled times. For instance, you might decide that you will go every hour. You would make yourself go every hour, even if you didn't feel like you needed to. And you would try to wait until a whole hour had passed if you needed to go sooner. Then, once you got used to going every hour, you would increase the amount of time you waited in between bathroom visits. Over time, you might be able to "retrain" your bladder to wait 3 or 4 hours between bathroom visits.  Pelvic muscle exercises - Pelvic muscle exercises strengthen the muscles that control the flow of urine. These exercises can help, but people often do them wrong. Ask your doctor or nurse how to do them right. Your doctor might suggest working with a physical therapist who has special training in these exercises.  Should I see my doctor or nurse? -- Yes. Your doctor or nurse can find out what might be causing your incontinence. They can also suggest ways to relieve the problem.  When you speak to your doctor or nurse, ask if any of the medicines you take could be causing your symptoms. Some medicines can cause incontinence or make it worse.  How is incontinence treated? -- The treatment options differ depending on what type of incontinence you have, and whether you are a man or a woman. Some of the treatment options include:  Medicines to relax the bladder  Surgery to repair the tissues that support the bladder or to improve the flow of urine  Electrical stimulation of the nerves that relax the bladder  What will my life be like? -- Many people with incontinence can regain bladder control or at least reduce the amount of leakage they have. The key is to speak up about it to your doctor or nurse. Then work with them to find an approach that helps you.  All topics are updated as new evidence " becomes available and our peer review process is complete.  This topic retrieved from [employer] on: Sep 21, 2021.  Topic 76758 Version 12.0  Release: 29.4.2 - C29.263  © 2021 UpToDate, Inc. and/or its affiliates. All rights reserved.  figure 1: Location of the bladder     This drawing shows the side view of a woman's body. The bladder is in front of the vagina. The urethra is the tube that carries urine from the bladder out of the body.  Graphic 074320 Version 1.0    Consumer Information Use and Disclaimer   This information is not specific medical advice and does not replace information you receive from your health care provider. This is only a brief summary of general information. It does NOT include all information about conditions, illnesses, injuries, tests, procedures, treatments, therapies, discharge instructions or life-style choices that may apply to you. You must talk with your health care provider for complete information about your health and treatment options. This information should not be used to decide whether or not to accept your health care provider's advice, instructions or recommendations. Only your health care provider has the knowledge and training to provide advice that is right for you. The use of this information is governed by the [employer] End User License Agreement, available at https://www.[employer]a/en/solutions/K[employer] Platform/about/jesse.The use of [employer] content is governed by the [employer] Terms of Use. ©2021 UpToDate, Inc. All rights reserved.  Copyright   © 2021 UpToDate, Inc. and/or its affiliates. All rights reserved.

## 2024-11-21 LAB — BACTERIA UR CULT: NORMAL

## 2024-12-27 ENCOUNTER — OFFICE VISIT (OUTPATIENT)
Dept: BEHAVIORAL HEALTH | Facility: CLINIC | Age: 64
End: 2024-12-27
Payer: MEDICARE

## 2024-12-27 VITALS
HEART RATE: 81 BPM | BODY MASS INDEX: 50.02 KG/M2 | HEIGHT: 64 IN | SYSTOLIC BLOOD PRESSURE: 128 MMHG | TEMPERATURE: 98 F | WEIGHT: 293 LBS | OXYGEN SATURATION: 94 % | DIASTOLIC BLOOD PRESSURE: 82 MMHG

## 2024-12-27 DIAGNOSIS — F33.42 RECURRENT MAJOR DEPRESSIVE DISORDER, IN FULL REMISSION: Primary | ICD-10-CM

## 2024-12-27 DIAGNOSIS — G47.00 INSOMNIA, UNSPECIFIED TYPE: ICD-10-CM

## 2024-12-27 DIAGNOSIS — F41.1 GENERALIZED ANXIETY DISORDER: ICD-10-CM

## 2024-12-27 RX ORDER — ESCITALOPRAM OXALATE 20 MG/1
20 TABLET ORAL DAILY
Qty: 30 TABLET | Refills: 1 | Status: SHIPPED | OUTPATIENT
Start: 2024-12-27

## 2024-12-27 RX ORDER — TRAZODONE HYDROCHLORIDE 50 MG/1
50 TABLET ORAL NIGHTLY PRN
Qty: 30 TABLET | Refills: 1 | Status: SHIPPED | OUTPATIENT
Start: 2024-12-27

## 2024-12-27 NOTE — PROGRESS NOTES
"PSYCHIATRIC FOLLOW-UP VISIT NOTE    Chief Complaint   Patient presents with    Medication Management     2 month medication management         History of Present Illness  64 y.o. year old White female with hx of MDD, ELIANA, and insomnia seen today for follow-up appointment and medication management.  Patient reports having a good holiday season, but did have some conflicting emotions.  Still finds it difficult to celebrate the holidays without her , who has been going for 6 years.  They are also other anniversaries that make this a difficult time for her.  Also states that her family does not gather has a large group like they did before her has been passed away.  She was still able to have a good [place] with some of her extended family.  Denies any recent depression.  Reports some increased anxiety with all of the business of the holidays, but denies any panic attacks or any severe symptoms.  She feels this will improve as the holiday season concludes.  Sleeping well at night and feels rested in the morning.  Denies any side effects from current medication regimen. Patient denies SI/HI. Denies hallucinations and does not appear to be responding to internal stimuli or be internally preoccupied. No manic symptoms noted.       Objective:     Vitals:  Vitals:    12/27/24 1243   BP: 128/82   BP Location: Right arm   Pulse: 81   Temp: 98.1 °F (36.7 °C)   TempSrc: Temporal   SpO2: (!) 94%   Weight: (!) 148.1 kg (326 lb 8 oz)   Height: 5' 4.02" (1.626 m)       Wt Readings from Last 3 Encounters:   12/27/24 1243 (!) 148.1 kg (326 lb 8 oz)   11/18/24 1406 (!) 149.2 kg (329 lb)   10/25/24 1258 (!) 149 kg (328 lb 7.8 oz)         Medication:    Current Outpatient Medications:     blood pressure test kit-large Kit, 1 each by Misc.(Non-Drug; Combo Route) route 2 (two) times daily as needed (htn)., Disp: 1 each, Rfl: 0    carvediloL (COREG) 6.25 MG tablet, Take 6.25 mg by mouth 2 (two) times daily., Disp: , Rfl:     " docusate sodium (COLACE) 100 MG capsule, Take 100 mg by mouth Daily., Disp: , Rfl:     ELIQUIS 5 mg Tab, Take 5 mg by mouth 2 (two) times daily., Disp: , Rfl:     enalapril (VASOTEC) 20 MG tablet, Take 1 tablet by mouth once daily., Disp: , Rfl:     furosemide (LASIX) 40 MG tablet, Take 40 mg by mouth daily as needed., Disp: , Rfl:     nitroGLYCERIN (NITROSTAT) 0.4 MG SL tablet, Place 0.4 mg under the tongue as needed., Disp: , Rfl:     oxybutynin (DITROPAN-XL) 5 MG TR24, Take 1 tablet (5 mg total) by mouth once daily., Disp: 30 tablet, Rfl: 11    ranolazine (RANEXA) 500 MG Tb12, Take 500 mg by mouth 2 (two) times daily., Disp: , Rfl:     rosuvastatin (CRESTOR) 10 MG tablet, Take 1 tablet (10 mg total) by mouth once daily., Disp: 30 tablet, Rfl: 11    EScitalopram oxalate (LEXAPRO) 20 MG tablet, Take 1 tablet (20 mg total) by mouth once daily., Disp: 30 tablet, Rfl: 1    traZODone (DESYREL) 50 MG tablet, Take 1 tablet (50 mg total) by mouth nightly as needed for Insomnia., Disp: 30 tablet, Rfl: 1       Significant Labs: - none at this time    Significant Imaging: - none at this time    Physical Exam  Vitals and nursing note reviewed.   Constitutional:       General: She is awake.      Appearance: Normal appearance.   Musculoskeletal:      Comments: Ambulates with walker   Neurological:      Mental Status: She is alert.   Psychiatric:         Attention and Perception: Attention and perception normal. She does not perceive auditory or visual hallucinations.         Mood and Affect: Affect normal. Mood is anxious.         Speech: Speech normal.         Behavior: Behavior is cooperative.         Thought Content: Thought content does not include homicidal or suicidal ideation.         Cognition and Memory: Cognition and memory normal.          Review of Systems     Mental Status Exam:  Presentation:  - Appearance: 64 y.o. year old White female, appears stated age, appears Casually dressed and Well groomed  - Motility:  "Erect when standing, shuffled gait, uses walker, No EPS or Tremors, No psychomotor agitation or retardation appreciated  - Behavior: anxious, cooperative, maintains eye contact  Speech:  - Character/Organization: spontaneous, fluent, normal volume, normal rate, normal rhythm  Emotional State:  - Mood: "happy, anxious"   - Affect: congruent and anxious  Thought:  - Process: logical, linear, organized , goal-directed  - Preoccupations: no ruminations, rituals, or phobias appreciated  - Delusions: no persecutory, paranoid, or grandiose delusions appreciated  - Perception: denies AVH, not actively responding to internal stimuli  - SI/HI: denies/denies  Sensorium & Intellect:  - Sensorium: AAOx4  - Memory: intact to recent and remote events  - Attention/Concentration: good/good  - Insight/Judgement: good/good    Gait: uses walker  MSK:no rigidity appreciated    All other systems without acute issues unless noted in HPI      Assessment/Plan      ICD-10-CM ICD-9-CM    1. Recurrent major depressive disorder, in full remission  F33.42 296.36 traZODone (DESYREL) 50 MG tablet      EScitalopram oxalate (LEXAPRO) 20 MG tablet      2. Generalized anxiety disorder  F41.1 300.02 EScitalopram oxalate (LEXAPRO) 20 MG tablet      3. Insomnia, unspecified type  G47.00 780.52 traZODone (DESYREL) 50 MG tablet         Continue other medications without change    Potential side effects and risks vs benefits of current treatment plan reviewed with patient. Applicable black box warnings reviewed. Encouraged patient not to alter dosages or abruptly discontinue medications without contacting prescriber first, due to risk of worsening symptoms and decompensation of mental status. Warned of risks associated with herbal remedies and supplements while taking psychotropic medications and of the need to consult prescriber prior to adding any of these to current regimen. Patient should abstain from abuse of alcohol, prescription medications, and " illicit drugs. Reviewed when to contact clinic and/or seek emergent care, such as but not limited to, onset/worsening SI/HI, hallucinations, delusions, manic symptoms. Pt verbalized understanding and agreement of these warnings/recommendations and verbally consented to treatment plan.      Follow up in about 2 months (around 2/27/2025) for Medication Management.        Vic Song, PMTRISHP

## 2025-02-07 ENCOUNTER — OFFICE VISIT (OUTPATIENT)
Dept: FAMILY MEDICINE | Facility: CLINIC | Age: 65
End: 2025-02-07
Payer: MEDICARE

## 2025-02-07 VITALS
DIASTOLIC BLOOD PRESSURE: 84 MMHG | HEIGHT: 64 IN | TEMPERATURE: 99 F | OXYGEN SATURATION: 95 % | SYSTOLIC BLOOD PRESSURE: 136 MMHG | BODY MASS INDEX: 50.02 KG/M2 | WEIGHT: 293 LBS | HEART RATE: 82 BPM

## 2025-02-07 DIAGNOSIS — M25.512 ACUTE PAIN OF LEFT SHOULDER: ICD-10-CM

## 2025-02-07 DIAGNOSIS — M54.16 LUMBAR RADICULOPATHY, ACUTE: Primary | ICD-10-CM

## 2025-02-07 PROCEDURE — 1160F RVW MEDS BY RX/DR IN RCRD: CPT | Mod: ,,, | Performed by: NURSE PRACTITIONER

## 2025-02-07 PROCEDURE — 3079F DIAST BP 80-89 MM HG: CPT | Mod: ,,, | Performed by: NURSE PRACTITIONER

## 2025-02-07 PROCEDURE — 3008F BODY MASS INDEX DOCD: CPT | Mod: ,,, | Performed by: NURSE PRACTITIONER

## 2025-02-07 PROCEDURE — 1125F AMNT PAIN NOTED PAIN PRSNT: CPT | Mod: ,,, | Performed by: NURSE PRACTITIONER

## 2025-02-07 PROCEDURE — 3075F SYST BP GE 130 - 139MM HG: CPT | Mod: ,,, | Performed by: NURSE PRACTITIONER

## 2025-02-07 PROCEDURE — 1159F MED LIST DOCD IN RCRD: CPT | Mod: ,,, | Performed by: NURSE PRACTITIONER

## 2025-02-07 PROCEDURE — 96372 THER/PROPH/DIAG INJ SC/IM: CPT | Mod: ,,, | Performed by: NURSE PRACTITIONER

## 2025-02-07 PROCEDURE — 99214 OFFICE O/P EST MOD 30 MIN: CPT | Mod: 25,,, | Performed by: NURSE PRACTITIONER

## 2025-02-07 RX ORDER — KETOROLAC TROMETHAMINE 30 MG/ML
60 INJECTION, SOLUTION INTRAMUSCULAR; INTRAVENOUS
Status: COMPLETED | OUTPATIENT
Start: 2025-02-07 | End: 2025-02-07

## 2025-02-07 RX ORDER — BETAMETHASONE SODIUM PHOSPHATE AND BETAMETHASONE ACETATE 3; 3 MG/ML; MG/ML
9 INJECTION, SUSPENSION INTRA-ARTICULAR; INTRALESIONAL; INTRAMUSCULAR; SOFT TISSUE
Status: COMPLETED | OUTPATIENT
Start: 2025-02-07 | End: 2025-02-07

## 2025-02-07 RX ADMIN — BETAMETHASONE SODIUM PHOSPHATE AND BETAMETHASONE ACETATE 9 MG: 3; 3 INJECTION, SUSPENSION INTRA-ARTICULAR; INTRALESIONAL; INTRAMUSCULAR; SOFT TISSUE at 03:02

## 2025-02-07 RX ADMIN — KETOROLAC TROMETHAMINE 60 MG: 30 INJECTION, SOLUTION INTRAMUSCULAR; INTRAVENOUS at 03:02

## 2025-02-07 NOTE — PROGRESS NOTES
Patient ID: Ruchi Sam  : 1960    Chief Complaint: Leg Pain and Shoulder Pain    Allergies: Patient is allergic to hydrocodone, cephalexin, erythromycin, and metronidazole.     Subjective :  The patient is a 64 y.o. White female who presents to clinic for follow up on Leg Pain and Shoulder Pain   History of Present Illness    HPI:  Patient reports severe right leg pain for the past 2 weeks, attributed to sciatic nerve issues. The pain is excruciating, originating from the upper thigh and radiating down to the foot, causing significant difficulty walking and preventing weight-bearing on the affected leg. The pain is particularly intense in the upper thigh area, with sensations of tenderness and constriction. She has difficulty extending her leg due to pain and flexes her knee towards her chest at night to manage discomfort.    The condition has significantly impacted her mobility and daily activities. She uses a walker for household mobility, propelling herself while seated to alleviate leg pressure. She is concerned about falling, particularly when using her shower chair, necessitating cautious movements.    Patient denies any recent strain or fall prior to symptom onset. She reports favoring her right leg due to knee issues and now has pain in her left leg and knee as well. Patient has a history of bladder dysfunction, utilizing a portable toilet, but denies any new loss of bowel or bladder function related to the current pain.    Patient reports a new issue with her left shoulder, which was injured in a fall approximately 2 years ago, resulting in a torn bicep requiring surgery. The shoulder recently had 2 episodes of dislocation upon waking up one morning a few weeks ago, and since then, she has been unable to move it or perform her usual exercises. Patient is concerned about potential cardiac involvement related to the shoulder pain, though she seems uncertain.    MEDICAL HISTORY:  Patient has a  history of sciatic nerve issue and bladder dysfunction.    FAMILY HISTORY:  Family history is significant for the patient's daughter who is scheduled for knee surgery next Thursday.    SURGICAL HISTORY:  Patient underwent left shoulder surgery approximately 2 years ago for a torn bicep following a fall. The procedure was performed by Dr. August Overton, an orthopedic surgeon located on [facility] in [place].    IMAGING:  She is scheduled for X-rays of her lower back and left shoulder today (2025). The results of these imaging studies are currently pending.      ROS:  ROS as indicated in HPI.           Social History:  reports that she has never smoked. She has never been exposed to tobacco smoke. She has never used smokeless tobacco. She reports that she does not drink alcohol and does not use drugs.    Past Medical History:  has a past medical history of Anxiety disorder, unspecified, Bilateral leg edema, Coronary artery disease, Depression, CANDELARIO (dyspnea on exertion), Heart murmur, Hypertension, Mitral murmur, Obesity, unspecified, Obstructive sleep apnea, Paroxysmal atrial fibrillation, Sleep apnea, and Urinary incontinence.    Current Medications:  Current Outpatient Medications   Medication Instructions    blood pressure test kit-large Kit 1 each, Misc.(Non-Drug; Combo Route), 2 times daily PRN    carvediloL (COREG) 6.25 mg, 2 times daily    docusate sodium (COLACE) 100 mg, Daily    ELIQUIS 5 mg, 2 times daily    enalapril (VASOTEC) 20 MG tablet 1 tablet, Daily    EScitalopram oxalate (LEXAPRO) 20 mg, Oral, Daily    furosemide (LASIX) 40 mg, Daily PRN    nitroGLYCERIN (NITROSTAT) 0.4 mg, As needed (PRN)    oxybutynin (DITROPAN-XL) 5 mg, Oral, Daily    ranolazine (RANEXA) 500 mg, 2 times daily    rosuvastatin (CRESTOR) 10 mg, Oral, Daily    traZODone (DESYREL) 50 mg, Oral, Nightly PRN         Visit Vitals  /84 (BP Location: Right arm, Patient Position: Sitting)   Pulse 82   Temp 98.6 °F (37 °C)  "(Temporal)   Ht 5' 4.02" (1.626 m)   Wt (!) 147.9 kg (326 lb)   SpO2 95%   BMI 55.93 kg/m²       Physical Exam  Vitals reviewed.   Constitutional:       Appearance: Normal appearance. She is obese.   Cardiovascular:      Heart sounds: Normal heart sounds.   Pulmonary:      Breath sounds: Normal breath sounds.   Musculoskeletal:      Left shoulder: Tenderness and bony tenderness present. No effusion. Decreased range of motion. Decreased strength.      Lumbar back: Decreased range of motion (secondary to pain). Positive right straight leg raise test. Negative left straight leg raise test.   Skin:     General: Skin is warm and dry.   Neurological:      Mental Status: She is oriented to person, place, and time.            Assessment & Plan:  1. Lumbar radiculopathy, acute  -     X-Ray Lumbar Spine 2 Or 3 Views; Future; Expected date: 02/07/2025  -     ketorolac injection 60 mg  -     betamethasone acetate-betamethasone sodium phosphate injection 9 mg    2. Acute pain of left shoulder  -     X-Ray Shoulder 2 or More Views Left; Future; Expected date: 02/07/2025         Assessment & Plan    M54.16 Lumbar radiculopathy, acute  M25.512 Acute pain of left shoulder    IMPRESSION:   Suspected sciatica based on patient's symptoms and history   Will obtain x-ray of lower back to guide further treatment   Recommend pain management with Toradol and steroid injection today for acute relief   Considered MRI for left shoulder, pending x-ray results   Noted potential need for higher level of care if neurological symptoms progress    M54.16 LUMBAR RADICULOPATHY, ACUTE:   Explained potential sciatic nerve involvement in current symptoms.   Discussed the importance of monitoring for progression of neurological symptoms.   Instructed the patient to apply heat to the affected area for 15-20 minutes multiple times daily.   Administered Toradol 60 mg injection for acute pain relief.   Administered Celestone 9 mg injection for inflammation.   " Ordered x-ray of the lumbar spine.   Advised the patient to contact the office if symptoms progress, develop sensory loss, motor loss, or loss of bowel or bladder function over the weekend.   Instructed the patient to present to the Emergency Department for further evaluation if neurological symptoms worsen.    M25.512 ACUTE PAIN OF LEFT SHOULDER:   Administered Toradol 60 mg injection for acute pain relief.   Administered Celestone 9 mg injection for inflammation.   Ordered x-ray of the left shoulder.   Will likely need MRI to assess for rotator injury         Future Appointments   Date Time Provider Department Center   3/3/2025  1:00 PM Vic Song, PMHNP [facility]   11/14/2025  8:10 AM LAB, [facility] LABORATORY DRAW STATION [facility] JOSE ALFREDO [facility]   11/21/2025 11:00 AM Janene Ford FNP-C [facility]       Follow up if symptoms worsen or fail to improve, for Keep appointment as scheduled. Call sooner if needed.    BERENICE Christine    Lab Frequency Next Occurrence   Ambulatory referral/consult to Sleep Disorders Once 03/18/2024   Ambulatory referral/consult to Psychiatry Once 06/18/2024            This note was generated with the assistance of ambient listening technology. Verbal consent was obtained by the patient and accompanying visitor(s) for the recording of patient appointment to facilitate this note. I attest to having reviewed and edited the generated note for accuracy, though some syntax or spelling errors may persist. Please contact the author of this note for any clarification.

## 2025-02-10 ENCOUNTER — TELEPHONE (OUTPATIENT)
Dept: FAMILY MEDICINE | Facility: CLINIC | Age: 65
End: 2025-02-10
Payer: MEDICARE

## 2025-02-10 DIAGNOSIS — M25.512 ACUTE PAIN OF LEFT SHOULDER: Primary | ICD-10-CM

## 2025-02-10 NOTE — TELEPHONE ENCOUNTER
----- Message from BERENICE Hernandez sent at 2/10/2025 12:18 PM CST -----  Xray indicates that you may possibly have a problem with the rotator cuff of the shoulder. We should get an MRI for further evaluation. (Ordered)

## 2025-02-10 NOTE — TELEPHONE ENCOUNTER
"I called and notified pt. she verbalized understanding. She said "Janene must have healed me" because she hasn't had any more pain since the 2 injections. She sort of feels a little limited range of motion in her shoulder but denies pain.     "

## 2025-02-10 NOTE — TELEPHONE ENCOUNTER
----- Message from BERENICE Hernandez sent at 2/10/2025 12:15 PM CST -----  Xray shows degenerative changes of the spine. No change in our therapy plan.

## 2025-02-19 ENCOUNTER — HOSPITAL ENCOUNTER (OUTPATIENT)
Dept: RADIOLOGY | Facility: HOSPITAL | Age: 65
Discharge: HOME | End: 2025-02-19
Attending: NURSE PRACTITIONER
Payer: MEDICARE

## 2025-02-19 ENCOUNTER — RESULTS FOLLOW-UP (OUTPATIENT)
Dept: FAMILY MEDICINE | Facility: CLINIC | Age: 65
End: 2025-02-19
Payer: MEDICARE

## 2025-02-19 DIAGNOSIS — R52 PAIN: Primary | ICD-10-CM

## 2025-02-19 DIAGNOSIS — S46.012D TRAUMATIC TEAR OF LEFT ROTATOR CUFF, UNSPECIFIED TEAR EXTENT, SUBSEQUENT ENCOUNTER: ICD-10-CM

## 2025-02-19 DIAGNOSIS — M25.512 ACUTE PAIN OF LEFT SHOULDER: ICD-10-CM

## 2025-02-19 PROCEDURE — 73221 MRI JOINT UPR EXTREM W/O DYE: CPT | Mod: TC,LT

## 2025-02-20 RX ORDER — NAPROXEN 500 MG/1
500 TABLET ORAL DAILY PRN
Qty: 60 TABLET | Refills: 0 | Status: SHIPPED | OUTPATIENT
Start: 2025-02-20

## 2025-02-20 NOTE — TELEPHONE ENCOUNTER
----- Message from BERENICE Hernandez sent at 2/19/2025  4:50 PM CST -----  MRI of the shoulder is back and she has apparently retorn the tendons of the shoulder.  Ask her who has seen with ortho or who she wants us to send her to to repair this shoulder again  ----- Message -----  From: Interface, Rad Results In  Sent: 2/19/2025   2:57 PM CST  To: BERENICE Guillen

## 2025-02-20 NOTE — TELEPHONE ENCOUNTER
She said that she previously saw Dr. August Overton so she already called them. She is scheduled for 3/12 @ 10:30am. She said that the naproxen rx that she has is from 2021 so she would like a new rx. She said she only takes it once a day and only when needed. I forwarded the results to Dr. Overton. Pt was instructed to bring the MRI disc to his appointment.

## 2025-02-26 DIAGNOSIS — F41.1 GENERALIZED ANXIETY DISORDER: ICD-10-CM

## 2025-02-26 DIAGNOSIS — G47.00 INSOMNIA, UNSPECIFIED TYPE: ICD-10-CM

## 2025-02-26 DIAGNOSIS — F33.42 RECURRENT MAJOR DEPRESSIVE DISORDER, IN FULL REMISSION: ICD-10-CM

## 2025-02-26 RX ORDER — TRAZODONE HYDROCHLORIDE 50 MG/1
TABLET ORAL
Qty: 30 TABLET | Refills: 1 | Status: SHIPPED | OUTPATIENT
Start: 2025-02-26

## 2025-02-26 RX ORDER — ESCITALOPRAM OXALATE 20 MG/1
20 TABLET ORAL
Qty: 30 TABLET | Refills: 1 | Status: SHIPPED | OUTPATIENT
Start: 2025-02-26

## 2025-03-03 ENCOUNTER — OFFICE VISIT (OUTPATIENT)
Dept: BEHAVIORAL HEALTH | Facility: CLINIC | Age: 65
End: 2025-03-03
Payer: MEDICARE

## 2025-03-03 VITALS
SYSTOLIC BLOOD PRESSURE: 120 MMHG | HEIGHT: 64 IN | DIASTOLIC BLOOD PRESSURE: 74 MMHG | OXYGEN SATURATION: 97 % | TEMPERATURE: 98 F | BODY MASS INDEX: 50.02 KG/M2 | HEART RATE: 81 BPM | WEIGHT: 293 LBS

## 2025-03-03 DIAGNOSIS — F33.42 RECURRENT MAJOR DEPRESSIVE DISORDER, IN FULL REMISSION: Primary | ICD-10-CM

## 2025-03-03 DIAGNOSIS — G47.00 INSOMNIA, UNSPECIFIED TYPE: ICD-10-CM

## 2025-03-03 DIAGNOSIS — F41.1 GENERALIZED ANXIETY DISORDER: ICD-10-CM

## 2025-03-03 PROCEDURE — 3078F DIAST BP <80 MM HG: CPT | Mod: CPTII,,, | Performed by: NURSE PRACTITIONER

## 2025-03-03 PROCEDURE — 99214 OFFICE O/P EST MOD 30 MIN: CPT | Mod: ,,, | Performed by: NURSE PRACTITIONER

## 2025-03-03 PROCEDURE — 3074F SYST BP LT 130 MM HG: CPT | Mod: CPTII,,, | Performed by: NURSE PRACTITIONER

## 2025-03-03 PROCEDURE — 1160F RVW MEDS BY RX/DR IN RCRD: CPT | Mod: CPTII,,, | Performed by: NURSE PRACTITIONER

## 2025-03-03 PROCEDURE — 1159F MED LIST DOCD IN RCRD: CPT | Mod: CPTII,,, | Performed by: NURSE PRACTITIONER

## 2025-03-03 PROCEDURE — 3008F BODY MASS INDEX DOCD: CPT | Mod: CPTII,,, | Performed by: NURSE PRACTITIONER

## 2025-03-03 PROCEDURE — 1125F AMNT PAIN NOTED PAIN PRSNT: CPT | Mod: CPTII,,, | Performed by: NURSE PRACTITIONER

## 2025-03-03 RX ORDER — ESCITALOPRAM OXALATE 20 MG/1
20 TABLET ORAL DAILY
Qty: 30 TABLET | Refills: 2 | Status: SHIPPED | OUTPATIENT
Start: 2025-03-03

## 2025-03-03 RX ORDER — TRAZODONE HYDROCHLORIDE 100 MG/1
50-100 TABLET ORAL NIGHTLY PRN
Qty: 30 TABLET | Refills: 2 | Status: SHIPPED | OUTPATIENT
Start: 2025-03-03

## 2025-03-03 NOTE — PROGRESS NOTES
"PSYCHIATRIC FOLLOW-UP VISIT NOTE    Chief Complaint   Patient presents with    Medication Management     Medication management         History of Present Illness  65 y.o. year old White female with hx of MDD, tod, and insomnia seen today for follow-up appointment and medication management.  Patient reports some normative anxiety regarding her left shoulder, which we will require surgery.  She has a appointment on March 12th to discuss surgical options and schedule that procedure.  Otherwise she is without acute complaints.  Her family's doing well.  Sleep is sometimes fractured during the night but she is falling asleep easily.  Denies any side effects from current medication regimen. Patient denies SI/HI. Denies hallucinations and does not appear to be responding to internal stimuli or be internally preoccupied. No manic symptoms noted.       Objective:     Vitals:  Vitals:    03/03/25 1251   BP: 120/74   BP Location: Right arm   Pulse: 81   Temp: 97.9 °F (36.6 °C)   TempSrc: Temporal   SpO2: 97%   Weight: (!) 148.9 kg (328 lb 4.2 oz)   Height: 5' 4.02" (1.626 m)       Wt Readings from Last 3 Encounters:   03/03/25 1251 (!) 148.9 kg (328 lb 4.2 oz)   02/07/25 1424 (!) 147.9 kg (326 lb)   12/27/24 1243 (!) 148.1 kg (326 lb 8 oz)         Medication:  Current Medications[1]       Significant Labs: - none at this time    Significant Imaging: - none at this time    Physical Exam  Vitals and nursing note reviewed.   Constitutional:       General: She is awake.      Appearance: Normal appearance.   Musculoskeletal:      Comments: Ambulates with walker   Neurological:      Mental Status: She is alert.   Psychiatric:         Attention and Perception: Attention and perception normal. She does not perceive auditory or visual hallucinations.         Mood and Affect: Affect normal.         Speech: Speech normal.         Behavior: Behavior is cooperative.         Thought Content: Thought content does not include homicidal or " "suicidal ideation.         Cognition and Memory: Cognition and memory normal.          Review of Systems     Mental Status Exam:  Presentation:  - Appearance: 65 y.o. year old White female, appears stated age, appears Casually dressed and Well groomed  - Motility: Erect when standing, Steady gait, No EPS or Tremors, No psychomotor agitation or retardation appreciated  - Behavior: calm, cooperative, good eye contact  Speech:  - Character/Organization: spontaneous, fluent, normal volume, normal rate, normal rhythm  Emotional State:  - Mood: "happy/anxious"   - Affect: congruent and appropriate  Thought:  - Process: logical, linear, organized , goal-directed  - Preoccupations: no ruminations, rituals, or phobias appreciated  - Delusions: no persecutory, paranoid, or grandiose delusions appreciated  - Perception: denies AVH, not actively responding to internal stimuli  - SI/HI: denies/denies  Sensorium & Intellect:  - Sensorium: AAOx4  - Memory: intact to recent and remote events  - Attention/Concentration: good/good  - Insight/Judgement: good/good    Gait: normal swing and stance  MSK:no rigidity appreciated    All other systems without acute issues unless noted in HPI      Assessment/Plan      ICD-10-CM ICD-9-CM    1. Recurrent major depressive disorder, in full remission  F33.42 296.36 EScitalopram oxalate (LEXAPRO) 20 MG tablet      traZODone (DESYREL) 100 MG tablet      2. Generalized anxiety disorder  F41.1 300.02 EScitalopram oxalate (LEXAPRO) 20 MG tablet      3. Insomnia, unspecified type  G47.00 780.52 traZODone (DESYREL) 100 MG tablet         Continue current medications without change    Potential side effects and risks vs benefits of current treatment plan reviewed with patient. Applicable black box warnings reviewed. Encouraged patient not to alter dosages or abruptly discontinue medications without contacting prescriber first, due to risk of worsening symptoms and decompensation of mental status. Warned " of risks associated with herbal remedies and supplements while taking psychotropic medications and of the need to consult prescriber prior to adding any of these to current regimen. Patient should abstain from abuse of alcohol, prescription medications, and illicit drugs. Reviewed when to contact clinic and/or seek emergent care, such as but not limited to, onset/worsening SI/HI, hallucinations, delusions, manic symptoms. Pt verbalized understanding and agreement of these warnings/recommendations and verbally consented to treatment plan.      Follow up in about 3 months (around 6/3/2025) for Medication Management.        Vic Song, PMHNP         [1]   Current Outpatient Medications:     blood pressure test kit-large Kit, 1 each by Misc.(Non-Drug; Combo Route) route 2 (two) times daily as needed (htn)., Disp: 1 each, Rfl: 0    calcium carbonate/vitamin D3 (CALCIUM WITH VITAMIN D3 ORAL), Take 1 each by mouth Daily., Disp: , Rfl:     carvediloL (COREG) 6.25 MG tablet, Take 6.25 mg by mouth 2 (two) times daily., Disp: , Rfl:     docusate sodium (COLACE) 100 MG capsule, Take 100 mg by mouth Daily., Disp: , Rfl:     ELIQUIS 5 mg Tab, Take 5 mg by mouth 2 (two) times daily., Disp: , Rfl:     enalapril (VASOTEC) 20 MG tablet, Take 1 tablet by mouth once daily., Disp: , Rfl:     furosemide (LASIX) 40 MG tablet, Take 40 mg by mouth daily as needed., Disp: , Rfl:     naproxen (NAPROSYN) 500 MG tablet, Take 1 tablet (500 mg total) by mouth daily as needed (pain). Take 1 tablet by mouth daily with meals as needed for pain., Disp: 60 tablet, Rfl: 0    nitroGLYCERIN (NITROSTAT) 0.4 MG SL tablet, Place 0.4 mg under the tongue as needed., Disp: , Rfl:     oxybutynin (DITROPAN-XL) 5 MG TR24, Take 1 tablet (5 mg total) by mouth once daily., Disp: 30 tablet, Rfl: 11    ranolazine (RANEXA) 500 MG Tb12, Take 500 mg by mouth 2 (two) times daily., Disp: , Rfl:     rosuvastatin (CRESTOR) 10 MG tablet, Take 1 tablet (10 mg total)  by mouth once daily., Disp: 30 tablet, Rfl: 11    EScitalopram oxalate (LEXAPRO) 20 MG tablet, Take 1 tablet (20 mg total) by mouth once daily., Disp: 30 tablet, Rfl: 2    traZODone (DESYREL) 100 MG tablet, Take 0.5-1 tablets ( mg total) by mouth nightly as needed for Insomnia., Disp: 30 tablet, Rfl: 2

## 2025-05-05 NOTE — TELEPHONE ENCOUNTER
----- Message from Romelia Johnson sent at 1/31/2023  9:07 AM CST -----  Regarding: call back   Pt called wanting to know if she can get a letter to excuse her from jury duty, because she has to use a walker to get around and she doesn't feel like she will be able to attend that, just  in too much pain.         [phone]     Counseled on healthy diet, sleep, exercise.  Patient will target 6 to 8 hours of sleep nightly, 150 minutes moderate intensity exercise 3 times weekly, robust diet with plenty of fruits vegetables, lean proteins.  Avoid sugary processed fatty foods.

## 2025-05-07 ENCOUNTER — TELEPHONE (OUTPATIENT)
Dept: FAMILY MEDICINE | Facility: CLINIC | Age: 65
End: 2025-05-07
Payer: MEDICARE

## 2025-05-07 NOTE — TELEPHONE ENCOUNTER
I called [facility] and spoke with Nasreen. I explained to her that we didn't receive an order. She verified the fax number and faxed it again. Once order received, I placed it for Janene to sign.  I then called and left a detailed voicemail notifying pt.    No

## 2025-05-13 ENCOUNTER — TELEPHONE (OUTPATIENT)
Dept: FAMILY MEDICINE | Facility: CLINIC | Age: 65
End: 2025-05-13
Payer: MEDICARE

## 2025-05-13 NOTE — TELEPHONE ENCOUNTER
I called and left a voicemail notifying pt that we did receive the order and that it would be signed and faxed back this afternoon.

## 2025-07-01 ENCOUNTER — OFFICE VISIT (OUTPATIENT)
Dept: BEHAVIORAL HEALTH | Facility: CLINIC | Age: 65
End: 2025-07-01
Payer: MEDICARE

## 2025-07-01 VITALS
WEIGHT: 293 LBS | OXYGEN SATURATION: 95 % | TEMPERATURE: 98 F | HEIGHT: 64 IN | BODY MASS INDEX: 50.02 KG/M2 | DIASTOLIC BLOOD PRESSURE: 72 MMHG | HEART RATE: 85 BPM | SYSTOLIC BLOOD PRESSURE: 120 MMHG

## 2025-07-01 DIAGNOSIS — F41.1 GENERALIZED ANXIETY DISORDER: ICD-10-CM

## 2025-07-01 DIAGNOSIS — G47.00 INSOMNIA, UNSPECIFIED TYPE: ICD-10-CM

## 2025-07-01 DIAGNOSIS — F33.42 RECURRENT MAJOR DEPRESSIVE DISORDER, IN FULL REMISSION: Primary | ICD-10-CM

## 2025-07-01 PROCEDURE — 1159F MED LIST DOCD IN RCRD: CPT | Mod: ,,, | Performed by: NURSE PRACTITIONER

## 2025-07-01 PROCEDURE — 1160F RVW MEDS BY RX/DR IN RCRD: CPT | Mod: ,,, | Performed by: NURSE PRACTITIONER

## 2025-07-01 PROCEDURE — 4010F ACE/ARB THERAPY RXD/TAKEN: CPT | Mod: ,,, | Performed by: NURSE PRACTITIONER

## 2025-07-01 PROCEDURE — 99214 OFFICE O/P EST MOD 30 MIN: CPT | Mod: ,,, | Performed by: NURSE PRACTITIONER

## 2025-07-01 PROCEDURE — 3008F BODY MASS INDEX DOCD: CPT | Mod: ,,, | Performed by: NURSE PRACTITIONER

## 2025-07-01 PROCEDURE — 3078F DIAST BP <80 MM HG: CPT | Mod: ,,, | Performed by: NURSE PRACTITIONER

## 2025-07-01 PROCEDURE — 3074F SYST BP LT 130 MM HG: CPT | Mod: ,,, | Performed by: NURSE PRACTITIONER

## 2025-07-01 PROCEDURE — 1125F AMNT PAIN NOTED PAIN PRSNT: CPT | Mod: ,,, | Performed by: NURSE PRACTITIONER

## 2025-07-01 RX ORDER — ESCITALOPRAM OXALATE 20 MG/1
20 TABLET ORAL DAILY
Qty: 30 TABLET | Refills: 0 | Status: SHIPPED | OUTPATIENT
Start: 2025-07-01

## 2025-07-01 RX ORDER — TRAZODONE HYDROCHLORIDE 50 MG/1
50 TABLET ORAL NIGHTLY PRN
Qty: 30 TABLET | Refills: 0 | Status: SHIPPED | OUTPATIENT
Start: 2025-07-01

## 2025-07-01 NOTE — PROGRESS NOTES
"PSYCHIATRIC FOLLOW-UP VISIT NOTE    Chief Complaint   Patient presents with    Medication Management     Medication management         History of Present Illness  65 y.o. year old White female with hx of MDD, tod, and insomnia seen today for follow-up appointment and medication management.  Patient reports that she is having a bad week.  She has had some significant discord with her daughter recently and they have not been in much contact.  Her great granddaughter is also moving to [place] in about 2 weeks.  She states they are very close and she is going to miss her very much.  Is having some anticipatory grief regarding this.  Has been relying on the rest of her support system.  She denies depression but is quite upset and tearful today.  States she also found out that she has only lost 4 lb in the past 3 months despite trying to diet quite strictly.  Her orthopedic surgeon told her that she needed to lose 30 lb to be able to have her left shoulder repaired, and she is now concerned that this will not happen.  Has an appointment with him on July 14th to discuss this.  She does not desire any medication changes today.  She is sleeping well at 50 mg of trazodone at bedtime. Patient denies SI/HI. Denies hallucinations and does not appear to be responding to internal stimuli or be internally preoccupied. No manic symptoms noted.       Objective:     Vitals:  Vitals:    07/01/25 1300   BP: 120/72   BP Location: Right arm   Patient Position: Sitting   Pulse: 85   Temp: 98.2 °F (36.8 °C)   TempSrc: Temporal   SpO2: 95%   Weight: (!) 147.6 kg (325 lb 6.4 oz)   Height: 5' 4.02" (1.626 m)       Wt Readings from Last 3 Encounters:   07/01/25 1300 (!) 147.6 kg (325 lb 6.4 oz)   03/03/25 1251 (!) 148.9 kg (328 lb 4.2 oz)   02/07/25 1424 (!) 147.9 kg (326 lb)         Medication:  Current Medications[1]       Significant Labs: - none at this time    Significant Imaging: - none at this time    Physical Exam     See " "HPI    Review of Systems     Mental Status Exam:  Presentation:  - Appearance: 65 y.o. year old White female, appears stated age, appears Casually dressed and Well groomed  - Motility: Erect when standing, Steady gait, No EPS or Tremors, No psychomotor agitation or retardation appreciated  - Behavior: cooperative, doesn't maintain eye contact, sullen  Speech:  - Character/Organization: spontaneous, fluent, normal volume, normal rate, normal rhythm  Emotional State:  - Mood: "sad, frustrated"   - Affect: congruent and tearful  Thought:  - Process: logical, linear, organized , goal-directed  - Preoccupations: guilt, family  - Delusions: no persecutory, paranoid, or grandiose delusions appreciated  - Perception: denies AVH, not actively responding to internal stimuli  - SI/HI: denies/denies  Sensorium & Intellect:  - Sensorium: AAOx4  - Memory: intact to recent and remote events  - Attention/Concentration: good/good  - Insight/Judgement: good/good    Gait: normal swing and stance  MSK:no rigidity appreciated    All other systems without acute issues unless noted in HPI      Assessment/Plan      ICD-10-CM ICD-9-CM    1. Recurrent major depressive disorder, in full remission  F33.42 296.36 EScitalopram oxalate (LEXAPRO) 20 MG tablet      traZODone (DESYREL) 50 MG tablet      2. Generalized anxiety disorder  F41.1 300.02 EScitalopram oxalate (LEXAPRO) 20 MG tablet      3. Insomnia, unspecified type  G47.00 780.52 traZODone (DESYREL) 50 MG tablet         Decrease trazodone to 50 mg daily at bedtime    Continue other medications without change    Potential side effects and risks vs benefits of current treatment plan reviewed with patient. Applicable black box warnings reviewed. Encouraged patient not to alter dosages or abruptly discontinue medications without contacting prescriber first, due to risk of worsening symptoms and decompensation of mental status. Warned of risks associated with herbal remedies and supplements " "while taking psychotropic medications and of the need to consult prescriber prior to adding any of these to current regimen. Patient should abstain from abuse of alcohol, prescription medications, and illicit drugs. Reviewed when to contact clinic and/or seek emergent care, such as but not limited to, onset/worsening SI/HI, hallucinations, delusions, manic symptoms. Pt verbalized understanding and agreement of these warnings/recommendations and verbally consented to treatment plan.      Portions of this note may have been created with voice recognition software. Occasional "wrong-word" or "sound-a-like" substitutions may have occurred due to the inherent limitations of voice recognition software. Please, read the note carefully and recognize, using context, where substitutions have occurred.      Follow up in about 4 weeks (around 7/29/2025) for Medication Management.        HIMANSHU Pace         [1]   Current Outpatient Medications:     blood pressure test kit-large Kit, 1 each by Misc.(Non-Drug; Combo Route) route 2 (two) times daily as needed (htn)., Disp: 1 each, Rfl: 0    calcium carbonate/vitamin D3 (CALCIUM WITH VITAMIN D3 ORAL), Take 1 each by mouth Daily., Disp: , Rfl:     carvediloL (COREG) 6.25 MG tablet, Take 6.25 mg by mouth 2 (two) times daily., Disp: , Rfl:     docusate sodium (COLACE) 100 MG capsule, Take 100 mg by mouth Daily., Disp: , Rfl:     ELIQUIS 5 mg Tab, Take 5 mg by mouth 2 (two) times daily., Disp: , Rfl:     enalapril (VASOTEC) 20 MG tablet, Take 1 tablet by mouth once daily., Disp: , Rfl:     furosemide (LASIX) 40 MG tablet, Take 40 mg by mouth daily as needed., Disp: , Rfl:     naproxen (NAPROSYN) 500 MG tablet, Take 1 tablet (500 mg total) by mouth daily as needed (pain). Take 1 tablet by mouth daily with meals as needed for pain., Disp: 60 tablet, Rfl: 0    nitroGLYCERIN (NITROSTAT) 0.4 MG SL tablet, Place 0.4 mg under the tongue as needed., Disp: , Rfl:     ranolazine " (RANEXA) 500 MG Tb12, Take 500 mg by mouth 2 (two) times daily., Disp: , Rfl:     rosuvastatin (CRESTOR) 10 MG tablet, Take 1 tablet (10 mg total) by mouth once daily., Disp: 30 tablet, Rfl: 11    EScitalopram oxalate (LEXAPRO) 20 MG tablet, Take 1 tablet (20 mg total) by mouth once daily., Disp: 30 tablet, Rfl: 0    oxybutynin (DITROPAN-XL) 5 MG TR24, Take 1 tablet (5 mg total) by mouth once daily. (Patient not taking: Reported on 7/1/2025), Disp: 30 tablet, Rfl: 11    traZODone (DESYREL) 50 MG tablet, Take 1 tablet (50 mg total) by mouth nightly as needed for Insomnia., Disp: 30 tablet, Rfl: 0

## 2025-07-29 ENCOUNTER — OFFICE VISIT (OUTPATIENT)
Dept: BEHAVIORAL HEALTH | Facility: CLINIC | Age: 65
End: 2025-07-29
Payer: MEDICARE

## 2025-07-29 VITALS
TEMPERATURE: 97 F | OXYGEN SATURATION: 94 % | BODY MASS INDEX: 50.02 KG/M2 | SYSTOLIC BLOOD PRESSURE: 118 MMHG | HEIGHT: 64 IN | WEIGHT: 293 LBS | HEART RATE: 86 BPM | DIASTOLIC BLOOD PRESSURE: 68 MMHG

## 2025-07-29 DIAGNOSIS — F41.1 GENERALIZED ANXIETY DISORDER: ICD-10-CM

## 2025-07-29 DIAGNOSIS — F33.42 RECURRENT MAJOR DEPRESSIVE DISORDER, IN FULL REMISSION: Primary | ICD-10-CM

## 2025-07-29 DIAGNOSIS — G47.00 INSOMNIA, UNSPECIFIED TYPE: ICD-10-CM

## 2025-07-29 PROCEDURE — 1160F RVW MEDS BY RX/DR IN RCRD: CPT | Mod: ,,, | Performed by: NURSE PRACTITIONER

## 2025-07-29 PROCEDURE — 3078F DIAST BP <80 MM HG: CPT | Mod: ,,, | Performed by: NURSE PRACTITIONER

## 2025-07-29 PROCEDURE — 3008F BODY MASS INDEX DOCD: CPT | Mod: ,,, | Performed by: NURSE PRACTITIONER

## 2025-07-29 PROCEDURE — 4010F ACE/ARB THERAPY RXD/TAKEN: CPT | Mod: ,,, | Performed by: NURSE PRACTITIONER

## 2025-07-29 PROCEDURE — 1125F AMNT PAIN NOTED PAIN PRSNT: CPT | Mod: ,,, | Performed by: NURSE PRACTITIONER

## 2025-07-29 PROCEDURE — 99214 OFFICE O/P EST MOD 30 MIN: CPT | Mod: ,,, | Performed by: NURSE PRACTITIONER

## 2025-07-29 PROCEDURE — 3074F SYST BP LT 130 MM HG: CPT | Mod: ,,, | Performed by: NURSE PRACTITIONER

## 2025-07-29 PROCEDURE — 1159F MED LIST DOCD IN RCRD: CPT | Mod: ,,, | Performed by: NURSE PRACTITIONER

## 2025-07-29 RX ORDER — ESCITALOPRAM OXALATE 20 MG/1
20 TABLET ORAL DAILY
Qty: 30 TABLET | Refills: 0 | Status: SHIPPED | OUTPATIENT
Start: 2025-07-29

## 2025-07-29 RX ORDER — TRAZODONE HYDROCHLORIDE 50 MG/1
50 TABLET ORAL NIGHTLY PRN
Qty: 30 TABLET | Refills: 0 | Status: SHIPPED | OUTPATIENT
Start: 2025-07-29

## 2025-07-29 NOTE — PROGRESS NOTES
"PSYCHIATRIC FOLLOW-UP VISIT NOTE    Chief Complaint   Patient presents with    Medication Management     Medication management         History of Present Illness  65 y.o. year old White female with hx of MDD, tod, and insomnia seen today for follow-up appointment and medication management.  Patient reports that she will be having shoulder surgery on August 18th.  She is very grateful this, as she thought the surgery would not be happening at our last visit.  Her granddaughter has also moved to the [facility] on July 12th.  Patient has been coping with this better than expected, and they have been able to face time each other multiple times since the move.  She also recently concluded a malpractice suit surrounding her 's death.  She is glad to have some closure surrounding this issue as well.  Describes her depression as minimal and her anxiety as mild.  She has been sleeping well with lower dose of trazodone.  She seems to be in much better spirits than at her last visit.  Does not desire any medication changes today and denies any side effects from current regimen. Patient denies SI/HI. Denies hallucinations and does not appear to be responding to internal stimuli or be internally preoccupied. No manic symptoms noted.       Objective:     Vitals:  Vitals:    07/29/25 1307   BP: 118/68   BP Location: Right arm   Patient Position: Sitting   Pulse: 86   Temp: 97.2 °F (36.2 °C)   TempSrc: Temporal   SpO2: (!) 94%   Weight: (!) 147.5 kg (325 lb 2.9 oz)   Height: 5' 4.02" (1.626 m)       Wt Readings from Last 3 Encounters:   07/29/25 1307 (!) 147.5 kg (325 lb 2.9 oz)   07/01/25 1300 (!) 147.6 kg (325 lb 6.4 oz)   03/03/25 1251 (!) 148.9 kg (328 lb 4.2 oz)         Medication:  Current Medications[1]       Significant Labs: - none at this time    Significant Imaging: - none at this time    Physical Exam     See HPI    Review of Systems     Mental Status Exam:  Presentation:  - Appearance: 65 y.o. year old White " "female, appears stated age, appears Casually dressed and Well groomed  - Motility: Erect when standing, Steady gait, No EPS or Tremors, No psychomotor agitation or retardation appreciated  - Behavior: calm, cooperative, good eye contact  Speech:  - Character/Organization: spontaneous, fluent, normal volume, normal rate, normal rhythm  Emotional State:  - Mood: "calmer"   - Affect: congruent and appropriate  Thought:  - Process: logical, linear, organized , goal-directed  - Preoccupations: no ruminations, rituals, or phobias appreciated  - Delusions: no persecutory, paranoid, or grandiose delusions appreciated  - Perception: denies AVH, not actively responding to internal stimuli  - SI/HI: denies/denies  Sensorium & Intellect:  - Sensorium: AAOx4  - Memory: intact to recent and remote events  - Attention/Concentration: good/good  - Insight/Judgement: good/good    Gait: normal swing and stance  MSK:no rigidity appreciated    All other systems without acute issues unless noted in HPI      Assessment/Plan      ICD-10-CM ICD-9-CM    1. Recurrent major depressive disorder, in full remission  F33.42 296.36 traZODone (DESYREL) 50 MG tablet      EScitalopram oxalate (LEXAPRO) 20 MG tablet      2. Generalized anxiety disorder  F41.1 300.02 EScitalopram oxalate (LEXAPRO) 20 MG tablet      3. Insomnia, unspecified type  G47.00 780.52 traZODone (DESYREL) 50 MG tablet         Continue current medications without change    Potential side effects and risks vs benefits of current treatment plan reviewed with patient. Applicable black box warnings reviewed. Encouraged patient not to alter dosages or abruptly discontinue medications without contacting prescriber first, due to risk of worsening symptoms and decompensation of mental status. Warned of risks associated with herbal remedies and supplements while taking psychotropic medications and of the need to consult prescriber prior to adding any of these to current regimen. Patient " "should abstain from abuse of alcohol, prescription medications, and illicit drugs. Reviewed when to contact clinic and/or seek emergent care, such as but not limited to, onset/worsening SI/HI, hallucinations, delusions, manic symptoms. Pt verbalized understanding and agreement of these warnings/recommendations and verbally consented to treatment plan.    Portions of this note may have been created with voice recognition software. Occasional "wrong-word" or "sound-a-like" substitutions may have occurred due to the inherent limitations of voice recognition software. Please, read the note carefully and recognize, using context, where substitutions have occurred.      Follow up in about 2 months (around 9/29/2025) for Medication Management.        Vic Song, [facility]         [1]   Current Outpatient Medications:     blood pressure test kit-large Kit, 1 each by Misc.(Non-Drug; Combo Route) route 2 (two) times daily as needed (htn)., Disp: 1 each, Rfl: 0    calcium carbonate/vitamin D3 (CALCIUM WITH VITAMIN D3 ORAL), Take 1 each by mouth Daily., Disp: , Rfl:     carvediloL (COREG) 6.25 MG tablet, Take 6.25 mg by mouth 2 (two) times daily., Disp: , Rfl:     docusate sodium (COLACE) 100 MG capsule, Take 100 mg by mouth Daily., Disp: , Rfl:     ELIQUIS 5 mg Tab, Take 5 mg by mouth 2 (two) times daily., Disp: , Rfl:     enalapril (VASOTEC) 20 MG tablet, Take 1 tablet by mouth once daily., Disp: , Rfl:     furosemide (LASIX) 40 MG tablet, Take 40 mg by mouth daily as needed., Disp: , Rfl:     naproxen (NAPROSYN) 500 MG tablet, Take 1 tablet (500 mg total) by mouth daily as needed (pain). Take 1 tablet by mouth daily with meals as needed for pain., Disp: 60 tablet, Rfl: 0    nitroGLYCERIN (NITROSTAT) 0.4 MG SL tablet, Place 0.4 mg under the tongue as needed., Disp: , Rfl:     ranolazine (RANEXA) 500 MG Tb12, Take 500 mg by mouth 2 (two) times daily., Disp: , Rfl:     rosuvastatin (CRESTOR) 10 MG tablet, Take 1 tablet " (10 mg total) by mouth once daily., Disp: 30 tablet, Rfl: 11    EScitalopram oxalate (LEXAPRO) 20 MG tablet, Take 1 tablet (20 mg total) by mouth once daily., Disp: 30 tablet, Rfl: 0    oxybutynin (DITROPAN-XL) 5 MG TR24, Take 1 tablet (5 mg total) by mouth once daily. (Patient not taking: Reported on 7/29/2025), Disp: 30 tablet, Rfl: 11    traZODone (DESYREL) 50 MG tablet, Take 1 tablet (50 mg total) by mouth nightly as needed for Insomnia., Disp: 30 tablet, Rfl: 0

## 2025-09-03 DIAGNOSIS — G47.00 INSOMNIA, UNSPECIFIED TYPE: ICD-10-CM

## 2025-09-03 DIAGNOSIS — F33.42 RECURRENT MAJOR DEPRESSIVE DISORDER, IN FULL REMISSION: ICD-10-CM

## 2025-09-03 RX ORDER — TRAZODONE HYDROCHLORIDE 50 MG/1
TABLET ORAL
Qty: 30 TABLET | Refills: 0 | Status: SHIPPED | OUTPATIENT
Start: 2025-09-03

## (undated) DEVICE — KIT MANIFOLD LOW PRESS TUBING

## (undated) DEVICE — PAD DEFIB RADIOTRANSPARENT

## (undated) DEVICE — TUBE CONNECTING 10IN

## (undated) DEVICE — GUIDEWIRE INQWIRE SE 3MM JTIP

## (undated) DEVICE — GUIDEWRE SYS VERSACORE FLPY 26

## (undated) DEVICE — SHEATH GLIDE SLENDER 6FR

## (undated) DEVICE — Device

## (undated) DEVICE — HEMOSTAT VASC BAND REG 24CM

## (undated) DEVICE — CATH OPTITORQUE TIGER 5F 100CM

## (undated) DEVICE — ANGIOTOUCH KIT

## (undated) DEVICE — CATH PIGTAIL MOD 5.2FR 125CM

## (undated) DEVICE — ARMBOARD ADULT ARTERIAL